# Patient Record
Sex: FEMALE | Race: WHITE | NOT HISPANIC OR LATINO | Employment: OTHER | URBAN - NONMETROPOLITAN AREA
[De-identification: names, ages, dates, MRNs, and addresses within clinical notes are randomized per-mention and may not be internally consistent; named-entity substitution may affect disease eponyms.]

---

## 2017-01-03 ENCOUNTER — TELEPHONE (OUTPATIENT)
Dept: SLEEP MEDICINE | Facility: HOSPITAL | Age: 67
End: 2017-01-03

## 2017-01-03 DIAGNOSIS — J45.20 MILD INTERMITTENT ASTHMA WITHOUT COMPLICATION: Primary | ICD-10-CM

## 2017-01-03 RX ORDER — PREDNISONE 20 MG/1
TABLET ORAL
Qty: 21 TABLET | Refills: 1 | Status: SHIPPED | OUTPATIENT
Start: 2017-01-03 | End: 2017-10-31

## 2017-01-12 ENCOUNTER — OFFICE VISIT (OUTPATIENT)
Dept: FAMILY MEDICINE | Facility: OTHER | Age: 67
End: 2017-01-12
Attending: PHYSICIAN ASSISTANT
Payer: COMMERCIAL

## 2017-01-12 VITALS
HEART RATE: 88 BPM | OXYGEN SATURATION: 94 % | HEIGHT: 63 IN | TEMPERATURE: 97.3 F | DIASTOLIC BLOOD PRESSURE: 78 MMHG | SYSTOLIC BLOOD PRESSURE: 123 MMHG

## 2017-01-12 DIAGNOSIS — J01.01 ACUTE RECURRENT MAXILLARY SINUSITIS: Primary | ICD-10-CM

## 2017-01-12 DIAGNOSIS — M79.7 FIBROMYALGIA: ICD-10-CM

## 2017-01-12 DIAGNOSIS — G25.81 RESTLESS LEGS SYNDROME: ICD-10-CM

## 2017-01-12 DIAGNOSIS — F33.1 MODERATE EPISODE OF RECURRENT MAJOR DEPRESSIVE DISORDER (H): ICD-10-CM

## 2017-01-12 PROCEDURE — 99212 OFFICE O/P EST SF 10 MIN: CPT

## 2017-01-12 PROCEDURE — 99214 OFFICE O/P EST MOD 30 MIN: CPT | Performed by: PHYSICIAN ASSISTANT

## 2017-01-12 RX ORDER — BUPROPION HYDROCHLORIDE 300 MG/1
300 TABLET ORAL EVERY MORNING
Qty: 30 TABLET | Refills: 3 | Status: SHIPPED | OUTPATIENT
Start: 2017-01-12 | End: 2017-04-26

## 2017-01-12 RX ORDER — PRAMIPEXOLE DIHYDROCHLORIDE 0.5 MG/1
TABLET ORAL
Qty: 30 TABLET | Refills: 12 | Status: SHIPPED | OUTPATIENT
Start: 2017-01-12 | End: 2018-01-21

## 2017-01-12 RX ORDER — CHLORAL HYDRATE 500 MG
1 CAPSULE ORAL 2 TIMES DAILY
COMMUNITY
End: 2017-12-07

## 2017-01-12 RX ORDER — TRAMADOL HYDROCHLORIDE 50 MG/1
TABLET ORAL
Qty: 60 TABLET | Refills: 0 | Status: SHIPPED | OUTPATIENT
Start: 2017-01-12 | End: 2018-05-23

## 2017-01-12 ASSESSMENT — ANXIETY QUESTIONNAIRES
5. BEING SO RESTLESS THAT IT IS HARD TO SIT STILL: SEVERAL DAYS
6. BECOMING EASILY ANNOYED OR IRRITABLE: SEVERAL DAYS
3. WORRYING TOO MUCH ABOUT DIFFERENT THINGS: MORE THAN HALF THE DAYS
2. NOT BEING ABLE TO STOP OR CONTROL WORRYING: MORE THAN HALF THE DAYS
7. FEELING AFRAID AS IF SOMETHING AWFUL MIGHT HAPPEN: SEVERAL DAYS
GAD7 TOTAL SCORE: 10
1. FEELING NERVOUS, ANXIOUS, OR ON EDGE: SEVERAL DAYS
IF YOU CHECKED OFF ANY PROBLEMS ON THIS QUESTIONNAIRE, HOW DIFFICULT HAVE THESE PROBLEMS MADE IT FOR YOU TO DO YOUR WORK, TAKE CARE OF THINGS AT HOME, OR GET ALONG WITH OTHER PEOPLE: SOMEWHAT DIFFICULT

## 2017-01-12 ASSESSMENT — PATIENT HEALTH QUESTIONNAIRE - PHQ9: 5. POOR APPETITE OR OVEREATING: MORE THAN HALF THE DAYS

## 2017-01-12 NOTE — NURSING NOTE
"Chief Complaint   Patient presents with     URI     *_* Health Care Directive *_*     pt will bring in copy        Initial /78 mmHg  Pulse 88  Temp(Src) 97.3  F (36.3  C) (Tympanic)  Ht 5' 3\" (1.6 m)  Wt   SpO2 94%  Breastfeeding? No Estimated body mass index is 28.35 kg/(m^2) as calculated from the following:    Height as of this encounter: 5' 3\" (1.6 m).    Weight as of 12/6/16: 160 lb (72.576 kg).  BP completed using cuff size: lakshmi [address] LPN      "

## 2017-01-12 NOTE — PROGRESS NOTES
SUBJECTIVE:                                                    Anya Vicente is a 66 year old female who presents to clinic today for the following health issues:      RESPIRATORY SYMPTOMS      Duration: had before [place]    Description  nasal congestion, rhinorrhea, facial pain/pressure, cough, wheezing, headache, fatigue/malaise and nausea    Severity: moderate    Accompanying signs and symptoms: coughing up green mucus     History (predisposing factors):  asthma    Precipitating or alleviating factors: None    Therapies tried and outcome:  nasal spray/wash - helped       Asthma Follow-Up    Was ACT completed today?    Yes    ACT Total Scores 8/27/2014   ACT TOTAL SCORE 13   ASTHMA ER VISITS 0 = None   ASTHMA HOSPITALIZATIONS 0 = None     Depression Followup    Status since last visit: Worsened in winter feels needs a higher dose.     See PHQ-9 for current symptoms.  Other associated symptoms: None    Complicating factors:   Significant life event:  No   Current substance abuse:  None  Anxiety or Panic symptoms:  No    PHQ-9  English PHQ-9   Any Language            Amount of exercise or physical activity: None    Problems taking medications regularly: No    Medication side effects: none  Diet: regular (no restrictions)      Medication Followup of tramadol for Fibromyalgia    Taking Medication as prescribed: yes    Side Effects:  None    Medication Helping Symptoms:  NO          Problem list and histories reviewed & adjusted, as indicated.  Additional history: as documented    Patient Active Problem List   Diagnosis     Narcolepsy without cataplexy     Advanced care planning/counseling discussion     Glucose intolerance (impaired glucose tolerance)     Insomnia     Major depressive disorder     Past Surgical History   Procedure Laterality Date     Hysterectomy  1995 01/01/2011     D&c  1991 01/01/2011     Hemorrhoidectomy  1979     01/01/2011     Echocardiogram  2006 01/01/2011      Bilateral cataract extraction  2006     01/01/2011     Leg repair after fx  >lt  2009     Bunion surgery > rt  2010     Trigger  thumb release  2011     Release carpal tunnel  2010     RT     Deviated septum repair  2012     Colonoscopy  12/23/2013     Procedure: COLONOSCOPY;  COLONOSCOPY;  Surgeon: Kasia Enrique DO;  Location: [facility]     Laparoscopic herniorrhaphy ventral  1/16/2014     Procedure: LAPAROSCOPIC HERNIORRHAPHY VENTRAL;  LAPAROSCOPIC VENTRAL HERNIA REPAIR W/ MESH;  Surgeon: Kasia Enrique DO;  Location: [facility] OR       Social History   Substance Use Topics     Smoking status: Never Smoker      Smokeless tobacco: Never Used     Alcohol Use: Yes      Comment: RARELY     Family History   Problem Relation Age of Onset     Alcohol/Drug Father 42     Motor Vehicle Accident due to Alcoholism - Cause of Death     DIABETES Maternal Uncle      Parkinsonism Maternal Uncle      Depression Mother      Hypertension Mother      CANCER Mother      Cervical     Parkinsonism Mother      Other - See Comments Sister      ADD/ADHD     Other - See Comments Sister      Fibromyalgia     DIABETES Sister      DIABETES Sister      Other - See Comments Sister      ADD/ADHD     Other - See Comments Son      ADD/ADHD     Other - See Comments Son      ADD/ADHD         Current Outpatient Prescriptions   Medication Sig Dispense Refill     fish oil-omega-3 fatty acids 1000 MG capsule Take 1 g by mouth 2 times daily       predniSONE (DELTASONE) 20 MG tablet 2 po qam x 7 d, 1 po q am x 7 d, then stop 21 tablet 1     amphetamine-dextroamphetamine (ADDERALL) 20 MG per tablet 2 tabs q AM, 1 tab q afternoon 90 tablet 0     buPROPion (WELLBUTRIN XL) 150 MG 24 hr tablet TAKE ONE TABLET BY MOUTH ONCE DAILY IN THE MORNING 90 tablet 1     albuterol (VENTOLIN HFA) 108 (90 BASE) MCG/ACT inhaler INHALE 2 PUFFS INTO THE LUNGS EVERY 4 HOURS AS NEEDED FOR SHORTNESS OFBREATH / DYSPNEA. 18 g 12     hydrochlorothiazide (HYDRODIURIL) 50 MG tablet TAKE  "ONE TABLET DAILY BY MOUTH 90 tablet 0     zolpidem (AMBIEN) 10 MG tablet TAKE 1/2 to 1 TABLET DAILY BY MOUT H AT BEDTIME - GENERIC FOR AMBIEN 30 tablet 1     pramipexole (MIRAPEX) 0.5 MG tablet TAKE 1 TABLET BY MOUTH AT B EDTIME - GENERIC FOR MIRAPE X 30 tablet 12     clobetasol (TEMOVATE) 0.05 % cream APPLY DAILY AS NEEDED -NO NOT USE LONGER THAN 2 WEEKS IN A ROW-GENERI C FOR TEMOVATE 15 g 0     traMADol (ULTRAM) 50 MG tablet TAKE 1 TO 2 TABLETS BY MOUTH EVERY 6 HOURS AS NEEDED FOR MODERATE P AIN 60 tablet 0     ORDER FOR DME Equipment being ordered: silver sneakers. 1 Act 0     ORDER FOR DME Equipment being ordered: thoracic/lumbrosacral support. 1 Device 1     diazepam (VALIUM) 5 MG tablet Take 1 tablet (5 mg) by mouth every 12 hours as needed for muscle spasms 30 tablet 0     ORDER FOR DME Equipment being ordered: knee sleeve 1 Device 6     Calcium Carbonate-Vit D-Min (CALCIUM 1200 PO) Take 1,200 mg by mouth daily        FREESTYLE LITE strip TEST TWICE A  each 0     fluticasone (FLONASE) 50 MCG/ACT nasal spray 2 SPRAYS IN EACH NOSTRIL DAILY - GENERIC FOR FLONASE 32 g 2     Allergies   Allergen Reactions     Acetaminophen      Darvocet-N 100       Codeine      Hydrocodone      \"Patient states can take liquid med but not pill\".     Propoxyphene Napsylate      Darvocet-N 100     BP Readings from Last 3 Encounters:   01/12/17 123/78   12/06/16 124/74   02/04/16 114/68    Wt Readings from Last 3 Encounters:   12/06/16 160 lb (72.576 kg)   02/04/16 156 lb (70.761 kg)   04/29/15 152 lb (68.947 kg)                  Problem list, Medication list, Allergies, and Medical/Social/Surgical histories reviewed in EPIC and updated as appropriate.    ROS:  Constitutional, neuro, ENT, endocrine, pulmonary, cardiac, gastrointestinal, genitourinary, musculoskeletal, integument and psychiatric systems are negative, except as otherwise noted.    OBJECTIVE:                                                    /78 mmHg  " "Pulse 88  Temp(Src) 97.3  F (36.3  C) (Tympanic)  Ht 5' 3\" (1.6 m)  Wt   SpO2 94%  Breastfeeding? No  There is no weight on file to calculate BMI.  GENERAL APPEARANCE: healthy, alert and no distress  EYES: Eyes grossly normal to inspection, PERRL and conjunctivae and sclerae normal  HENT: ear canals and TM's normal and nose and mouth without ulcers or lesions  NECK: no adenopathy, no asymmetry, masses, or scars and thyroid normal to palpation  RESP: lungs clear to auscultation - no rales, rhonchi or wheezes. choking like coughing.   CV: regular rates and rhythm, normal S1 S2, no S3 or S4 and no murmur, click or rub  LYMPHATICS: normal ant/post cervical and supraclavicular nodes  ABDOMEN: soft, nontender, without hepatosplenomegaly or masses and bowel sounds normal  MS: extremities normal- no gross deformities noted  SKIN: no suspicious lesions or rashes  NEURO: Normal strength and tone, mentation intact and speech normal  PSYCH: mentation appears normal and affect intense, .       Diagnostic test results:  Diagnostic Test Results:  No results found for this or any previous visit (from the past 24 hour(s)).     ASSESSMENT/PLAN:                                                    1. Acute recurrent maxillary sinusitis  Recurrent issues with this.  Post drip and on prednisone. Allergies have been bad and saw Dr. Gomez.   - amoxicillin-clavulanate (AUGMENTIN) 875-125 MG per tablet; Take 1 tablet by mouth 2 times daily  Dispense: 20 tablet; Refill: 0    2. Restless legs syndrome  She is given a refill.   - pramipexole (MIRAPEX) 0.5 MG tablet; TAKE 1 TABLET BY MOUTH AT B EDTIME - GENERIC FOR MIRAPE X  Dispense: 30 tablet; Refill: 12    3. Fibromyalgia  Long standing.  No energy and depression. Using tramadol sparingly.     - traMADol (ULTRAM) 50 MG tablet; TAKE 1 TO 2 TABLETS BY MOUTH EVERY 6 HOURS AS NEEDED FOR MODERATE P AIN  Dispense: 60 tablet; Refill: 0    4. Moderate episode of recurrent major depressive " disorder (H)  Increase her dose.   Let us know how things are when she sees us back for her physical.     - buPROPion (WELLBUTRIN XL) 300 MG 24 hr tablet; Take 1 tablet (300 mg) by mouth every morning  Dispense: 30 tablet; Refill: 3        See Patient Instructions    Cindy Danielle PA-C  [facility]

## 2017-01-12 NOTE — MR AVS SNAPSHOT
After Visit Summary   1/12/2017    Anya Vicente    MRN: [id number]           Patient Information     Date Of Birth          1950        Visit Information        Provider Department      1/12/2017 11:15 AM Cindy Danielle, PA [facility]        Today's Diagnoses     Acute recurrent maxillary sinusitis    -  1     Restless legs syndrome         Fibromyalgia         Moderate episode of recurrent major depressive disorder (H)           Care Instructions      My Asthma Action Plan  Name: Anya Vicente   YOB: 1950  Date: 1/12/2017   My doctor: Cindy Danielle   My clinic: [facility] [place]        My Rescue Medicine: Albuterol (Proair/Ventolin/Proventil) HFA          My Oral Steroid Medicine: prednisone My Asthma Severity: intermittent   Avoid your asthma triggers: Patient is unaware of triggers. Has many allergies and with her viral infections.           GREEN ZONE   Good Control    I feel good    No cough or wheeze    Can work, sleep and play without asthma symptoms       Take your asthma control medicine every day.     1. If exercise triggers your asthma, take your rescue medication    15 minutes before exercise or sports, and    During exercise if you have asthma symptoms  2. Spacer to use with inhaler: If you have a spacer, make sure to use it with your inhaler             YELLOW ZONE Getting Worse  I have ANY of these:    I do not feel good    Cough or wheeze    Chest feels tight    Wake up at night   1. Keep taking your Green Zone medications  2. Start taking your rescue medicine:    every 20 minutes for up to 1 hour. Then every 4 hours for 24-48 hours.  3. If you stay in the Yellow Zone for more than 12-24 hours, contact your doctor.  4. If you do not return to the Green Zone in 12-24 hours or you get worse, start taking your oral steroid medicine if prescribed by your provider.           RED ZONE Medical Alert - Get Help  I have ANY of  these:    I feel awful    Medicine is not helping    Breathing getting harder    Trouble walking or talking    Nose opens wide to breathe       1. Take your rescue medicine NOW  2. If your provider has prescribed an oral steroid medicine, start taking it NOW  3. Call your doctor NOW  4. If you are still in the Red Zone after 20 minutes and you have not reached your doctor:    Take your rescue medicine again and    Call 911 or go to the emergency room right away    See your regular doctor within 2 weeks of an Emergency Room or Urgent Care visit for follow-up treatment.        The above medication may be given at school or day care?: N/A (Adult Patient)  Child can carry and use inhaler(s) at school with approval of school nurse?: N/A (Adult Patient)    Electronically signed by: Shantelle Way, January 12, 2017    Annual Reminders:  Meet with Asthma Educator,  Flu Shot in the Fall, consider Pneumonia Vaccination for patients with asthma (aged 19 and older).    Pharmacy:    [id number] - [place], [place] - [address] [facility] - [place], [place] - [address] [facility] [address]                    Asthma Triggers  How To Control Things That Make Your Asthma Worse    Triggers are things that make your asthma worse.  Look at the list below to help you find your triggers and what you can do about them.  You can help prevent asthma flare-ups by staying away from your triggers.      Trigger                                                          What you can do   Cigarette Smoke  Tobacco smoke can make asthma worse. Do not allow smoking in your home, car or around you.  Be sure no one smokes at a child s day care or school.  If you smoke, ask your health care provider for ways to help you quit.  Ask family members to quit too.  Ask your health care provider for a referral to Quit Plan to help you quit smoking, or call 8-608-551-PLAN.     Colds, Flu, Bronchitis  These are common triggers of asthma. Wash your hands  often.  Don t touch your eyes, nose or mouth.  Get a flu shot every year.     Dust Mites  These are tiny bugs that live in cloth or carpet. They are too small to see. Wash sheets and blankets in hot water every week.   Encase pillows and mattress in dust mite proof covers.  Avoid having carpet if you can. If you have carpet, vacuum weekly.   Use a dust mask and HEPA vacuum.   Pollen and Outdoor Mold  Some people are allergic to trees, grass, or weed pollen, or molds. Try to keep your windows closed.  Limit time out doors when pollen count is high.   Ask you health care provider about taking medicine during allergy season.     Animal Dander  Some people are allergic to skin flakes, urine or saliva from pets with fur or feathers. Keep pets with fur or feathers out of your home.    If you can t keep the pet outdoors, then keep the pet out of your bedroom.  Keep the bedroom door closed.  Keep pets off cloth furniture and away from stuffed toys.     Mice, Rats, and Cockroaches  Some people are allergic to the waste from these pests.   Cover food and garbage.  Clean up spills and food crumbs.  Store grease in the refrigerator.   Keep food out of the bedroom.   Indoor Mold  This can be a trigger if your home has high moisture. Fix leaking faucets, pipes, or other sources of water.   Clean moldy surfaces.  Dehumidify basement if it is damp and smelly.   Smoke, Strong Odors, and Sprays  These can reduce air quality. Stay away from strong odors and sprays, such as perfume, powder, hair spray, paints, smoke incense, paint, cleaning products, candles and new carpet.   Exercise or Sports  Some people with asthma have this trigger. Be active!  Ask your doctor about taking medicine before sports or exercise to prevent symptoms.    Warm up for 5-10 minutes before and after sports or exercise.     Other Triggers of Asthma  Cold air:  Cover your nose and mouth with a scarf.  Sometimes laughing or crying can be a trigger.  Some  "medicines and food can trigger asthma.           Follow-ups after your visit        Who to contact     If you have questions or need follow up information about today's clinic visit or your schedule please contact [facility] [place] directly at [phone].  Normal or non-critical lab and imaging results will be communicated to you by MyChart, letter or phone within 4 business days after the clinic has received the results. If you do not hear from us within 7 days, please contact the clinic through [employer] or phone. If you have a critical or abnormal lab result, we will notify you by phone as soon as possible.  Submit refill requests through [employer] or call your pharmacy and they will forward the refill request to us. Please allow 3 business days for your refill to be completed.          Additional Information About Your Visit        [employer] Information     [employer] gives you secure access to your electronic health record. If you see a primary care provider, you can also send messages to your care team and make appointments. If you have questions, please call your primary care clinic.  If you do not have a primary care provider, please call [phone] and they will assist you.        Care EveryWhere ID     This is your Care EveryWhere ID. This could be used by other organizations to access your [place] medical records  [id number]        Your Vitals Were     Pulse Temperature Height Pulse Oximetry Breastfeeding?       88 97.3  F (36.3  C) (Tympanic) 5' 3\" (1.6 m) 94% No        Blood Pressure from Last 3 Encounters:   01/12/17 123/78   12/06/16 124/74   02/04/16 114/68    Weight from Last 3 Encounters:   12/06/16 160 lb (72.576 kg)   02/04/16 156 lb (70.761 kg)   04/29/15 152 lb (68.947 kg)              We Performed the Following     Asthma Action Plan (AAP)          Today's Medication Changes          These changes are accurate as of: 1/12/17 11:46 AM.  If you have any questions, ask your nurse or doctor. "               Start taking these medicines.        Dose/Directions    amoxicillin-clavulanate 875-125 MG per tablet   Commonly known as:  AUGMENTIN   Used for:  Acute recurrent maxillary sinusitis   Started by:  Cindy Danielle PA        Dose:  1 tablet   Take 1 tablet by mouth 2 times daily   Quantity:  20 tablet   Refills:  0         These medicines have changed or have updated prescriptions.        Dose/Directions    buPROPion 300 MG 24 hr tablet   Commonly known as:  WELLBUTRIN XL   This may have changed:  See the new instructions.   Used for:  Moderate episode of recurrent major depressive disorder (H)   Changed by:  Cindy Danielle PA        Dose:  300 mg   Take 1 tablet (300 mg) by mouth every morning   Quantity:  30 tablet   Refills:  3       traMADol 50 MG tablet   Commonly known as:  ULTRAM   This may have changed:  See the new instructions.   Used for:  Fibromyalgia   Changed by:  Cindy Danielle PA        TAKE 1 TO 2 TABLETS BY MOUTH EVERY 6 HOURS AS NEEDED FOR MODERATE P AIN   Quantity:  60 tablet   Refills:  0            Where to get your medicines      These medications were sent to [id number] - [place] - [address] [place] [address]     Phone:  [phone]    - amoxicillin-clavulanate 875-125 MG per tablet  - buPROPion 300 MG 24 hr tablet  - pramipexole 0.5 MG tablet      Some of these will need a paper prescription and others can be bought over the counter.  Ask your nurse if you have questions.     Bring a paper prescription for each of these medications    - traMADol 50 MG tablet             Primary Care Provider Office Phone # Fax #    RAHDAMES Avendaño [phone]       [address] [address]  [place] [address]        Thank you!     Thank you for choosing [facility]  for your care. Our goal is always to provide you with excellent care. Hearing back from our patients is one way we can  continue to improve our services. Please take a few minutes to complete the written survey that you may receive in the mail after your visit with us. Thank you!             Your Updated Medication List - Protect others around you: Learn how to safely use, store and throw away your medicines at www.disposemymeds.org.          This list is accurate as of: 1/12/17 11:46 AM.  Always use your most recent med list.                   Brand Name Dispense Instructions for use    albuterol 108 (90 BASE) MCG/ACT Inhaler    VENTOLIN HFA    18 g    INHALE 2 PUFFS INTO THE LUNGS EVERY 4 HOURS AS NEEDED FOR SHORTNESS OFBREATH / DYSPNEA.       amoxicillin-clavulanate 875-125 MG per tablet    AUGMENTIN    20 tablet    Take 1 tablet by mouth 2 times daily       amphetamine-dextroamphetamine 20 MG per tablet    ADDERALL    90 tablet    2 tabs q AM, 1 tab q afternoon       buPROPion 300 MG 24 hr tablet    WELLBUTRIN XL    30 tablet    Take 1 tablet (300 mg) by mouth every morning       CALCIUM 1200 PO      Take 1,200 mg by mouth daily       clobetasol 0.05 % cream    TEMOVATE    15 g    APPLY DAILY AS NEEDED -NO NOT USE LONGER THAN 2 WEEKS IN A ROW-GENERI C FOR TEMOVATE       diazepam 5 MG tablet    VALIUM    30 tablet    Take 1 tablet (5 mg) by mouth every 12 hours as needed for muscle spasms       fish oil-omega-3 fatty acids 1000 MG capsule      Take 1 g by mouth 2 times daily       fluticasone 50 MCG/ACT spray    FLONASE    32 g    2 SPRAYS IN EACH NOSTRIL DAILY - GENERIC FOR FLONASE       FREESTYLE LITE test strip   Generic drug:  blood glucose monitoring     100 each    TEST TWICE A DAY       hydrochlorothiazide 50 MG tablet    HYDRODIURIL    90 tablet    TAKE ONE TABLET DAILY BY MOUTH       order for DME     1 Device    Equipment being ordered: knee sleeve       * order for DME     1 Act    Equipment being ordered: silver sneakers.       * order for DME     1 Device    Equipment being ordered: thoracic/lumbrosacral support.        pramipexole 0.5 MG tablet    MIRAPEX    30 tablet    TAKE 1 TABLET BY MOUTH AT B EDTIME - GENERIC FOR MIRAPE X       predniSONE 20 MG tablet    DELTASONE    21 tablet    2 po qam x 7 d, 1 po q am x 7 d, then stop       traMADol 50 MG tablet    ULTRAM    60 tablet    TAKE 1 TO 2 TABLETS BY MOUTH EVERY 6 HOURS AS NEEDED FOR MODERATE P AIN       zolpidem 10 MG tablet    AMBIEN    30 tablet    TAKE 1/2 to 1 TABLET DAILY BY MOUT H AT BEDTIME - GENERIC FOR AMBIEN       * Notice:  This list has 2 medication(s) that are the same as other medications prescribed for you. Read the directions carefully, and ask your doctor or other care provider to review them with you.

## 2017-01-12 NOTE — PATIENT INSTRUCTIONS
My Asthma Action Plan  Name: Anya Vicente   YOB: 1950  Date: 1/12/2017   My doctor: Cindy Danielle   My clinic: [facility] [place]        My Rescue Medicine: Albuterol (Proair/Ventolin/Proventil) HFA          My Oral Steroid Medicine: prednisone My Asthma Severity: intermittent   Avoid your asthma triggers: Patient is unaware of triggers. Has many allergies and with her viral infections.           GREEN ZONE   Good Control    I feel good    No cough or wheeze    Can work, sleep and play without asthma symptoms       Take your asthma control medicine every day.     1. If exercise triggers your asthma, take your rescue medication    15 minutes before exercise or sports, and    During exercise if you have asthma symptoms  2. Spacer to use with inhaler: If you have a spacer, make sure to use it with your inhaler             YELLOW ZONE Getting Worse  I have ANY of these:    I do not feel good    Cough or wheeze    Chest feels tight    Wake up at night   1. Keep taking your Green Zone medications  2. Start taking your rescue medicine:    every 20 minutes for up to 1 hour. Then every 4 hours for 24-48 hours.  3. If you stay in the Yellow Zone for more than 12-24 hours, contact your doctor.  4. If you do not return to the Green Zone in 12-24 hours or you get worse, start taking your oral steroid medicine if prescribed by your provider.           RED ZONE Medical Alert - Get Help  I have ANY of these:    I feel awful    Medicine is not helping    Breathing getting harder    Trouble walking or talking    Nose opens wide to breathe       1. Take your rescue medicine NOW  2. If your provider has prescribed an oral steroid medicine, start taking it NOW  3. Call your doctor NOW  4. If you are still in the Red Zone after 20 minutes and you have not reached your doctor:    Take your rescue medicine again and    Call 911 or go to the emergency room right away    See your regular doctor within 2  weeks of an Emergency Room or Urgent Care visit for follow-up treatment.        The above medication may be given at school or day care?: N/A (Adult Patient)  Child can carry and use inhaler(s) at school with approval of school nurse?: N/A (Adult Patient)    Electronically signed by: Shantelle Way, January 12, 2017    Annual Reminders:  Meet with Asthma Educator,  Flu Shot in the Fall, consider Pneumonia Vaccination for patients with asthma (aged 19 and older).    Pharmacy:    [id number] - [place], [place] - [address] [facility] - [place], [place] - [address]                     Asthma Triggers  How To Control Things That Make Your Asthma Worse    Triggers are things that make your asthma worse.  Look at the list below to help you find your triggers and what you can do about them.  You can help prevent asthma flare-ups by staying away from your triggers.      Trigger                                                          What you can do   Cigarette Smoke  Tobacco smoke can make asthma worse. Do not allow smoking in your home, car or around you.  Be sure no one smokes at a child s day care or school.  If you smoke, ask your health care provider for ways to help you quit.  Ask family members to quit too.  Ask your health care provider for a referral to Quit Plan to help you quit smoking, or call 4-834-462-PLAN.     Colds, Flu, Bronchitis  These are common triggers of asthma. Wash your hands often.  Don t touch your eyes, nose or mouth.  Get a flu shot every year.     Dust Mites  These are tiny bugs that live in cloth or carpet. They are too small to see. Wash sheets and blankets in hot water every week.   Encase pillows and mattress in dust mite proof covers.  Avoid having carpet if you can. If you have carpet, vacuum weekly.   Use a dust mask and HEPA vacuum.   Pollen and Outdoor Mold  Some people are allergic to trees, grass, or weed pollen, or molds. Try to keep your windows  closed.  Limit time out doors when pollen count is high.   Ask you health care provider about taking medicine during allergy season.     Animal Dander  Some people are allergic to skin flakes, urine or saliva from pets with fur or feathers. Keep pets with fur or feathers out of your home.    If you can t keep the pet outdoors, then keep the pet out of your bedroom.  Keep the bedroom door closed.  Keep pets off cloth furniture and away from stuffed toys.     Mice, Rats, and Cockroaches  Some people are allergic to the waste from these pests.   Cover food and garbage.  Clean up spills and food crumbs.  Store grease in the refrigerator.   Keep food out of the bedroom.   Indoor Mold  This can be a trigger if your home has high moisture. Fix leaking faucets, pipes, or other sources of water.   Clean moldy surfaces.  Dehumidify basement if it is damp and smelly.   Smoke, Strong Odors, and Sprays  These can reduce air quality. Stay away from strong odors and sprays, such as perfume, powder, hair spray, paints, smoke incense, paint, cleaning products, candles and new carpet.   Exercise or Sports  Some people with asthma have this trigger. Be active!  Ask your doctor about taking medicine before sports or exercise to prevent symptoms.    Warm up for 5-10 minutes before and after sports or exercise.     Other Triggers of Asthma  Cold air:  Cover your nose and mouth with a scarf.  Sometimes laughing or crying can be a trigger.  Some medicines and food can trigger asthma.

## 2017-01-13 DIAGNOSIS — Z12.31 VISIT FOR SCREENING MAMMOGRAM: Primary | ICD-10-CM

## 2017-01-13 ASSESSMENT — PATIENT HEALTH QUESTIONNAIRE - PHQ9: SUM OF ALL RESPONSES TO PHQ QUESTIONS 1-9: 16

## 2017-01-13 ASSESSMENT — ANXIETY QUESTIONNAIRES: GAD7 TOTAL SCORE: 10

## 2017-01-13 ASSESSMENT — ASTHMA QUESTIONNAIRES: ACT_TOTALSCORE: 10

## 2017-01-16 PROCEDURE — G0202 SCR MAMMO BI INCL CAD: HCPCS | Mod: TC

## 2017-01-16 PROCEDURE — 77063 BREAST TOMOSYNTHESIS BI: CPT | Mod: TC

## 2017-02-17 DIAGNOSIS — F51.01 PRIMARY INSOMNIA: ICD-10-CM

## 2017-02-17 RX ORDER — ZOLPIDEM TARTRATE 10 MG/1
TABLET ORAL
Qty: 30 TABLET | Refills: 1 | Status: SHIPPED | OUTPATIENT
Start: 2017-02-17 | End: 2017-08-09

## 2017-02-17 NOTE — TELEPHONE ENCOUNTER
ambien      Last Written Prescription Date: 5/9/16  Last Fill Quantity: 30,  # refills: 1   Last Office Visit with G, P or [facility] prescribing provider: 1/12/17

## 2017-03-30 DIAGNOSIS — G47.419 NARCOLEPSY WITHOUT CATAPLEXY(347.00): ICD-10-CM

## 2017-03-30 RX ORDER — DEXTROAMPHETAMINE SACCHARATE, AMPHETAMINE ASPARTATE, DEXTROAMPHETAMINE SULFATE AND AMPHETAMINE SULFATE 5; 5; 5; 5 MG/1; MG/1; MG/1; MG/1
20 TABLET ORAL 3 TIMES DAILY
Qty: 90 TABLET | Refills: 0 | Status: SHIPPED | OUTPATIENT
Start: 2017-04-28 | End: 2017-06-06

## 2017-03-30 RX ORDER — DEXTROAMPHETAMINE SACCHARATE, AMPHETAMINE ASPARTATE, DEXTROAMPHETAMINE SULFATE AND AMPHETAMINE SULFATE 5; 5; 5; 5 MG/1; MG/1; MG/1; MG/1
TABLET ORAL
Qty: 90 TABLET | Refills: 0 | Status: SHIPPED | OUTPATIENT
Start: 2017-03-30 | End: 2017-06-06

## 2017-03-30 RX ORDER — DEXTROAMPHETAMINE SACCHARATE, AMPHETAMINE ASPARTATE, DEXTROAMPHETAMINE SULFATE AND AMPHETAMINE SULFATE 5; 5; 5; 5 MG/1; MG/1; MG/1; MG/1
20 TABLET ORAL 3 TIMES DAILY
Qty: 60 TABLET | Refills: 0 | Status: SHIPPED | OUTPATIENT
Start: 2017-05-30 | End: 2017-06-06

## 2017-04-19 ENCOUNTER — OFFICE VISIT (OUTPATIENT)
Dept: FAMILY MEDICINE | Facility: OTHER | Age: 67
End: 2017-04-19
Attending: NURSE PRACTITIONER
Payer: COMMERCIAL

## 2017-04-19 VITALS
OXYGEN SATURATION: 97 % | TEMPERATURE: 95.3 F | SYSTOLIC BLOOD PRESSURE: 122 MMHG | BODY MASS INDEX: 28.1 KG/M2 | RESPIRATION RATE: 22 BRPM | HEART RATE: 82 BPM | WEIGHT: 158.6 LBS | DIASTOLIC BLOOD PRESSURE: 64 MMHG | HEIGHT: 63 IN

## 2017-04-19 DIAGNOSIS — N89.8 VAGINAL ITCHING: ICD-10-CM

## 2017-04-19 DIAGNOSIS — K62.89 RECTAL IRRITATION: Primary | ICD-10-CM

## 2017-04-19 LAB
MICRO REPORT STATUS: NORMAL
SPECIMEN SOURCE: NORMAL
WET PREP SPEC: NORMAL

## 2017-04-19 PROCEDURE — 87210 SMEAR WET MOUNT SALINE/INK: CPT | Performed by: NURSE PRACTITIONER

## 2017-04-19 PROCEDURE — 99212 OFFICE O/P EST SF 10 MIN: CPT

## 2017-04-19 PROCEDURE — 99213 OFFICE O/P EST LOW 20 MIN: CPT | Performed by: NURSE PRACTITIONER

## 2017-04-19 ASSESSMENT — PAIN SCALES - GENERAL: PAINLEVEL: NO PAIN (0)

## 2017-04-19 NOTE — NURSING NOTE
"Chief Complaint   Patient presents with     Vaginal Problem       Initial /64 (BP Location: Right arm, Patient Position: Chair, Cuff Size: Adult Large)  Pulse 82  Temp 95.3  F (35.2  C) (Tympanic)  Resp 22  Ht 5' 3\" (1.6 m)  Wt 158 lb 9.6 oz (71.9 kg)  SpO2 97%  BMI 28.09 kg/m2 Estimated body mass index is 28.09 kg/(m^2) as calculated from the following:    Height as of this encounter: 5' 3\" (1.6 m).    Weight as of this encounter: 158 lb 9.6 oz (71.9 kg).  Medication Reconciliation: complete   Avelina Renteria      "

## 2017-04-19 NOTE — PATIENT INSTRUCTIONS
"  Ova and Parasites (Stool)  Does this test have other names?  Stool sample examination, stool O&P, fecal smear  What is this test?  This test looks for parasites and their larvae or eggs in a sample of your stool.  Parasites are organisms that can live within or on the human body and use it as a source of food. Many live in the digestive tract.  Many parasites also cause illnesses. These include one-celled organisms, such as Giardia, and larger organisms such as pinworms. In their adult form, pinworms can usually large enough to be seen.  Why do I need this test?  You may need this test if your healthcare provider suspects that you have parasites in your digestive tract. Giardia and cryptosporidium are common parasitic illnesses. Symptoms include:    Stomach upset or bloating    Diarrhea    Gas    Stomach cramping    Dehydration    Weight loss  Pinworms can live in the colon and rectum. Anal itching is a symptom of a pinworm infection. The itching is usually worse at night and may disturb your sleep.  What other tests might I have along with this test?  Your healthcare provider may also order blood tests and other tests for specific parasites. For example, if your doctor suspects a pinworm infection, you may have to do a \"tape test.\" In this test, the adhesive side of a piece of cellophane tape is gently pressed to the skin around the anus. Pinworm eggs will stick to the tape. Then the eggs can be moved to a slide and looked at under a microscope.  What do my test results mean?  Many things may affect your lab test results. These include the method each lab uses to do the test. Even if your test results are different from the normal value, you may not have a problem. To learn what the results mean for you, talk with your healthcare provider.  Normal results are negative, meaning that no parasites, larvae, or eggs were found in your sample.  Positive results mean that you have an infection with a parasite.  How is " this test done?  This test requires a stool sample. Your healthcare provider will tell you how to collect the sample. Don't collect fecal material from the toilet bowl or put toilet paper into the specimen container.  What might affect my test results?  Certain medicines can affect your results. This can be true for up to 1 week after you take the medicines. These medicines include:    Antacids    Bismuth    Some medicines used to treat diarrhea    Barium    Antibiotics    Oily laxatives  A sample contaminated by urine or toilet water may have an inaccurate result. Timing is also important. If the sample isn't brought to the lab promptly, the results may not be accurate.  How do I get ready for this test?  You may need to stop taking certain medicines before the test. Be sure your healthcare provider knows about all medicines, herbs, vitamins, and supplements you are taking. This includes medicines that don't need a prescription and any illicit drugs you may use.     9961-9025 The [employer]. [address] [place] [address], [place] [address]. All rights reserved. This information is not intended as a substitute for professional medical care. Always follow your healthcare professional's instructions.

## 2017-04-19 NOTE — MR AVS SNAPSHOT
"              After Visit Summary   4/19/2017    Anya Vicente    MRN: [id number]           Patient Information     Date Of Birth          1950        Visit Information        Provider Department      4/19/2017 12:40 PM Ying Tracy APRN [facility] [place]        Today's Diagnoses     Rectal irritation    -  1    Vaginal itching          Care Instructions      Ova and Parasites (Stool)  Does this test have other names?  Stool sample examination, stool O&P, fecal smear  What is this test?  This test looks for parasites and their larvae or eggs in a sample of your stool.  Parasites are organisms that can live within or on the human body and use it as a source of food. Many live in the digestive tract.  Many parasites also cause illnesses. These include one-celled organisms, such as Giardia, and larger organisms such as pinworms. In their adult form, pinworms can usually large enough to be seen.  Why do I need this test?  You may need this test if your healthcare provider suspects that you have parasites in your digestive tract. Giardia and cryptosporidium are common parasitic illnesses. Symptoms include:    Stomach upset or bloating    Diarrhea    Gas    Stomach cramping    Dehydration    Weight loss  Pinworms can live in the colon and rectum. Anal itching is a symptom of a pinworm infection. The itching is usually worse at night and may disturb your sleep.  What other tests might I have along with this test?  Your healthcare provider may also order blood tests and other tests for specific parasites. For example, if your doctor suspects a pinworm infection, you may have to do a \"tape test.\" In this test, the adhesive side of a piece of cellophane tape is gently pressed to the skin around the anus. Pinworm eggs will stick to the tape. Then the eggs can be moved to a slide and looked at under a microscope.  What do my test results mean?  Many things may affect your lab test results. " These include the method each lab uses to do the test. Even if your test results are different from the normal value, you may not have a problem. To learn what the results mean for you, talk with your healthcare provider.  Normal results are negative, meaning that no parasites, larvae, or eggs were found in your sample.  Positive results mean that you have an infection with a parasite.  How is this test done?  This test requires a stool sample. Your healthcare provider will tell you how to collect the sample. Don't collect fecal material from the toilet bowl or put toilet paper into the specimen container.  What might affect my test results?  Certain medicines can affect your results. This can be true for up to 1 week after you take the medicines. These medicines include:    Antacids    Bismuth    Some medicines used to treat diarrhea    Barium    Antibiotics    Oily laxatives  A sample contaminated by urine or toilet water may have an inaccurate result. Timing is also important. If the sample isn't brought to the lab promptly, the results may not be accurate.  How do I get ready for this test?  You may need to stop taking certain medicines before the test. Be sure your healthcare provider knows about all medicines, herbs, vitamins, and supplements you are taking. This includes medicines that don't need a prescription and any illicit drugs you may use.     3799-1124 The [employer]. [address] [place] [address]. All rights reserved. This information is not intended as a substitute for professional medical care. Always follow your healthcare professional's instructions.              Follow-ups after your visit        Follow-up notes from your care team     Return if symptoms worsen or fail to improve.      Future tests that were ordered for you today     Open Future Orders        Priority Expected Expires Ordered    Pinworm exam Routine  5/19/2017 4/19/2017    Ova and Parasite Exam Routine  "Routine  4/19/2018 4/19/2017            Who to contact     If you have questions or need follow up information about today's clinic visit or your schedule please contact [facility] [place] directly at [phone].  Normal or non-critical lab and imaging results will be communicated to you by MyChart, letter or phone within 4 business days after the clinic has received the results. If you do not hear from us within 7 days, please contact the clinic through MyChart or phone. If you have a critical or abnormal lab result, we will notify you by phone as soon as possible.  Submit refill requests through [employer] or call your pharmacy and they will forward the refill request to us. Please allow 3 business days for your refill to be completed.          Additional Information About Your Visit        [employer] Information     [employer] gives you secure access to your electronic health record. If you see a primary care provider, you can also send messages to your care team and make appointments. If you have questions, please call your primary care clinic.  If you do not have a primary care provider, please call [phone] and they will assist you.        Care EveryWhere ID     This is your Care EveryWhere ID. This could be used by other organizations to access your [facility] medical records  [id number]        Your Vitals Were     Pulse Temperature Respirations Height Pulse Oximetry BMI (Body Mass Index)    82 95.3  F (35.2  C) (Tympanic) 22 5' 3\" (1.6 m) 97% 28.09 kg/m2       Blood Pressure from Last 3 Encounters:   04/19/17 122/64   01/12/17 123/78   12/06/16 124/74    Weight from Last 3 Encounters:   04/19/17 158 lb 9.6 oz (71.9 kg)   12/06/16 160 lb (72.6 kg)   02/04/16 156 lb (70.8 kg)              We Performed the Following     Wet prep        Primary Care Provider Office Phone # Fax #    RADHAMES Avendaño [phone]       [facility] [address]  [place] [address]      "   Thank you!     [facility] you [facility]  [facility] your [facility]. Our goal is always to provide you with excellent care. Hearing back from our patients is one way we can continue to improve our services. Please take a few minutes to complete the written survey that you may receive in the mail after your visit with us. Thank you!             Your Updated Medication List - Protect others around you: Learn how to safely use, store and throw away your medicines at www.disposemymeds.org.          This list is accurate as of: 4/19/17  1:00 PM.  Always use your most recent med list.                   Brand Name Dispense Instructions for use    albuterol 108 (90 BASE) MCG/ACT Inhaler    VENTOLIN HFA    18 g    INHALE 2 PUFFS INTO THE LUNGS EVERY 4 HOURS AS NEEDED FOR SHORTNESS OFBREATH / DYSPNEA.       amoxicillin-clavulanate 875-125 MG per tablet    AUGMENTIN    20 tablet    Take 1 tablet by mouth 2 times daily       * amphetamine-dextroamphetamine 20 MG per tablet    ADDERALL    90 tablet    2 tabs q AM, 1 tab q afternoon       * amphetamine-dextroamphetamine 20 MG per tablet   Start taking on:  4/28/2017    ADDERALL    90 tablet    Take 1 tablet (20 mg) by mouth 3 times daily       * amphetamine-dextroamphetamine 20 MG per tablet   Start taking on:  5/30/2017    ADDERALL    60 tablet    Take 1 tablet (20 mg) by mouth 3 times daily       buPROPion 300 MG 24 hr tablet    WELLBUTRIN XL    30 tablet    Take 1 tablet (300 mg) by mouth every morning       CALCIUM 1200 PO      Take 1,200 mg by mouth daily       clobetasol 0.05 % cream    TEMOVATE    15 g    APPLY DAILY AS NEEDED -NO NOT USE LONGER THAN 2 WEEKS IN A ROW-GENERI C FOR TEMOVATE       diazepam 5 MG tablet    VALIUM    30 tablet    Take 1 tablet (5 mg) by mouth every 12 hours as needed for muscle spasms       fish oil-omega-3 fatty acids 1000 MG capsule      Take 1 g by mouth 2 times daily       fluticasone 50 MCG/ACT spray    FLONASE    32 g    2  SPRAYS IN EACH NOSTRIL DAILY - GENERIC FOR FLONASE       FREESTYLE LITE test strip   Generic drug:  blood glucose monitoring     100 each    TEST TWICE A DAY       hydrochlorothiazide 50 MG tablet    HYDRODIURIL    90 tablet    TAKE ONE TABLET DAILY BY MOUTH       order for DME     1 Device    Equipment being ordered: knee sleeve       * order for DME     1 Act    Equipment being ordered: silver sneakers.       * order for DME     1 Device    Equipment being ordered: thoracic/lumbrosacral support.       pramipexole 0.5 MG tablet    MIRAPEX    30 tablet    TAKE 1 TABLET BY MOUTH AT B EDTIME - GENERIC FOR MIRAPE X       predniSONE 20 MG tablet    DELTASONE    21 tablet    2 po qam x 7 d, 1 po q am x 7 d, then stop       traMADol 50 MG tablet    ULTRAM    60 tablet    TAKE 1 TO 2 TABLETS BY MOUTH EVERY 6 HOURS AS NEEDED FOR MODERATE P AIN       zolpidem 10 MG tablet    AMBIEN    30 tablet    TAKE 1/2 to 1 TABLET DAILY BY MOUT H AT BEDTIME - GENERIC FOR AMBIEN       * Notice:  This list has 5 medication(s) that are the same as other medications prescribed for you. Read the directions carefully, and ask your doctor or other care provider to review them with you.

## 2017-04-19 NOTE — PROGRESS NOTES
SUBJECTIVE:                                                    Anya Vicente is a 66 year old female who presents to clinic today for the following health issues:        Vaginal Symptoms      Duration: itching, thinks she has pin worms    Description  itching and burning    Intensity:  moderate    Accompanying signs and symptoms (fever/dysuria/abdominal or back pain): None    History  Sexually active: yes, single partner, contraception not required  Possibility of pregnancy: No  Recent antibiotic use: no     Precipitating or alleviating factors: None    Therapies tried and outcome: none and pinex   Outcome: didn't work     Anya states she has noted pin worms in her stool       Problem list and histories reviewed & adjusted, as indicated.  Additional history: as documented    Patient Active Problem List   Diagnosis     Narcolepsy without cataplexy     Advanced care planning/counseling discussion     Glucose intolerance (impaired glucose tolerance)     Insomnia     Major depressive disorder     Past Surgical History:   Procedure Laterality Date     BILATERAL CATARACT EXTRACTION  2006 01/01/2011     Bunion Surgery > RT  2010     COLONOSCOPY  12/23/2013    Procedure: COLONOSCOPY;  COLONOSCOPY;  Surgeon: Kasia Enrique DO;  Location: [facility]     D&C  1991 01/01/2011     deviated septum repair  2012     ECHOCARDIOGRAM  2006 01/01/2011     HEMORRHOIDECTOMY  1979    01/01/2011     HYSTERECTOMY  1995    01/01/2011     LAPAROSCOPIC HERNIORRHAPHY VENTRAL  1/16/2014    Procedure: LAPAROSCOPIC HERNIORRHAPHY VENTRAL;  LAPAROSCOPIC VENTRAL HERNIA REPAIR W/ MESH;  Surgeon: Kasia Enrique DO;  Location: [facility] OR     leg repair after fx  >LT  2009     RELEASE CARPAL TUNNEL  2010    RT     TRIGGER  THUMB RELEASE  2011       Social History   Substance Use Topics     Smoking status: Never Smoker     Smokeless tobacco: Never Used     Alcohol use Yes      Comment: RARELY     Family History   Problem Relation Age  of Onset     Alcohol/Drug Father 42     Motor Vehicle Accident due to Alcoholism - Cause of Death     DIABETES Maternal Uncle      Parkinsonism Maternal Uncle      Depression Mother      Hypertension Mother      CANCER Mother      Cervical     Parkinsonism Mother      Other - See Comments Sister      ADD/ADHD     Other - See Comments Sister      Fibromyalgia     DIABETES Sister      DIABETES Sister      Other - See Comments Sister      ADD/ADHD     Other - See Comments Son      ADD/ADHD     Other - See Comments Son      ADD/ADHD         Current Outpatient Prescriptions   Medication Sig Dispense Refill     amphetamine-dextroamphetamine (ADDERALL) 20 MG per tablet 2 tabs q AM, 1 tab q afternoon 90 tablet 0     [START ON 4/28/2017] amphetamine-dextroamphetamine (ADDERALL) 20 MG per tablet Take 1 tablet (20 mg) by mouth 3 times daily 90 tablet 0     [START ON 5/30/2017] amphetamine-dextroamphetamine (ADDERALL) 20 MG per tablet Take 1 tablet (20 mg) by mouth 3 times daily 60 tablet 0     zolpidem (AMBIEN) 10 MG tablet TAKE 1/2 to 1 TABLET DAILY BY MOUT H AT BEDTIME - GENERIC FOR AMBIEN 30 tablet 1     fish oil-omega-3 fatty acids 1000 MG capsule Take 1 g by mouth 2 times daily       pramipexole (MIRAPEX) 0.5 MG tablet TAKE 1 TABLET BY MOUTH AT B EDTIME - GENERIC FOR MIRAPE X 30 tablet 12     traMADol (ULTRAM) 50 MG tablet TAKE 1 TO 2 TABLETS BY MOUTH EVERY 6 HOURS AS NEEDED FOR MODERATE P AIN 60 tablet 0     buPROPion (WELLBUTRIN XL) 300 MG 24 hr tablet Take 1 tablet (300 mg) by mouth every morning 30 tablet 3     amoxicillin-clavulanate (AUGMENTIN) 875-125 MG per tablet Take 1 tablet by mouth 2 times daily 20 tablet 0     predniSONE (DELTASONE) 20 MG tablet 2 po qam x 7 d, 1 po q am x 7 d, then stop 21 tablet 1     albuterol (VENTOLIN HFA) 108 (90 BASE) MCG/ACT inhaler INHALE 2 PUFFS INTO THE LUNGS EVERY 4 HOURS AS NEEDED FOR SHORTNESS OFBREATH / DYSPNEA. 18 g 12     hydrochlorothiazide (HYDRODIURIL) 50 MG tablet TAKE  "ONE TABLET DAILY BY MOUTH 90 tablet 0     clobetasol (TEMOVATE) 0.05 % cream APPLY DAILY AS NEEDED -NO NOT USE LONGER THAN 2 WEEKS IN A ROW-GENERI C FOR TEMOVATE 15 g 0     ORDER FOR DME Equipment being ordered: silver sneakers. 1 Act 0     ORDER FOR DME Equipment being ordered: thoracic/lumbrosacral support. 1 Device 1     diazepam (VALIUM) 5 MG tablet Take 1 tablet (5 mg) by mouth every 12 hours as needed for muscle spasms 30 tablet 0     ORDER FOR DME Equipment being ordered: knee sleeve 1 Device 6     Calcium Carbonate-Vit D-Min (CALCIUM 1200 PO) Take 1,200 mg by mouth daily        FREESTYLE LITE strip TEST TWICE A  each 0     fluticasone (FLONASE) 50 MCG/ACT nasal spray 2 SPRAYS IN EACH NOSTRIL DAILY - GENERIC FOR FLONASE 32 g 2     Allergies   Allergen Reactions     Acetaminophen      Darvocet-N 100       Codeine      Hydrocodone      \"Patient states can take liquid med but not pill\".     Propoxyphene Napsylate      Darvocet-N 100       Reviewed and updated as needed this visit by clinical staff  Tobacco  Allergies  Meds  Med Hx  Surg Hx  Fam Hx  Soc Hx      Reviewed and updated as needed this visit by Provider         ROS:  C: NEGATIVE for fever, chills, change in weight  E/M: NEGATIVE for ear, mouth and throat problems  R: NEGATIVE for significant cough or SOB  CV: NEGATIVE for chest pain, palpitations or peripheral edema  GI: noted pin worms in stool  : vaginal irritation    OBJECTIVE:                                                    /64 (BP Location: Right arm, Patient Position: Chair, Cuff Size: Adult Large)  Pulse 82  Temp 95.3  F (35.2  C) (Tympanic)  Resp 22  Ht 5' 3\" (1.6 m)  Wt 158 lb 9.6 oz (71.9 kg)  SpO2 97%  BMI 28.09 kg/m2  Body mass index is 28.09 kg/(m^2).   GENERAL: healthy, alert and no distress  RESP: lungs clear to auscultation - no rales, rhonchi or wheezes  CV: regular rate and rhythm, normal S1 S2, no S3 or S4, no murmur, click or rub, no peripheral edema " and peripheral pulses strong  ABDOMEN: soft, nontender, no hepatosplenomegaly, no masses and bowel sounds normal   (female): normal female external genitalia, normal urethral meatus , vaginal discharge - scant and white and normal cervix, adnexae, and uterus without masses.  RECTAL (female): negative for parasites     Diagnostic Test Results:  Results for orders placed or performed in visit on 04/19/17 (from the past 24 hour(s))   Wet prep   Result Value Ref Range    Specimen Description Vagina     Wet Prep       Few WBC'S seen  No Trichomonas seen  No clue cells seen  No yeast seen      Micro Report Status FINAL 04/19/2017         Pinworm and Ova and parasite test pending    ASSESSMENT:                                                        PLAN:                                                    ASSESSMENT / PLAN:  (K62.89) Rectal irritation  (primary encounter diagnosis)  Comment:   Plan:  Pinworm exam,    Ova and Parasite Exam Routine            (L29.8) Vaginal itching  Comment:   Plan:  Wet prep            Follow up if no improvement or worsening symptoms        Ying Tracy, CONSUEL[facility] [place]

## 2017-04-24 DIAGNOSIS — K62.89 RECTAL IRRITATION: ICD-10-CM

## 2017-04-24 LAB
E VERMICULARIS SPEC QL PINWORM EXAM: NORMAL
G LAMBLIA+CRYPTOSP AG STL QL IA: NORMAL
MICRO REPORT STATUS: NORMAL
O+P STL MICRO: NORMAL
SPECIMEN SOURCE: NORMAL

## 2017-04-24 PROCEDURE — 87329 GIARDIA AG IA: CPT | Performed by: NURSE PRACTITIONER

## 2017-04-24 PROCEDURE — 87172 PINWORM EXAM: CPT | Performed by: NURSE PRACTITIONER

## 2017-04-24 PROCEDURE — 87328 CRYPTOSPORIDIUM AG IA: CPT | Performed by: NURSE PRACTITIONER

## 2017-04-25 DIAGNOSIS — B80 PINWORMS: Primary | ICD-10-CM

## 2017-04-25 RX ORDER — ALBENDAZOLE 200 MG/1
400 TABLET, FILM COATED ORAL ONCE
Qty: 4 TABLET | Refills: 0 | Status: SHIPPED | OUTPATIENT
Start: 2017-04-25 | End: 2017-04-25

## 2017-04-25 NOTE — PROGRESS NOTES
States she continues to see worms in her stool and is very irritating will send prescription for pin worms

## 2017-04-26 DIAGNOSIS — F33.1 MODERATE EPISODE OF RECURRENT MAJOR DEPRESSIVE DISORDER (H): ICD-10-CM

## 2017-04-27 RX ORDER — BUPROPION HYDROCHLORIDE 300 MG/1
TABLET ORAL
Qty: 30 TABLET | Refills: 3 | Status: SHIPPED | OUTPATIENT
Start: 2017-04-27 | End: 2017-08-24

## 2017-04-27 NOTE — TELEPHONE ENCOUNTER
wellbutrin       Last Written Prescription Date: 1/12/17  Last Fill Quantity: 30; # refills: 3  Last Office Visit with G, [facility] or [facility] prescribing provider:  4/19/17        Last PHQ-9 score on record=   PHQ-9 SCORE 1/12/2017   Total Score -   Total Score 16       Lab Results   Component Value Date    AST 15 02/04/2016     Lab Results   Component Value Date    ALT 23 02/04/2016

## 2017-06-06 DIAGNOSIS — G47.419 NARCOLEPSY WITHOUT CATAPLEXY(347.00): ICD-10-CM

## 2017-06-06 RX ORDER — DEXTROAMPHETAMINE SACCHARATE, AMPHETAMINE ASPARTATE, DEXTROAMPHETAMINE SULFATE AND AMPHETAMINE SULFATE 5; 5; 5; 5 MG/1; MG/1; MG/1; MG/1
TABLET ORAL
Qty: 90 TABLET | Refills: 0 | Status: SHIPPED | OUTPATIENT
Start: 2017-06-06 | End: 2017-08-09

## 2017-06-06 RX ORDER — DEXTROAMPHETAMINE SACCHARATE, AMPHETAMINE ASPARTATE, DEXTROAMPHETAMINE SULFATE AND AMPHETAMINE SULFATE 5; 5; 5; 5 MG/1; MG/1; MG/1; MG/1
20 TABLET ORAL 3 TIMES DAILY
Qty: 60 TABLET | Refills: 0 | Status: SHIPPED | OUTPATIENT
Start: 2017-08-04 | End: 2017-08-09

## 2017-06-06 RX ORDER — DEXTROAMPHETAMINE SACCHARATE, AMPHETAMINE ASPARTATE, DEXTROAMPHETAMINE SULFATE AND AMPHETAMINE SULFATE 5; 5; 5; 5 MG/1; MG/1; MG/1; MG/1
20 TABLET ORAL 3 TIMES DAILY
Qty: 90 TABLET | Refills: 0 | Status: SHIPPED | OUTPATIENT
Start: 2017-07-06 | End: 2017-08-09

## 2017-06-21 ENCOUNTER — MYC MEDICAL ADVICE (OUTPATIENT)
Dept: FAMILY MEDICINE | Facility: OTHER | Age: 67
End: 2017-06-21

## 2017-06-22 ASSESSMENT — PATIENT HEALTH QUESTIONNAIRE - PHQ9
SUM OF ALL RESPONSES TO PHQ QUESTIONS 1-9: 2
SUM OF ALL RESPONSES TO PHQ QUESTIONS 1-9: 2
10. IF YOU CHECKED OFF ANY PROBLEMS, HOW DIFFICULT HAVE THESE PROBLEMS MADE IT FOR YOU TO DO YOUR WORK, TAKE CARE OF THINGS AT HOME, OR GET ALONG WITH OTHER PEOPLE: NOT DIFFICULT AT ALL

## 2017-06-23 ASSESSMENT — PATIENT HEALTH QUESTIONNAIRE - PHQ9: SUM OF ALL RESPONSES TO PHQ QUESTIONS 1-9: 2

## 2017-07-26 DIAGNOSIS — R69 DIAGNOSIS UNKNOWN: ICD-10-CM

## 2017-07-28 RX ORDER — HYDROCHLOROTHIAZIDE 50 MG/1
TABLET ORAL
Qty: 90 TABLET | Refills: 1 | Status: SHIPPED | OUTPATIENT
Start: 2017-07-28 | End: 2018-01-21

## 2017-07-28 NOTE — TELEPHONE ENCOUNTER
Pt called and stated she is going out of town camping for a few days and hoping this can be done today. If you have any questions or concerns please call her back at [phone]. Pt was advised of 72 hour processing

## 2017-07-28 NOTE — TELEPHONE ENCOUNTER
HCTZ      Last Written Prescription Date: 6/3/16  Last Fill Quantity: 90, # refills: 0  Last Office Visit with [facility], [facility] or [facility] prescribing provider: 4/9/17       Potassium   Date Value Ref Range Status   02/04/2016 4.0 3.4 - 5.3 mmol/L Final     Creatinine   Date Value Ref Range Status   02/04/2016 0.78 0.52 - 1.04 mg/dL Final     BP Readings from Last 3 Encounters:   04/19/17 122/64   01/12/17 123/78   12/06/16 124/74

## 2017-08-09 DIAGNOSIS — G47.419 NARCOLEPSY WITHOUT CATAPLEXY(347.00): ICD-10-CM

## 2017-08-09 DIAGNOSIS — F51.01 PRIMARY INSOMNIA: ICD-10-CM

## 2017-08-09 RX ORDER — DEXTROAMPHETAMINE SACCHARATE, AMPHETAMINE ASPARTATE, DEXTROAMPHETAMINE SULFATE AND AMPHETAMINE SULFATE 5; 5; 5; 5 MG/1; MG/1; MG/1; MG/1
TABLET ORAL
Qty: 90 TABLET | Refills: 0 | Status: SHIPPED | OUTPATIENT
Start: 2017-08-09 | End: 2017-10-05

## 2017-08-09 RX ORDER — DEXTROAMPHETAMINE SACCHARATE, AMPHETAMINE ASPARTATE, DEXTROAMPHETAMINE SULFATE AND AMPHETAMINE SULFATE 5; 5; 5; 5 MG/1; MG/1; MG/1; MG/1
20 TABLET ORAL 3 TIMES DAILY
Qty: 90 TABLET | Refills: 0 | Status: SHIPPED | OUTPATIENT
Start: 2017-09-08 | End: 2017-10-05

## 2017-08-09 RX ORDER — ZOLPIDEM TARTRATE 10 MG/1
TABLET ORAL
Qty: 30 TABLET | Refills: 1 | Status: SHIPPED | OUTPATIENT
Start: 2017-08-09 | End: 2017-12-07

## 2017-08-09 RX ORDER — DEXTROAMPHETAMINE SACCHARATE, AMPHETAMINE ASPARTATE, DEXTROAMPHETAMINE SULFATE AND AMPHETAMINE SULFATE 5; 5; 5; 5 MG/1; MG/1; MG/1; MG/1
20 TABLET ORAL 3 TIMES DAILY
Qty: 60 TABLET | Refills: 0 | Status: SHIPPED | OUTPATIENT
Start: 2017-10-09 | End: 2017-10-05

## 2017-08-09 NOTE — TELEPHONE ENCOUNTER
ambien      Last Written Prescription Date: 2/17/17  Last Fill Quantity: 30,  # refills: 1   Last Office Visit with G, P or [facility] prescribing provider: 4/19/17

## 2017-08-24 DIAGNOSIS — F33.1 MODERATE EPISODE OF RECURRENT MAJOR DEPRESSIVE DISORDER (H): ICD-10-CM

## 2017-08-24 RX ORDER — BUPROPION HYDROCHLORIDE 300 MG/1
TABLET ORAL
Qty: 30 TABLET | Refills: 2 | Status: SHIPPED | OUTPATIENT
Start: 2017-08-24 | End: 2017-11-02

## 2017-10-05 DIAGNOSIS — G47.419 NARCOLEPSY WITHOUT CATAPLEXY(347.00): ICD-10-CM

## 2017-10-05 RX ORDER — DEXTROAMPHETAMINE SACCHARATE, AMPHETAMINE ASPARTATE, DEXTROAMPHETAMINE SULFATE AND AMPHETAMINE SULFATE 5; 5; 5; 5 MG/1; MG/1; MG/1; MG/1
20 TABLET ORAL 3 TIMES DAILY
Qty: 90 TABLET | Refills: 0 | Status: SHIPPED | OUTPATIENT
Start: 2017-11-06 | End: 2017-12-07

## 2017-10-05 RX ORDER — DEXTROAMPHETAMINE SACCHARATE, AMPHETAMINE ASPARTATE, DEXTROAMPHETAMINE SULFATE AND AMPHETAMINE SULFATE 5; 5; 5; 5 MG/1; MG/1; MG/1; MG/1
20 TABLET ORAL 3 TIMES DAILY
Qty: 60 TABLET | Refills: 0 | Status: SHIPPED | OUTPATIENT
Start: 2017-12-08 | End: 2017-12-07

## 2017-10-05 RX ORDER — DEXTROAMPHETAMINE SACCHARATE, AMPHETAMINE ASPARTATE, DEXTROAMPHETAMINE SULFATE AND AMPHETAMINE SULFATE 5; 5; 5; 5 MG/1; MG/1; MG/1; MG/1
TABLET ORAL
Qty: 90 TABLET | Refills: 0 | Status: SHIPPED | OUTPATIENT
Start: 2017-10-05 | End: 2018-01-16

## 2017-10-28 DIAGNOSIS — J30.9 ALLERGIC RHINITIS: ICD-10-CM

## 2017-10-31 DIAGNOSIS — J45.20 MILD INTERMITTENT ASTHMA WITHOUT COMPLICATION: ICD-10-CM

## 2017-10-31 NOTE — TELEPHONE ENCOUNTER
VENTOLIN  (90 BASE) MCG/ACT Inhaler     Last Written Prescription Date: 07/22/2016  Last Fill Quantity: 18 grams,  # refills: 12   Last Office Visit with [facility], [facility] or [facility] prescribing provider: 04/19/2017

## 2017-11-01 RX ORDER — ALBUTEROL SULFATE 90 UG/1
AEROSOL, METERED RESPIRATORY (INHALATION)
Qty: 18 G | Refills: 0 | Status: SHIPPED | OUTPATIENT
Start: 2017-11-01 | End: 2017-11-07

## 2017-11-01 RX ORDER — PREDNISONE 20 MG/1
TABLET ORAL
Qty: 21 TABLET | Refills: 1 | Status: SHIPPED | OUTPATIENT
Start: 2017-11-01 | End: 2020-01-29

## 2017-11-02 DIAGNOSIS — F33.1 MODERATE EPISODE OF RECURRENT MAJOR DEPRESSIVE DISORDER (H): ICD-10-CM

## 2017-11-02 NOTE — TELEPHONE ENCOUNTER
wellbutrin      Last Written Prescription Date: 8/24/17  Last Fill Quantity: 30,  # refills: 2   Last Office Visit with G, P or [facility] prescribing provider: 4/19/17

## 2017-11-06 RX ORDER — BUPROPION HYDROCHLORIDE 300 MG/1
TABLET ORAL
Qty: 30 TABLET | Refills: 0 | Status: SHIPPED | OUTPATIENT
Start: 2017-11-06 | End: 2017-11-29

## 2017-11-20 ENCOUNTER — TELEPHONE (OUTPATIENT)
Dept: FAMILY MEDICINE | Facility: OTHER | Age: 67
End: 2017-11-20

## 2017-11-20 NOTE — TELEPHONE ENCOUNTER
1:03 PM    Reason for Call: OVERBOOK    Patient is having the following symptoms: possible pulled groin muscle for 2 days.    The patient is requesting an appointment for some time this week with Cindy Danielle.    Was an appointment offered for this call? Yes, patient would like to be seen sooner than next available on 11-29-17  If yes : Appointment type              Date    Preferred method for responding to this message: Telephone Call  What is your phone number ? [phone]    If we cannot reach you directly, may we leave a detailed response at the number you provided? Yes    Can this message wait until your PCP/provider returns, if unavailable today? YES    Yaneth Anderson

## 2017-11-20 NOTE — TELEPHONE ENCOUNTER
Unable to see earlier due to holiday and provider vacation. Can see another provider or go to urgent care if unable to wait.  Yessenia Lord LPN

## 2017-11-29 DIAGNOSIS — F33.1 MODERATE EPISODE OF RECURRENT MAJOR DEPRESSIVE DISORDER (H): ICD-10-CM

## 2017-12-01 RX ORDER — BUPROPION HYDROCHLORIDE 300 MG/1
TABLET ORAL
Qty: 30 TABLET | Refills: 0 | Status: SHIPPED | OUTPATIENT
Start: 2017-12-01 | End: 2017-12-29

## 2017-12-01 NOTE — TELEPHONE ENCOUNTER
Wellbutrin      Last Written Prescription Date: 11/6/17  Last Fill Quantity: 30,  # refills: 0   Last Office Visit with [facility], [facility] or [facility] prescribing provider: 4/19/17                                         Next 5 appointments (look out 90 days)     Dec 07, 2017 10:45 AM CST   (Arrive by 10:30 AM)   Office Visit with RADHAMES Avendaño   [facility] ([facility] )    [address] [place] [address]   [phone]

## 2017-12-07 ENCOUNTER — TELEPHONE (OUTPATIENT)
Dept: FAMILY MEDICINE | Facility: OTHER | Age: 67
End: 2017-12-07

## 2017-12-07 ENCOUNTER — RADIANT APPOINTMENT (OUTPATIENT)
Dept: GENERAL RADIOLOGY | Facility: OTHER | Age: 67
End: 2017-12-07
Attending: PHYSICIAN ASSISTANT
Payer: MEDICARE

## 2017-12-07 ENCOUNTER — OFFICE VISIT (OUTPATIENT)
Dept: FAMILY MEDICINE | Facility: OTHER | Age: 67
End: 2017-12-07
Attending: PHYSICIAN ASSISTANT
Payer: MEDICARE

## 2017-12-07 VITALS
TEMPERATURE: 96.6 F | BODY MASS INDEX: 28.34 KG/M2 | HEART RATE: 82 BPM | SYSTOLIC BLOOD PRESSURE: 126 MMHG | WEIGHT: 160 LBS | OXYGEN SATURATION: 98 % | DIASTOLIC BLOOD PRESSURE: 70 MMHG

## 2017-12-07 DIAGNOSIS — J98.01 ACUTE BRONCHOSPASM: Primary | ICD-10-CM

## 2017-12-07 DIAGNOSIS — R10.31 RIGHT INGUINAL PAIN: ICD-10-CM

## 2017-12-07 DIAGNOSIS — M47.14 OSTEOARTHRITIS OF THORACIC SPINE WITH MYELOPATHY: ICD-10-CM

## 2017-12-07 DIAGNOSIS — R05.9 COUGH: ICD-10-CM

## 2017-12-07 DIAGNOSIS — R10.31 RIGHT INGUINAL PAIN: Primary | ICD-10-CM

## 2017-12-07 LAB
ALBUMIN SERPL-MCNC: 3.4 G/DL (ref 3.4–5)
ALP SERPL-CCNC: 83 U/L (ref 40–150)
ALT SERPL W P-5'-P-CCNC: 23 U/L (ref 0–50)
ANION GAP SERPL CALCULATED.3IONS-SCNC: 4 MMOL/L (ref 3–14)
AST SERPL W P-5'-P-CCNC: 17 U/L (ref 0–45)
BASOPHILS # BLD AUTO: 0.1 10E9/L (ref 0–0.2)
BASOPHILS NFR BLD AUTO: 0.9 %
BILIRUB SERPL-MCNC: 0.3 MG/DL (ref 0.2–1.3)
BUN SERPL-MCNC: 14 MG/DL (ref 7–30)
CALCIUM SERPL-MCNC: 8.6 MG/DL (ref 8.5–10.1)
CHLORIDE SERPL-SCNC: 107 MMOL/L (ref 94–109)
CO2 SERPL-SCNC: 29 MMOL/L (ref 20–32)
CREAT SERPL-MCNC: 0.81 MG/DL (ref 0.52–1.04)
DIFFERENTIAL METHOD BLD: NORMAL
EOSINOPHIL # BLD AUTO: 0.1 10E9/L (ref 0–0.7)
EOSINOPHIL NFR BLD AUTO: 2.3 %
ERYTHROCYTE [DISTWIDTH] IN BLOOD BY AUTOMATED COUNT: 12.4 % (ref 10–15)
GFR SERPL CREATININE-BSD FRML MDRD: 70 ML/MIN/1.7M2
GLUCOSE SERPL-MCNC: 101 MG/DL (ref 70–99)
HCT VFR BLD AUTO: 42.5 % (ref 35–47)
HGB BLD-MCNC: 14.4 G/DL (ref 11.7–15.7)
IMM GRANULOCYTES # BLD: 0 10E9/L (ref 0–0.4)
IMM GRANULOCYTES NFR BLD: 0.2 %
LYMPHOCYTES # BLD AUTO: 1.9 10E9/L (ref 0.8–5.3)
LYMPHOCYTES NFR BLD AUTO: 35 %
MCH RBC QN AUTO: 31.4 PG (ref 26.5–33)
MCHC RBC AUTO-ENTMCNC: 33.9 G/DL (ref 31.5–36.5)
MCV RBC AUTO: 93 FL (ref 78–100)
MONOCYTES # BLD AUTO: 0.4 10E9/L (ref 0–1.3)
MONOCYTES NFR BLD AUTO: 6.8 %
NEUTROPHILS # BLD AUTO: 3 10E9/L (ref 1.6–8.3)
NEUTROPHILS NFR BLD AUTO: 54.8 %
NRBC # BLD AUTO: 0 10*3/UL
NRBC BLD AUTO-RTO: 0 /100
PLATELET # BLD AUTO: 355 10E9/L (ref 150–450)
POTASSIUM SERPL-SCNC: 4.1 MMOL/L (ref 3.4–5.3)
PROT SERPL-MCNC: 7.1 G/DL (ref 6.8–8.8)
RBC # BLD AUTO: 4.59 10E12/L (ref 3.8–5.2)
SODIUM SERPL-SCNC: 140 MMOL/L (ref 133–144)
WBC # BLD AUTO: 5.6 10E9/L (ref 4–11)

## 2017-12-07 PROCEDURE — 85025 COMPLETE CBC W/AUTO DIFF WBC: CPT | Mod: ZL | Performed by: PHYSICIAN ASSISTANT

## 2017-12-07 PROCEDURE — 99212 OFFICE O/P EST SF 10 MIN: CPT | Mod: 25

## 2017-12-07 PROCEDURE — 80053 COMPREHEN METABOLIC PANEL: CPT | Mod: ZL | Performed by: PHYSICIAN ASSISTANT

## 2017-12-07 PROCEDURE — 93005 ELECTROCARDIOGRAM TRACING: CPT

## 2017-12-07 PROCEDURE — 36415 COLL VENOUS BLD VENIPUNCTURE: CPT | Mod: ZL | Performed by: PHYSICIAN ASSISTANT

## 2017-12-07 PROCEDURE — 99214 OFFICE O/P EST MOD 30 MIN: CPT | Mod: 25 | Performed by: PHYSICIAN ASSISTANT

## 2017-12-07 PROCEDURE — 71020 XR CHEST 2 VW: CPT | Mod: TC

## 2017-12-07 PROCEDURE — 93010 ELECTROCARDIOGRAM REPORT: CPT | Performed by: INTERNAL MEDICINE

## 2017-12-07 PROCEDURE — 73502 X-RAY EXAM HIP UNI 2-3 VIEWS: CPT | Mod: TC

## 2017-12-07 ASSESSMENT — ANXIETY QUESTIONNAIRES
7. FEELING AFRAID AS IF SOMETHING AWFUL MIGHT HAPPEN: NOT AT ALL
6. BECOMING EASILY ANNOYED OR IRRITABLE: SEVERAL DAYS
3. WORRYING TOO MUCH ABOUT DIFFERENT THINGS: SEVERAL DAYS
GAD7 TOTAL SCORE: 2
1. FEELING NERVOUS, ANXIOUS, OR ON EDGE: NOT AT ALL
4. TROUBLE RELAXING: NOT AT ALL
5. BEING SO RESTLESS THAT IT IS HARD TO SIT STILL: NOT AT ALL
2. NOT BEING ABLE TO STOP OR CONTROL WORRYING: NOT AT ALL
IF YOU CHECKED OFF ANY PROBLEMS ON THIS QUESTIONNAIRE, HOW DIFFICULT HAVE THESE PROBLEMS MADE IT FOR YOU TO DO YOUR WORK, TAKE CARE OF THINGS AT HOME, OR GET ALONG WITH OTHER PEOPLE: NOT DIFFICULT AT ALL

## 2017-12-07 ASSESSMENT — ASTHMA QUESTIONNAIRES
QUESTION_1 LAST FOUR WEEKS HOW MUCH OF THE TIME DID YOUR ASTHMA KEEP YOU FROM GETTING AS MUCH DONE AT WORK, SCHOOL OR AT HOME: A LITTLE OF THE TIME
QUESTION_4 LAST FOUR WEEKS HOW OFTEN HAVE YOU USED YOUR RESCUE INHALER OR NEBULIZER MEDICATION (SUCH AS ALBUTEROL): TWO OR THREE TIMES PER WEEK
QUESTION_5 LAST FOUR WEEKS HOW WOULD YOU RATE YOUR ASTHMA CONTROL: WELL CONTROLLED
ACT_TOTALSCORE: 17
QUESTION_3 LAST FOUR WEEKS HOW OFTEN DID YOUR ASTHMA SYMPTOMS (WHEEZING, COUGHING, SHORTNESS OF BREATH, CHEST TIGHTNESS OR PAIN) WAKE YOU UP AT NIGHT OR EARLIER THAN USUAL IN THE MORNING: TWO OR THREE NIGHTS A WEEK
QUESTION_2 LAST FOUR WEEKS HOW OFTEN HAVE YOU HAD SHORTNESS OF BREATH: ONCE OR TWICE A WEEK

## 2017-12-07 ASSESSMENT — PATIENT HEALTH QUESTIONNAIRE - PHQ9: SUM OF ALL RESPONSES TO PHQ QUESTIONS 1-9: 4

## 2017-12-07 ASSESSMENT — PAIN SCALES - GENERAL: PAINLEVEL: MILD PAIN (2)

## 2017-12-07 NOTE — LETTER
My Asthma Action Plan  Name: Anya Vicente   YOB: 1950  Date: 12/7/2017   My doctor: RADHAMES Bowman   My clinic: [facility] [place]        My Control Medicine: None  My Rescue Medicine: Albuterol (Proair/Ventolin/Proventil) inhaler 200470 base)mcg/act   My Asthma Severity: moderate persistent  Avoid your asthma triggers: see below   dust mites  strong odors and fumes            GREEN ZONE   Good Control    I feel good    No cough or wheeze    Can work, sleep and play without asthma symptoms       Take your asthma control medicine every day.     1. If exercise triggers your asthma, take your rescue medication    15 minutes before exercise or sports, and    During exercise if you have asthma symptoms  2. Spacer to use with inhaler: If you have a spacer, make sure to use it with your inhaler             YELLOW ZONE Getting Worse  I have ANY of these:    I do not feel good    Cough or wheeze    Chest feels tight    Wake up at night   1. Keep taking your Green Zone medications  2. Start taking your rescue medicine:    every 20 minutes for up to 1 hour. Then every 4 hours for 24-48 hours.  3. If you stay in the Yellow Zone for more than 12-24 hours, contact your doctor.  4. If you do not return to the Green Zone in 12-24 hours or you get worse, start taking your oral steroid medicine if prescribed by your provider.           RED ZONE Medical Alert - Get Help  I have ANY of these:    I feel awful    Medicine is not helping    Breathing getting harder    Trouble walking or talking    Nose opens wide to breathe       1. Take your rescue medicine NOW  2. If your provider has prescribed an oral steroid medicine, start taking it NOW  3. Call your doctor NOW  4. If you are still in the Red Zone after 20 minutes and you have not reached your doctor:    Take your rescue medicine again and    Call 911 or go to the emergency room right away    See your regular doctor within 2 weeks of an  Emergency Room or Urgent Care visit for follow-up treatment.        Electronically signed by: Linda Dove, December 7, 2017    Annual Reminders:  Meet with Asthma Educator,  Flu Shot in the Fall, consider Pneumonia Vaccination for patients with asthma (aged 19 and older).    Pharmacy:    [id number] - [place], [place] - [address] [facility] [facility] - [place], [place] [phone]                     Asthma Triggers  How To Control Things That Make Your Asthma Worse    Triggers are things that make your asthma worse.  Look at the list below to help you find your triggers and what you can do about them.  You can help prevent asthma flare-ups by staying away from your triggers.      Trigger                                                          What you can do   Cigarette Smoke  Tobacco smoke can make asthma worse. Do not allow smoking in your home, car or around you.  Be sure no one smokes at a child s day care or school.  If you smoke, ask your health care provider for ways to help you quit.  Ask family members to quit too.  Ask your health care provider for a referral to Quit Plan to help you quit smoking, or call 3-979-343-PLAN.     Colds, Flu, Bronchitis  These are common triggers of asthma. Wash your hands often.  Don t touch your eyes, nose or mouth.  Get a flu shot every year.     Dust Mites  These are tiny bugs that live in cloth or carpet. They are too small to see. Wash sheets and blankets in hot water every week.   Encase pillows and mattress in dust mite proof covers.  Avoid having carpet if you can. If you have carpet, vacuum weekly.   Use a dust mask and HEPA vacuum.   Pollen and Outdoor Mold  Some people are allergic to trees, grass, or weed pollen, or molds. Try to keep your windows closed.  Limit time out doors when pollen count is high.   Ask you health care provider about taking medicine during allergy season.     Animal Dander  Some people are allergic to skin flakes, urine  or saliva from pets with fur or feathers. Keep pets with fur or feathers out of your home.    If you can t keep the pet outdoors, then keep the pet out of your bedroom.  Keep the bedroom door closed.  Keep pets off cloth furniture and away from stuffed toys.     Mice, Rats, and Cockroaches  Some people are allergic to the waste from these pests.   Cover food and garbage.  Clean up spills and food crumbs.  Store grease in the refrigerator.   Keep food out of the bedroom.   Indoor Mold  This can be a trigger if your home has high moisture. Fix leaking faucets, pipes, or other sources of water.   Clean moldy surfaces.  Dehumidify basement if it is damp and smelly.   Smoke, Strong Odors, and Sprays  These can reduce air quality. Stay away from strong odors and sprays, such as perfume, powder, hair spray, paints, smoke incense, paint, cleaning products, candles and new carpet.   Exercise or Sports  Some people with asthma have this trigger. Be active!  Ask your doctor about taking medicine before sports or exercise to prevent symptoms.    Warm up for 5-10 minutes before and after sports or exercise.     Other Triggers of Asthma  Cold air:  Cover your nose and mouth with a scarf.  Sometimes laughing or crying can be a trigger.  Some medicines and food can trigger asthma.

## 2017-12-07 NOTE — PATIENT INSTRUCTIONS
Thank you for choosing [facility].   I have office hours 8:00 am to 4:30 pm on Monday's, Wednesday's, Thursday's and Friday's. My nurse and I are out of the office every Tuesday.    Following your visit, when your labs and diagnostic testing have returned, I will review then and you will be contacted by my nurse.  If you are on My Chart, you can also view results there.    For refills, notify your pharmacy regarding what you need and the pharmacy will generate a refill request. Do not call my nurse as she is unable to process refill request. Please plan ahead and allow 3-5 days for refill requests.    You will generally receive a reminder call the day prior to your appointment.  If you cannot attend your appointment, please cancel your appointment with as much notice as possible.  If there is a pattern of failure to present for your appointments, I cannot provide consistent, meaningful, ongoing care for you. It is very important to me that you come in for your care, so we can best assist you with your health care needs.    IMPORTANT:  Please note that it is my standard of practice to NOT participate in prescribing ongoing requested Narcotic Analgesic therapy, and/or participate in the prescribing of other controlled substances.  My nurse and I am happy to assist you with the process of referral for alternative pain management as needed, and other treatment modalities including but not limited to:  Physical Therapy, Physical Medicine and Rehab, Counseling, Chiropractic Care, Orthopedic Care, and non-narcotic medication management.     In the event that you need to be seen for emergent concerns and I am out of office,  please see one of my colleagues for acute concerns.  You may also present to  or ER.  I appreciate the opportunity to serve you and look forward to supporting your healthcare needs in the future. Please contact me with any questions or concerns that you may  have.    Sincerely,      Cindy Danielle RN, PA-C

## 2017-12-07 NOTE — NURSING NOTE
"Chief Complaint   Patient presents with     Musculoskeletal Problem     groin pain      *_* Health Care Directive *_*     packet given      Asthma       Initial /70 (BP Location: Left arm, Patient Position: Chair, Cuff Size: Adult Large)  Pulse 82  Temp 96.6  F (35.9  C) (Tympanic)  Wt 160 lb (72.6 kg)  SpO2 98%  Breastfeeding? No  BMI 28.34 kg/m2 Estimated body mass index is 28.34 kg/(m^2) as calculated from the following:    Height as of 4/19/17: 5' 3\" (1.6 m).    Weight as of this encounter: 160 lb (72.6 kg).  Medication Reconciliation: complete   Linda Dove CMA(AAMA)   "

## 2017-12-07 NOTE — MR AVS SNAPSHOT
After Visit Summary   12/7/2017    Anya Vicente    MRN: [id number]           Patient Information     Date Of Birth          1950        Visit Information        Provider Department      12/7/2017 10:45 AM Cindy Danielle PA [facility]        Today's Diagnoses     Right inguinal pain    -  1    Cough        Osteoarthritis of thoracic spine with myelopathy          Care Instructions      Thank you for choosing [facility].   I have office hours 8:00 am to 4:30 pm on Monday's, Wednesday's, Thursday's and Friday's. My nurse and I are out of the office every Tuesday.    Following your visit, when your labs and diagnostic testing have returned, I will review then and you will be contacted by my nurse.  If you are on My Chart, you can also view results there.    For refills, notify your pharmacy regarding what you need and the pharmacy will generate a refill request. Do not call my nurse as she is unable to process refill request. Please plan ahead and allow 3-5 days for refill requests.    You will generally receive a reminder call the day prior to your appointment.  If you cannot attend your appointment, please cancel your appointment with as much notice as possible.  If there is a pattern of failure to present for your appointments, I cannot provide consistent, meaningful, ongoing care for you. It is very important to me that you come in for your care, so we can best assist you with your health care needs.    IMPORTANT:  Please note that it is my standard of practice to NOT participate in prescribing ongoing requested Narcotic Analgesic therapy, and/or participate in the prescribing of other controlled substances.  My nurse and I am happy to assist you with the process of referral for alternative pain management as needed, and other treatment modalities including but not limited to:  Physical Therapy, Physical Medicine and Rehab, Counseling, Chiropractic Care,  Orthopedic Care, and non-narcotic medication management.     In the event that you need to be seen for emergent concerns and I am out of office,  please see one of my colleagues for acute concerns.  You may also present to  or ER.  I appreciate the opportunity to serve you and look forward to supporting your healthcare needs in the future. Please contact me with any questions or concerns that you may have.    Sincerely,      Cindy Danielle RN, PA-C               Follow-ups after your visit        Who to contact     If you have questions or need follow up information about today's clinic visit or your schedule please contact [facility] directly at [phone].  Normal or non-critical lab and imaging results will be communicated to you by MyChart, letter or phone within 4 business days after the clinic has received the results. If you do not hear from us within 7 days, please contact the clinic through [employer] or phone. If you have a critical or abnormal lab result, we will notify you by phone as soon as possible.  Submit refill requests through [employer] or call your pharmacy and they will forward the refill request to us. Please allow 3 business days for your refill to be completed.          Additional Information About Your Visit        [employer] Information     [employer] gives you secure access to your electronic health record. If you see a primary care provider, you can also send messages to your care team and make appointments. If you have questions, please call your primary care clinic.  If you do not have a primary care provider, please call [phone] and they will assist you.        Care EveryWhere ID     This is your Care EveryWhere ID. This could be used by other organizations to access your [facility] medical records  [id number]        Your Vitals Were     Pulse Temperature Pulse Oximetry Breastfeeding? BMI (Body Mass Index)       82 96.6  F (35.9  C) (Tympanic) 98% No 28.34 kg/m2         Blood Pressure from Last 3 Encounters:   12/07/17 126/70   04/19/17 122/64   01/12/17 123/78    Weight from Last 3 Encounters:   12/07/17 160 lb (72.6 kg)   04/19/17 158 lb 9.6 oz (71.9 kg)   12/06/16 160 lb (72.6 kg)              We Performed the Following     CBC with platelets differential     Comprehensive metabolic panel     EKG 12-lead complete w/read - (Clinic Performed)        Primary Care Provider Office Phone # Fax #    RADHAMES Avendaño [phone]       [facility] [address]  [place] [address]        Equal Access to Services     ONEL BARROS : Hadii tyree sy hadasho Soseanali, waaxda luqadaha, qaybta kaalmada adeegyada, jose carlos jones. So [facility] [phone].    ATENCIÓN: Si habla español, tiene a turner disposición servicios gratuitos de asistencia lingüística. Llame al [phone].    We comply with applicable federal civil rights laws and [place] laws. We do not discriminate on the basis of race, color, national origin, age, disability, sex, sexual orientation, or gender identity.            Thank you!     Thank you for choosing [facility] [place]  for your care. Our goal is always to provide you with excellent care. Hearing back from our patients is one way we can continue to improve our services. Please take a few minutes to complete the written survey that you may receive in the mail after your visit with us. Thank you!             Your Updated Medication List - Protect others around you: Learn how to safely use, store and throw away your medicines at www.disposemymeds.org.          This list is accurate as of: 12/7/17 12:47 PM.  Always use your most recent med list.                   Brand Name Dispense Instructions for use Diagnosis    * albuterol 108 (90 BASE) MCG/ACT Inhaler    VENTOLIN HFA    18 g    INHALE 2 PUFFS INTO THE LUNGS EVERY 4 HOURS AS NEEDED FOR SHORTNESS OFBREATH / DYSPNEA.    SOB (shortness of breath)       *  VENTOLIN  (90 BASE) MCG/ACT Inhaler   Generic drug:  albuterol     18 g    INHALE 2 PUFFS INTO THE LUNGS EVERY 4 HOURS AS NEEDED FOR SHORTNESSOF BREATH/DYSPNEA.    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)       buPROPion 300 MG 24 hr tablet    WELLBUTRIN XL    30 tablet    TAKE 1 TABLET BY MOUTH EVERY MORNING    Moderate episode of recurrent major depressive disorder (H)       CALCIUM 1200 PO      Take 1,200 mg by mouth daily        clobetasol 0.05 % cream    TEMOVATE    15 g    APPLY DAILY AS NEEDED -NO NOT USE LONGER THAN 2 WEEKS IN A ROW-GENERI C FOR TEMOVATE    Vaginitis       diazepam 5 MG tablet    VALIUM    30 tablet    Take 1 tablet (5 mg) by mouth every 12 hours as needed for muscle spasms    Lesion of sciatic nerve, left       fluticasone 50 MCG/ACT spray    FLONASE    32 g    2 SPRAYS IN EACH NOSTRIL DAILY - GENERIC FOR FLONASE    Seasonal allergic rhinitis       FREESTYLE LITE test strip   Generic drug:  blood glucose monitoring     100 each    TEST TWICE A DAY    Glucose intolerance (impaired glucose tolerance)       hydrochlorothiazide 50 MG tablet    HYDRODIURIL    90 tablet    TAKE 1 TABLET DAILY BY MOUTH    Diagnosis unknown       order for DME     1 Device    Equipment being ordered: knee sleeve    Knee pain, left       * order for DME     1 Act    Equipment being ordered: silver sneakers.    Generalized osteoarthrosis, unspecified site       * order for DME     1 Device    Equipment being ordered: thoracic/lumbrosacral support.    Scoliosis, Bulging discs       pramipexole 0.5 MG tablet    MIRAPEX    30 tablet    TAKE 1 TABLET BY MOUTH AT B EDTIME - GENERIC FOR MIRAPE X    Restless legs syndrome       predniSONE 20 MG tablet    DELTASONE    21 tablet    2 po qam x 7 d, 1 po q am x 7 d, then stop    Mild intermittent asthma without complication       traMADol 50 MG  tablet    ULTRAM    60 tablet    TAKE 1 TO 2 TABLETS BY MOUTH EVERY 6 HOURS AS NEEDED FOR MODERATE P AIN    Fibromyalgia       * Notice:  This list has 4 medication(s) that are the same as other medications prescribed for you. Read the directions carefully, and ask your doctor or other care provider to review them with you.

## 2017-12-07 NOTE — TELEPHONE ENCOUNTER
"Patient was in today and was advised to call back with the medication her insurance would cover and stated, \" Symbicort 160 mcg - 4.5 mcg is covered and my pharmacy is [facility].\"  "

## 2017-12-07 NOTE — PROGRESS NOTES
SUBJECTIVE:   Anya Vicente is a 67 year old female who presents to clinic today for the following health issues:        Asthma Follow-Up    Was ACT completed today?    Yes    ACT Total Scores 1/12/2017   ACT TOTAL SCORE -   ASTHMA ER VISITS -   ASTHMA HOSPITALIZATIONS -   ACT TOTAL SCORE (Goal Greater than or Equal to 20) 10   In the past 12 months, how many times did you visit the emergency room for your asthma without being admitted to the hospital? 0   In the past 12 months, how many times were you hospitalized overnight because of your asthma? 0       Recent asthma triggers that patient is dealing with: dust mites and strong odors and fumes        Amount of exercise or physical activity: None    Problems taking medications regularly: No    Medication side effects: none  Diet: regular (no restrictions)      Musculoskeletal problem/pain      Duration: 3-4 weeks     Description  Location: muscles in groin area , pulled on suitcase. In middle groin.     Intensity:  2/10    Accompanying signs and symptoms:     History  Previous similar problem: no   Previous evaluation:  none    Precipitating or alleviating factors:  Trauma or overuse: YES- patient unsure exactly   Aggravating factors include: moving right leg a certain way     Therapies tried and outcome: rest/inactivity              Problem list and histories reviewed & adjusted, as indicated.  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)     Advanced care planning/counseling discussion     Glucose intolerance (impaired glucose tolerance)     Insomnia     Major depressive disorder     Past Surgical History:   Procedure Laterality Date     BILATERAL CATARACT EXTRACTION  2006 01/01/2011     Bunion Surgery > RT  2010     COLONOSCOPY  12/23/2013    Procedure: COLONOSCOPY;  COLONOSCOPY;  Surgeon: Kasia Enrique DO;  Location: [facility] OR     D&C  1991 01/01/2011     deviated septum  repair  2012     ECHOCARDIOGRAM  2006    01/01/2011     HEMORRHOIDECTOMY  1979    01/01/2011     HYSTERECTOMY  1995    01/01/2011     LAPAROSCOPIC HERNIORRHAPHY VENTRAL  1/16/2014    Procedure: LAPAROSCOPIC HERNIORRHAPHY VENTRAL;  LAPAROSCOPIC VENTRAL HERNIA REPAIR W/ MESH;  Surgeon: Kasia Enrique DO;  Location: [facility] OR     leg repair after fx  >LT  2009     RELEASE CARPAL TUNNEL  2010    RT     TRIGGER  THUMB RELEASE  2011       Social History   Substance Use Topics     Smoking status: Never Smoker     Smokeless tobacco: Never Used     Alcohol use Yes      Comment: RARELY     Family History   Problem Relation Age of Onset     Alcohol/Drug Father 42     Motor Vehicle Accident due to Alcoholism - Cause of Death     DIABETES Maternal Uncle      Parkinsonism Maternal Uncle      Depression Mother      Hypertension Mother      CANCER Mother      Cervical     Parkinsonism Mother      Other - See Comments Sister      ADD/ADHD     Other - See Comments Sister      Fibromyalgia     DIABETES Sister      DIABETES Sister      Other - See Comments Sister      ADD/ADHD     Other - See Comments Son      ADD/ADHD     Other - See Comments Son      ADD/ADHD         Current Outpatient Prescriptions   Medication Sig Dispense Refill     buPROPion (WELLBUTRIN XL) 300 MG 24 hr tablet TAKE 1 TABLET BY MOUTH EVERY MORNING 30 tablet 0     VENTOLIN  (90 BASE) MCG/ACT Inhaler INHALE 2 PUFFS INTO THE LUNGS EVERY 4 HOURS AS NEEDED FOR SHORTNESSOF BREATH/DYSPNEA. 18 g 0     predniSONE (DELTASONE) 20 MG tablet 2 po qam x 7 d, 1 po q am x 7 d, then stop 21 tablet 1     amphetamine-dextroamphetamine (ADDERALL) 20 MG per tablet 2 tabs q AM, 1 tab q afternoon 90 tablet 0     hydrochlorothiazide (HYDRODIURIL) 50 MG tablet TAKE 1 TABLET DAILY BY MOUTH 90 tablet 1     pramipexole (MIRAPEX) 0.5 MG tablet TAKE 1 TABLET BY MOUTH AT B EDTIME - GENERIC FOR MIRAPE X 30 tablet 12     traMADol (ULTRAM) 50 MG tablet TAKE 1 TO 2 TABLETS BY MOUTH EVERY 6  "HOURS AS NEEDED FOR MODERATE P AIN 60 tablet 0     albuterol (VENTOLIN HFA) 108 (90 BASE) MCG/ACT inhaler INHALE 2 PUFFS INTO THE LUNGS EVERY 4 HOURS AS NEEDED FOR SHORTNESS OFBREATH / DYSPNEA. 18 g 12     clobetasol (TEMOVATE) 0.05 % cream APPLY DAILY AS NEEDED -NO NOT USE LONGER THAN 2 WEEKS IN A ROW-GENERI C FOR TEMOVATE 15 g 0     ORDER FOR DME Equipment being ordered: silver sneakers. 1 Act 0     ORDER FOR DME Equipment being ordered: thoracic/lumbrosacral support. 1 Device 1     diazepam (VALIUM) 5 MG tablet Take 1 tablet (5 mg) by mouth every 12 hours as needed for muscle spasms 30 tablet 0     ORDER FOR DME Equipment being ordered: knee sleeve 1 Device 6     Calcium Carbonate-Vit D-Min (CALCIUM 1200 PO) Take 1,200 mg by mouth daily        FREESTYLE LITE strip TEST TWICE A  each 0     fluticasone (FLONASE) 50 MCG/ACT nasal spray 2 SPRAYS IN EACH NOSTRIL DAILY - GENERIC FOR FLONASE 32 g 2     Allergies   Allergen Reactions     Acetaminophen      Darvocet-N 100       Codeine      Hydrocodone      \"Patient states can take liquid med but not pill\".     Propoxyphene Napsylate      Darvocet-N 100     BP Readings from Last 3 Encounters:   12/07/17 126/70   04/19/17 122/64   01/12/17 123/78    Wt Readings from Last 3 Encounters:   12/07/17 160 lb (72.6 kg)   04/19/17 158 lb 9.6 oz (71.9 kg)   12/06/16 160 lb (72.6 kg)                        Reviewed and updated as needed this visit by clinical staffAllergies       Reviewed and updated as needed this visit by Provider         ROS:  Constitutional, neuro, ENT, endocrine, pulmonary, cardiac, gastrointestinal, genitourinary, musculoskeletal, integument and psychiatric systems are negative, except as otherwise noted.      OBJECTIVE:                                                    /70 (BP Location: Left arm, Patient Position: Chair, Cuff Size: Adult Large)  Pulse 82  Temp 96.6  F (35.9  C) (Tympanic)  Wt 160 lb (72.6 kg)  SpO2 98%  Breastfeeding? No  " BMI 28.34 kg/m2  Body mass index is 28.34 kg/(m^2).  GENERAL APPEARANCE: healthy, alert and no distress  EYES: Eyes grossly normal to inspection, PERRL and conjunctivae and sclerae normal  HENT: ear canals and TM's normal and nose and mouth without ulcers or lesions  NECK: no adenopathy, no asymmetry, masses, or scars and thyroid normal to palpation  RESP: lungs clear to auscultation - no rales, rhonchi or wheezes  CV: regular rates and rhythm, normal S1 S2, no S3 or S4 and no murmur, click or rub  LYMPHATICS: normal ant/post cervical and supraclavicular nodes  ABDOMEN: soft, nontender, without hepatosplenomegaly or masses and bowel sounds normal  MS: has fair movement. Limited on internal rotation.  Can externally rotate fairly well.   No snap or click. No lower back pain that is exacerbated. Most pain in the leg.   SKIN: no suspicious lesions or rashes  NEURO: Normal strength and tone, mentation intact and speech normal  PSYCH: mentation appears normal and affect normal/bright    Diagnostic test results:  Diagnostic Test Results:  Results for orders placed or performed in visit on 12/07/17 (from the past 24 hour(s))   CBC with platelets differential   Result Value Ref Range    WBC 5.6 4.0 - 11.0 10e9/L    RBC Count 4.59 3.8 - 5.2 10e12/L    Hemoglobin 14.4 11.7 - 15.7 g/dL    Hematocrit 42.5 35.0 - 47.0 %    MCV 93 78 - 100 fl    MCH 31.4 26.5 - 33.0 pg    MCHC 33.9 31.5 - 36.5 g/dL    RDW 12.4 10.0 - 15.0 %    Platelet Count 355 150 - 450 10e9/L    Diff Method Automated Method     % Neutrophils 54.8 %    % Lymphocytes 35.0 %    % Monocytes 6.8 %    % Eosinophils 2.3 %    % Basophils 0.9 %    % Immature Granulocytes 0.2 %    Nucleated RBCs 0 0 /100    Absolute Neutrophil 3.0 1.6 - 8.3 10e9/L    Absolute Lymphocytes 1.9 0.8 - 5.3 10e9/L    Absolute Monocytes 0.4 0.0 - 1.3 10e9/L    Absolute Eosinophils 0.1 0.0 - 0.7 10e9/L    Absolute Basophils 0.1 0.0 - 0.2 10e9/L    Abs Immature Granulocytes 0.0 0 - 0.4 10e9/L     Absolute Nucleated RBC 0.0    Comprehensive metabolic panel   Result Value Ref Range    Sodium 140 133 - 144 mmol/L    Potassium 4.1 3.4 - 5.3 mmol/L    Chloride 107 94 - 109 mmol/L    Carbon Dioxide 29 20 - 32 mmol/L    Anion Gap 4 3 - 14 mmol/L    Glucose 101 (H) 70 - 99 mg/dL    Urea Nitrogen 14 7 - 30 mg/dL    Creatinine 0.81 0.52 - 1.04 mg/dL    GFR Estimate 70 >60 mL/min/1.7m2    GFR Estimate If Black 84 >60 mL/min/1.7m2    Calcium 8.6 8.5 - 10.1 mg/dL    Bilirubin Total 0.3 0.2 - 1.3 mg/dL    Albumin 3.4 3.4 - 5.0 g/dL    Protein Total 7.1 6.8 - 8.8 g/dL    Alkaline Phosphatase 83 40 - 150 U/L    ALT 23 0 - 50 U/L    AST 17 0 - 45 U/L          ASSESSMENT/PLAN:                                                    1. Right inguinal pain  Mild DJD and has pain in groin.  Ibuprofen and exercises with ROM encouraged.  If pain worsening MRI>   - XR HIP RT G/E 2 VW (Clinic Performed); Future    2. Cough  Has this in the winter last few years.  No sing of infection but coughing all the time, mostly dry and not productive.   Inhalers work very well for her.  Encouraged her to start them again.     - CBC with platelets differential  - Comprehensive metabolic panel  - EKG 12-lead complete w/read - (Clinic Performed)  - XR CHEST 2 VW (Clinic Performed); Future    3. Osteoarthritis of thoracic spine with myelopathy  encourage use of chiropractic. Good calcium and Vit D. Has scoliosis.  cxr is clear and no changes in her DJD.  Pain should be helped by good posture and good core support.           See Patient Instructions    RADHAMES Bowman  [facility] [place]

## 2017-12-08 RX ORDER — BUDESONIDE AND FORMOTEROL FUMARATE DIHYDRATE 160; 4.5 UG/1; UG/1
2 AEROSOL RESPIRATORY (INHALATION) 2 TIMES DAILY
Qty: 1 INHALER | Refills: 1 | Status: SHIPPED | OUTPATIENT
Start: 2017-12-08 | End: 2019-10-31

## 2017-12-08 ASSESSMENT — ANXIETY QUESTIONNAIRES: GAD7 TOTAL SCORE: 2

## 2017-12-08 ASSESSMENT — ASTHMA QUESTIONNAIRES: ACT_TOTALSCORE: 17

## 2017-12-24 DIAGNOSIS — R73.02 GLUCOSE INTOLERANCE (IMPAIRED GLUCOSE TOLERANCE): ICD-10-CM

## 2017-12-26 RX ORDER — LANCETS 28 GAUGE
EACH MISCELLANEOUS
Qty: 100 EACH | Refills: 3 | Status: SHIPPED | OUTPATIENT
Start: 2017-12-26 | End: 2018-01-03

## 2017-12-26 RX ORDER — BLOOD-GLUCOSE METER
KIT MISCELLANEOUS
Qty: 100 EACH | Refills: 3 | Status: SHIPPED | OUTPATIENT
Start: 2017-12-26 | End: 2018-01-03

## 2018-01-03 ENCOUNTER — TELEPHONE (OUTPATIENT)
Dept: FAMILY MEDICINE | Facility: OTHER | Age: 68
End: 2018-01-03

## 2018-01-03 DIAGNOSIS — R73.02 GLUCOSE INTOLERANCE (IMPAIRED GLUCOSE TOLERANCE): ICD-10-CM

## 2018-01-03 NOTE — TELEPHONE ENCOUNTER
Received fax from pharmacy that insurance will not pay for test strips for more then once a day if patient is not on insuline. Patient does not have DM diagnosis. Patient sent my chart message asking for a Rx for test strips so she could monitor her glucose levels as they have been elevated. 2/8/16 last A1c was 5.5. Glucose on 10/7/17 was 101. Can you change Rx to test once daily for insurance coverage reason.  Yessenia Lord LPN

## 2018-01-09 RX ORDER — LANCETS 28 GAUGE
EACH MISCELLANEOUS
Qty: 100 EACH | Refills: 3 | Status: SHIPPED | OUTPATIENT
Start: 2018-01-09 | End: 2018-12-26

## 2018-01-16 DIAGNOSIS — G47.419 NARCOLEPSY WITHOUT CATAPLEXY(347.00): ICD-10-CM

## 2018-01-16 RX ORDER — DEXTROAMPHETAMINE SACCHARATE, AMPHETAMINE ASPARTATE, DEXTROAMPHETAMINE SULFATE AND AMPHETAMINE SULFATE 5; 5; 5; 5 MG/1; MG/1; MG/1; MG/1
TABLET ORAL
Qty: 90 TABLET | Refills: 0 | Status: SHIPPED | OUTPATIENT
Start: 2018-01-16 | End: 2018-01-25

## 2018-01-25 DIAGNOSIS — G47.419 NARCOLEPSY WITHOUT CATAPLEXY(347.00): ICD-10-CM

## 2018-01-30 RX ORDER — DEXTROAMPHETAMINE SACCHARATE, AMPHETAMINE ASPARTATE, DEXTROAMPHETAMINE SULFATE AND AMPHETAMINE SULFATE 5; 5; 5; 5 MG/1; MG/1; MG/1; MG/1
TABLET ORAL
Qty: 90 TABLET | Refills: 0 | Status: SHIPPED | OUTPATIENT
Start: 2018-02-26 | End: 2018-05-08

## 2018-01-30 RX ORDER — DEXTROAMPHETAMINE SACCHARATE, AMPHETAMINE ASPARTATE, DEXTROAMPHETAMINE SULFATE AND AMPHETAMINE SULFATE 5; 5; 5; 5 MG/1; MG/1; MG/1; MG/1
TABLET ORAL
Qty: 90 TABLET | Refills: 0 | Status: SHIPPED | OUTPATIENT
Start: 2018-03-26 | End: 2018-05-08

## 2018-01-30 RX ORDER — DEXTROAMPHETAMINE SACCHARATE, AMPHETAMINE ASPARTATE, DEXTROAMPHETAMINE SULFATE AND AMPHETAMINE SULFATE 5; 5; 5; 5 MG/1; MG/1; MG/1; MG/1
TABLET ORAL
Qty: 90 TABLET | Refills: 0 | Status: SHIPPED | OUTPATIENT
Start: 2018-01-30 | End: 2018-05-08

## 2018-02-15 ENCOUNTER — TELEPHONE (OUTPATIENT)
Dept: SLEEP MEDICINE | Facility: HOSPITAL | Age: 68
End: 2018-02-15

## 2018-05-08 DIAGNOSIS — G47.419 NARCOLEPSY WITHOUT CATAPLEXY(347.00): ICD-10-CM

## 2018-05-15 RX ORDER — DEXTROAMPHETAMINE SACCHARATE, AMPHETAMINE ASPARTATE, DEXTROAMPHETAMINE SULFATE AND AMPHETAMINE SULFATE 5; 5; 5; 5 MG/1; MG/1; MG/1; MG/1
TABLET ORAL
Qty: 90 TABLET | Refills: 0 | Status: SHIPPED | OUTPATIENT
Start: 2018-07-09 | End: 2020-06-25

## 2018-05-15 RX ORDER — DEXTROAMPHETAMINE SACCHARATE, AMPHETAMINE ASPARTATE, DEXTROAMPHETAMINE SULFATE AND AMPHETAMINE SULFATE 5; 5; 5; 5 MG/1; MG/1; MG/1; MG/1
TABLET ORAL
Qty: 90 TABLET | Refills: 0 | Status: SHIPPED | OUTPATIENT
Start: 2018-06-08 | End: 2018-12-26

## 2018-05-15 RX ORDER — DEXTROAMPHETAMINE SACCHARATE, AMPHETAMINE ASPARTATE, DEXTROAMPHETAMINE SULFATE AND AMPHETAMINE SULFATE 5; 5; 5; 5 MG/1; MG/1; MG/1; MG/1
TABLET ORAL
Qty: 90 TABLET | Refills: 0 | Status: SHIPPED | OUTPATIENT
Start: 2018-05-15 | End: 2018-12-26

## 2018-05-17 ENCOUNTER — APPOINTMENT (OUTPATIENT)
Dept: GENERAL RADIOLOGY | Facility: HOSPITAL | Age: 68
End: 2018-05-17
Attending: NURSE PRACTITIONER
Payer: MEDICARE

## 2018-05-17 ENCOUNTER — HOSPITAL ENCOUNTER (EMERGENCY)
Facility: HOSPITAL | Age: 68
Discharge: HOME | End: 2018-05-17
Attending: NURSE PRACTITIONER | Admitting: NURSE PRACTITIONER
Payer: MEDICARE

## 2018-05-17 VITALS
TEMPERATURE: 96.6 F | HEART RATE: 84 BPM | RESPIRATION RATE: 16 BRPM | OXYGEN SATURATION: 98 % | DIASTOLIC BLOOD PRESSURE: 77 MMHG | SYSTOLIC BLOOD PRESSURE: 161 MMHG

## 2018-05-17 DIAGNOSIS — S90.121A CONTUSION OF TOE OF RIGHT FOOT, INITIAL ENCOUNTER: ICD-10-CM

## 2018-05-17 PROCEDURE — 73630 X-RAY EXAM OF FOOT: CPT | Mod: TC,RT

## 2018-05-17 PROCEDURE — G0463 HOSPITAL OUTPT CLINIC VISIT: HCPCS

## 2018-05-17 PROCEDURE — 99213 OFFICE O/P EST LOW 20 MIN: CPT | Performed by: NURSE PRACTITIONER

## 2018-05-17 ASSESSMENT — ENCOUNTER SYMPTOMS
ACTIVITY CHANGE: 1
DYSURIA: 0
TROUBLE SWALLOWING: 0
NUMBNESS: 0
FEVER: 0
COUGH: 0
WOUND: 0
PSYCHIATRIC NEGATIVE: 1
APPETITE CHANGE: 0
WEAKNESS: 0

## 2018-05-17 NOTE — ED NOTES
C/o right little toe pain for last week pt injured toe by hitting it on bathroom cabinet, pt has been icing and sveta taping

## 2018-05-17 NOTE — ED PROVIDER NOTES
History     Chief Complaint   Patient presents with     Toe Pain     right little toe. injured in 1 week ago. continuing to bother her.     The history is provided by the patient. No  was used.     Anya Vicente is a 68 year old female who presents with right pinky toe pain after she struck her foot on a cabinet 1 week ago. She's sveta taped her right pinky toe. Eating and drinking well. Bowel and bladder are working well. She's had bunion surgery with pin placement in right foot.       Problem List:    Patient Active Problem List    Diagnosis Date Noted     Major depressive disorder 01/16/2015     Priority: Medium     Insomnia 08/27/2014     Priority: Medium     Glucose intolerance (impaired glucose tolerance) 11/14/2013     Priority: Medium     Narcolepsy without 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) 07/31/2013     Priority: Medium     Advanced care planning/counseling discussion 10/10/2012     Priority: Medium        Past Medical History:    Past Medical History:   Diagnosis Date     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 02/22/2011     Depression 01/01/2011     Esophageal reflux 02/22/2011     Fibromyalgia      Insomnia 8/27/2014     Irritable bowel syndrome 02/22/2011     Menopausal or female climacteric states 02/21/2007     Mitral valve disorders(424.0) 02/22/2011     Myalgia and myositis, unspecified 02/25/2008     Narcolepsy      Other affections of shoulder region, not elsewhere 10/01/2008     Other follow-up examination(V67.59) 03/18/2005     Other screening mammogram 09/29/2006     Other specified pre-operative examination 05/04/2005     Posttraumatic stress disorder 02/22/2011     Restless legs syndrome (RLS) 02/22/2011     Sebaceous cyst 08/19/2005     Tourette's disorder 02/22/2011     Ventral hernia 09/15/2011       Past Surgical History:    Past Surgical History:   Procedure Laterality Date     BILATERAL CATARACT EXTRACTION  2006 01/01/2011     Bunion Surgery > RT  2010      COLONOSCOPY  12/23/2013    Procedure: COLONOSCOPY;  COLONOSCOPY;  Surgeon: Kasia Enrique DO;  Location: [facility]     D&C  1991    01/01/2011     deviated septum repair  2012     ECHOCARDIOGRAM  2006    01/01/2011     HEMORRHOIDECTOMY  1979    01/01/2011     HYSTERECTOMY  1995    01/01/2011     LAPAROSCOPIC HERNIORRHAPHY VENTRAL  1/16/2014    Procedure: LAPAROSCOPIC HERNIORRHAPHY VENTRAL;  LAPAROSCOPIC VENTRAL HERNIA REPAIR W/ MESH;  Surgeon: Kasia Enrique DO;  Location: [facility]     leg repair after fx  >LT  2009     RELEASE CARPAL TUNNEL  2010    RT     TRIGGER  THUMB RELEASE  2011       Family History:    Family History   Problem Relation Age of Onset     Alcohol/Drug Father 42     Motor Vehicle Accident due to Alcoholism - Cause of Death     DIABETES Maternal Uncle      Parkinsonism Maternal Uncle      Depression Mother      Hypertension Mother      CANCER Mother      Cervical     Parkinsonism Mother      Other - See Comments Sister      ADD/ADHD     Other - See Comments Sister      Fibromyalgia     DIABETES Sister      DIABETES Sister      Other - See Comments Sister      ADD/ADHD     Other - See Comments Son      ADD/ADHD     Other - See Comments Son      ADD/ADHD       Social History:  Marital Status:   [2]  Social History   Substance Use Topics     Smoking status: Never Smoker     Smokeless tobacco: Never Used     Alcohol use Yes      Comment: RARELY        Medications:      albuterol (VENTOLIN HFA) 108 (90 BASE) MCG/ACT inhaler   amphetamine-dextroamphetamine (ADDERALL) 20 MG per tablet   [START ON 6/8/2018] amphetamine-dextroamphetamine (ADDERALL) 20 MG per tablet   [START ON 7/9/2018] amphetamine-dextroamphetamine (ADDERALL) 20 MG per tablet   blood glucose monitoring (FREESTYLE LITE) test strip   blood glucose monitoring (FREESTYLE) lancets   budesonide-formoterol (SYMBICORT) 160-4.5 MCG/ACT Inhaler   buPROPion (WELLBUTRIN XL) 300 MG 24 hr tablet   Calcium Carbonate-Vit D-Min (CALCIUM 1200 PO)    clobetasol (TEMOVATE) 0.05 % cream   diazepam (VALIUM) 5 MG tablet   fluticasone (FLONASE) 50 MCG/ACT nasal spray   hydrochlorothiazide (HYDRODIURIL) 50 MG tablet   ORDER FOR DME   ORDER FOR DME   ORDER FOR DME   pramipexole (MIRAPEX) 0.5 MG tablet   predniSONE (DELTASONE) 20 MG tablet   traMADol (ULTRAM) 50 MG tablet   VENTOLIN  (90 BASE) MCG/ACT Inhaler         Review of Systems   Constitutional: Positive for activity change. Negative for appetite change and fever.   HENT: Negative for trouble swallowing.    Respiratory: Negative for cough.    Genitourinary: Negative for dysuria.   Musculoskeletal:        Right pinky toe pain.    Skin: Negative for rash and wound.   Neurological: Negative for weakness and numbness.        Denies numbness and tingling in right foot.    Psychiatric/Behavioral: Negative.        Physical Exam   BP: 161/77  Pulse: 84  Temp: 96.6  F (35.9  C)  Resp: 16  SpO2: 98 %      Physical Exam   Constitutional: She is oriented to person, place, and time. She appears well-developed and well-nourished. No distress.   HENT:   Head: Normocephalic.   Mouth/Throat: Oropharynx is clear and moist.   Neck: Normal range of motion. Neck supple.   Cardiovascular: Normal rate, regular rhythm, normal heart sounds and intact distal pulses.    No murmur heard.  Pulmonary/Chest: Effort normal. No respiratory distress.   Abdominal: Soft. She exhibits no distension.   Musculoskeletal: Normal range of motion. She exhibits tenderness. She exhibits no edema.   CMS and ROM intact to right lower extremity. Right dorsalis pedis +2. Extension and flexion intact to all digits on right foot.    Neurological: She is alert and oriented to person, place, and time. She exhibits normal muscle tone.   Skin: Skin is warm and dry. No rash noted. She is not diaphoretic.   Psychiatric: She has a normal mood and affect. Her behavior is normal.   Nursing note and vitals reviewed.      ED Course     ED Course     Procedures    I  personally reviewed the x-rays and there is NO fracture or dislocation. Radiology review pending and nurse will notify patient if there is any change in the treatment plan.    Results for orders placed or performed during the hospital encounter of 05/17/18   Foot  XR, G/E 3 views, right    Narrative    PROCEDURE: XR FOOT RT G/E 3 VW 5/17/2018 11:29 AM    HISTORY: injury;     COMPARISONS: None.    TECHNIQUE: 3 views.    FINDINGS: There is been previous bunion surgery. There is also been  previous surgery on the fifth metatarsal. 2 screws are seen in this  location.    No acute fracture or dislocation is seen. There is mild degenerative  change in several interphalangeal joints.         Impression    IMPRESSION: Postsurgical changes in first and fifth metatarsals with  mild degenerative change.    URIEL TRISTAN MD       Assessments & Plan (with Medical Decision Making)     Discussed plan of care. She verbalized understanding. All questions answered.     I have reviewed the nursing notes.    I have reviewed the findings, diagnosis, plan and need for follow up with the patient.  Discharged in stable condition.     New Prescriptions    No medications on file       Final diagnoses:   Contusion of toe of right foot, initial encounter     Take tylenol and/or ibuprofen as needed. Follow dosing on package.   Wear ortho shoe, ace wrap, and buddy tape toes until symptoms subside.   See RICE handout.   Follow up with PCP in 10 days if symptoms persist.   Return to urgent care or emergency department with any increase in symptoms or concerns.     LOW Panda  5/17/2018  11:16 AM  [facility]       Josefa Estrada NP  05/17/18 [id number]

## 2018-05-17 NOTE — ED AVS SNAPSHOT
[facility]    [address] [address]    Phone:  [phone]                                       Anya Vicente   MRN: [id number]    Department:  [facility]   Date of Visit:  5/17/2018           After Visit Summary Signature Page     I have received my discharge instructions, and my questions have been answered. I have discussed any challenges I see with this plan with the nurse or doctor.    ..........................................................................................................................................  Patient/Patient Representative Signature      ..........................................................................................................................................  Patient Representative Print Name and Relationship to Patient    ..................................................               ................................................  Date                                            Time    ..........................................................................................................................................  Reviewed by Signature/Title    ...................................................              ..............................................  Date                                                            Time

## 2018-05-17 NOTE — DISCHARGE INSTRUCTIONS
Take tylenol and/or ibuprofen as needed. Follow dosing on package.   Wear ortho shoe, ace wrap, and buddy tape toes until symptoms subside.   See RICE handout.   Follow up with PCP in 10 days if symptoms persist.   Return to urgent care or emergency department with any increase in symptoms or concerns.

## 2018-05-17 NOTE — ED AVS SNAPSHOT
[facility]    [address]    [place] [address]    Phone:  [phone]                                       Anya Vicente   MRN: [id number]    Department:  [facility]   Date of Visit:  5/17/2018           Patient Information     Date Of Birth          1950        Your diagnoses for this visit were:     Contusion of toe of right foot, initial encounter        You were seen by Josefa Estrada NP.      Follow-up Information     Follow up with Cindy Danielle PA In 10 days.    Specialty:  Family Practice    Why:  For re-evaluation if symptoms persist.     Contact information:    [address]  [place] [address] [phone]          Follow up with [facility].    Specialty:  EMERGENCY MEDICINE    Why:  As needed, If symptoms worsen    Contact information:    [address]  [place] [address] [phone]    Additional information:    From [facility]: Take [address]. Turn left at [address]. Turn left at the first stoplight on [address]. At the first stop sign, take a right onto [address]. Take a left into the parking lot and continue through until you reach the North enterance of the building.       From [place]: Take [address]. Take the [address] ramp towards [place]. Turn left onto [address]. Take a slight right onto [address]. Turn left at the first stoplight on [address]. At the first stop sign, take a right onto [address]. Take a left into the parking lot and continue through until you reach the North enterance of the building.       From [place]: Take [address]. Take a right at [address]. At the first stop sign, take a right onto [address]. Take a left into the parking lot and continue through until you reach the North enterance of the building.         Discharge Instructions       Take tylenol and/or ibuprofen as  needed. Follow dosing on package.   Wear ortho shoe, ace wrap, and buddy tape toes until symptoms subside.   See RICE handout.   Follow up with PCP in 10 days if symptoms persist.   Return to urgent care or emergency department with any increase in symptoms or concerns.         Discharge References/Attachments     BONE BRUISE (BONE CONTUSION), UNDERSTANDING (ENGLISH)    R.I.C.E. (ENGLISH)         Review of your medicines      Our records show that you are taking the medicines listed below. If these are incorrect, please call your family doctor or clinic.        Dose / Directions Last dose taken    * albuterol 108 (90 Base) MCG/ACT Inhaler   Commonly known as:  VENTOLIN HFA   Quantity:  18 g        INHALE 2 PUFFS INTO THE LUNGS EVERY 4 HOURS AS NEEDED FOR SHORTNESS OFBREATH / DYSPNEA.   Refills:  12        * VENTOLIN  (90 Base) MCG/ACT Inhaler   Quantity:  18 g   Generic drug:  albuterol        INHALE 2 PUFFS INTO THE LUNGS EVERY 4 HOURS AS NEEDED FOR SHORTNESSOF BREATH/DYSPNEA.   Refills:  0        * amphetamine-dextroamphetamine 20 MG per tablet   Commonly known as:  ADDERALL   Quantity:  90 tablet        2 tabs q AM, 1 tab q afternoon   Refills:  0        * amphetamine-dextroamphetamine 20 MG per tablet   Commonly known as:  ADDERALL   Quantity:  90 tablet   Start taking on:  6/8/2018        Take two tablets in the morning and one tablet at noon   Refills:  0        * amphetamine-dextroamphetamine 20 MG per tablet   Commonly known as:  ADDERALL   Quantity:  90 tablet   Start taking on:  7/9/2018        Take 2 tablets in the morning and 1 tablet at noon   Refills:  0        blood glucose monitoring lancets   Quantity:  100 each        Use to test blood sugar 1 times daily or as directed.   Refills:  3        blood glucose monitoring test strip   Commonly known as:  FREESTYLE LITE   Quantity:  100 each        Use to test blood sugar 1 times daily or as directed.   Refills:  3        budesonide-formoterol  160-4.5 MCG/ACT Inhaler   Commonly known as:  SYMBICORT   Dose:  2 puff   Quantity:  1 Inhaler        Inhale 2 puffs into the lungs 2 times daily   Refills:  1        buPROPion 300 MG 24 hr tablet   Commonly known as:  WELLBUTRIN XL   Quantity:  30 tablet        TAKE 1 TABLET BY MOUTH EVERY MORNING   Refills:  1        CALCIUM 1200 PO   Dose:  1200 mg        Take 1,200 mg by mouth daily   Refills:  0        clobetasol 0.05 % cream   Commonly known as:  TEMOVATE   Quantity:  15 g        APPLY DAILY AS NEEDED -NO NOT USE LONGER THAN 2 WEEKS IN A ROW-GENERI C FOR TEMOVATE   Refills:  0        diazepam 5 MG tablet   Commonly known as:  VALIUM   Dose:  5 mg   Quantity:  30 tablet        Take 1 tablet (5 mg) by mouth every 12 hours as needed for muscle spasms   Refills:  0        fluticasone 50 MCG/ACT spray   Commonly known as:  FLONASE   Quantity:  32 g        2 SPRAYS IN EACH NOSTRIL DAILY - GENERIC FOR FLONASE   Refills:  2        hydrochlorothiazide 50 MG tablet   Commonly known as:  HYDRODIURIL   Quantity:  90 tablet        TAKE 1 TABLET DAILY BY MOUTH   Refills:  2        order for DME   Quantity:  1 Device        Equipment being ordered: knee sleeve   Refills:  6        order for DME   Quantity:  1 Act        Equipment being ordered: silver sneakers.   Refills:  0        order for DME   Quantity:  1 Device        Equipment being ordered: thoracic/lumbrosacral support.   Refills:  1        pramipexole 0.5 MG tablet   Commonly known as:  MIRAPEX   Quantity:  30 tablet        TAKE 1 TABLET BY MOUTH AT BEDTIME - GENERIC FOR MIRAPEX   Refills:  10        predniSONE 20 MG tablet   Commonly known as:  DELTASONE   Quantity:  21 tablet        2 po qam x 7 d, 1 po q am x 7 d, then stop   Refills:  1        traMADol 50 MG tablet   Commonly known as:  ULTRAM   Quantity:  60 tablet        TAKE 1 TO 2 TABLETS BY MOUTH EVERY 6 HOURS AS NEEDED FOR MODERATE P AIN   Refills:  0        * Notice:  This list has 5 medication(s) that  are the same as other medications prescribed for you. Read the directions carefully, and ask your doctor or other care provider to review them with you.            Procedures and tests performed during your visit     Foot  XR, G/E 3 views, right      Orders Needing Specimen Collection     None      Pending Results     No orders found from 5/15/2018 to 5/18/2018.            Pending Culture Results     No orders found from 5/15/2018 to 5/18/2018.            Thank you for choosing [facility]       Thank you for choosing [facility] for your care. Our goal is always to provide you with excellent care. Hearing back from our patients is one way we can continue to improve our services. Please take a few minutes to complete the written survey that you may receive in the mail after you visit with us. Thank you!        Call [facility] Information     [facility] gives you secure access to your electronic health record. If you see a primary care provider, you can also send messages to your care team and make appointments. If you have questions, please call your primary care clinic.  If you do not have a primary care provider, please call [phone] and they will assist you.        Care EveryWhere ID     This is your Care EveryWhere ID. This could be used by other organizations to access your [facility] medical records  [id number]        Equal Access to Services     ONEL BARROS : Hadii tyree Ocampo, chay quintana, nelson nunez, jose carlos jones. So [facility] [phone].    ATENCIÓN: Si habla español, tiene a turner disposición servicios gratuitos de asistencia lingüística. Llame al [phone].    We comply with applicable federal civil rights laws and [place] laws. We do not discriminate on the basis of race, color, national origin, age, disability, sex, sexual orientation, or gender identity.            After Visit Summary       This is your record. Keep this with you and show to your  community pharmacist(s) and doctor(s) at your next visit.

## 2018-05-23 DIAGNOSIS — M79.7 FIBROMYALGIA: ICD-10-CM

## 2018-05-23 RX ORDER — TRAMADOL HYDROCHLORIDE 50 MG/1
TABLET ORAL
Qty: 60 TABLET | Refills: 0 | Status: SHIPPED | OUTPATIENT
Start: 2018-05-23 | End: 2019-03-26

## 2018-05-23 NOTE — TELEPHONE ENCOUNTER
tramadol      Last Written Prescription Date:  1/12/17  Last Fill Quantity: 60,   # refills: 0  Last Office Visit: 12/7/17  Future Office visit:       Routing refill request to provider for review/approval because:  Drug not on the [facility], P or [facility] refill protocol or controlled substance

## 2018-05-29 ENCOUNTER — TELEPHONE (OUTPATIENT)
Dept: SLEEP MEDICINE | Facility: HOSPITAL | Age: 68
End: 2018-05-29

## 2018-05-29 DIAGNOSIS — G47.419 NARCOLEPSY WITHOUT CATAPLEXY(347.00): ICD-10-CM

## 2018-06-08 ENCOUNTER — MYC MEDICAL ADVICE (OUTPATIENT)
Dept: FAMILY MEDICINE | Facility: OTHER | Age: 68
End: 2018-06-08

## 2018-06-20 DIAGNOSIS — F33.1 MODERATE EPISODE OF RECURRENT MAJOR DEPRESSIVE DISORDER (H): ICD-10-CM

## 2018-06-20 RX ORDER — BUPROPION HYDROCHLORIDE 300 MG/1
TABLET ORAL
Qty: 30 TABLET | Refills: 1 | Status: SHIPPED | OUTPATIENT
Start: 2018-06-20 | End: 2018-08-19

## 2018-07-31 ENCOUNTER — RADIANT APPOINTMENT (OUTPATIENT)
Dept: GENERAL RADIOLOGY | Facility: OTHER | Age: 68
End: 2018-07-31
Attending: FAMILY MEDICINE
Payer: MEDICARE

## 2018-07-31 ENCOUNTER — OFFICE VISIT (OUTPATIENT)
Dept: FAMILY MEDICINE | Facility: OTHER | Age: 68
End: 2018-07-31
Attending: FAMILY MEDICINE
Payer: MEDICARE

## 2018-07-31 VITALS
DIASTOLIC BLOOD PRESSURE: 72 MMHG | OXYGEN SATURATION: 98 % | TEMPERATURE: 97 F | SYSTOLIC BLOOD PRESSURE: 138 MMHG | HEART RATE: 88 BPM

## 2018-07-31 DIAGNOSIS — E78.2 MIXED HYPERLIPIDEMIA: ICD-10-CM

## 2018-07-31 DIAGNOSIS — M79.672 HEEL PAIN, BILATERAL: ICD-10-CM

## 2018-07-31 DIAGNOSIS — I10 BENIGN ESSENTIAL HYPERTENSION: Primary | ICD-10-CM

## 2018-07-31 DIAGNOSIS — E55.9 VITAMIN D DEFICIENCY: ICD-10-CM

## 2018-07-31 DIAGNOSIS — M79.671 HEEL PAIN, BILATERAL: ICD-10-CM

## 2018-07-31 DIAGNOSIS — Z23 NEED FOR VACCINATION: ICD-10-CM

## 2018-07-31 DIAGNOSIS — R73.02 GLUCOSE INTOLERANCE (IMPAIRED GLUCOSE TOLERANCE): ICD-10-CM

## 2018-07-31 LAB
ANION GAP SERPL CALCULATED.3IONS-SCNC: 4 MMOL/L (ref 3–14)
BUN SERPL-MCNC: 14 MG/DL (ref 7–30)
CALCIUM SERPL-MCNC: 8.6 MG/DL (ref 8.5–10.1)
CHLORIDE SERPL-SCNC: 108 MMOL/L (ref 94–109)
CHOLEST SERPL-MCNC: 186 MG/DL
CO2 SERPL-SCNC: 28 MMOL/L (ref 20–32)
CREAT SERPL-MCNC: 0.81 MG/DL (ref 0.52–1.04)
EST. AVERAGE GLUCOSE BLD GHB EST-MCNC: 120 MG/DL
GFR SERPL CREATININE-BSD FRML MDRD: 70 ML/MIN/1.7M2
GLUCOSE SERPL-MCNC: 108 MG/DL (ref 70–99)
HBA1C MFR BLD: 5.8 % (ref 0–5.6)
HDLC SERPL-MCNC: 85 MG/DL
LDLC SERPL CALC-MCNC: 91 MG/DL
NONHDLC SERPL-MCNC: 101 MG/DL
POTASSIUM SERPL-SCNC: 4 MMOL/L (ref 3.4–5.3)
SODIUM SERPL-SCNC: 140 MMOL/L (ref 133–144)
TRIGL SERPL-MCNC: 51 MG/DL
TSH SERPL DL<=0.005 MIU/L-ACNC: 1.67 MU/L (ref 0.4–4)
VIT B12 SERPL-MCNC: 480 PG/ML (ref 193–986)

## 2018-07-31 PROCEDURE — 84443 ASSAY THYROID STIM HORMONE: CPT | Mod: ZL | Performed by: FAMILY MEDICINE

## 2018-07-31 PROCEDURE — 80048 BASIC METABOLIC PNL TOTAL CA: CPT | Mod: ZL | Performed by: FAMILY MEDICINE

## 2018-07-31 PROCEDURE — 83036 HEMOGLOBIN GLYCOSYLATED A1C: CPT | Mod: ZL | Performed by: FAMILY MEDICINE

## 2018-07-31 PROCEDURE — 40000788 ZZHCL STATISTIC ESTIMATED AVERAGE GLUCOSE: Mod: ZL | Performed by: FAMILY MEDICINE

## 2018-07-31 PROCEDURE — 90471 IMMUNIZATION ADMIN: CPT | Performed by: FAMILY MEDICINE

## 2018-07-31 PROCEDURE — 99000 SPECIMEN HANDLING OFFICE-LAB: CPT | Performed by: FAMILY MEDICINE

## 2018-07-31 PROCEDURE — 73630 X-RAY EXAM OF FOOT: CPT | Mod: TC,RT

## 2018-07-31 PROCEDURE — G0463 HOSPITAL OUTPT CLINIC VISIT: HCPCS | Mod: 25 | Performed by: FAMILY MEDICINE

## 2018-07-31 PROCEDURE — 99213 OFFICE O/P EST LOW 20 MIN: CPT | Performed by: FAMILY MEDICINE

## 2018-07-31 PROCEDURE — 36415 COLL VENOUS BLD VENIPUNCTURE: CPT | Mod: ZL | Performed by: FAMILY MEDICINE

## 2018-07-31 PROCEDURE — 90670 PCV13 VACCINE IM: CPT | Performed by: FAMILY MEDICINE

## 2018-07-31 PROCEDURE — 82607 VITAMIN B-12: CPT | Mod: ZL | Performed by: FAMILY MEDICINE

## 2018-07-31 PROCEDURE — 82306 VITAMIN D 25 HYDROXY: CPT | Mod: ZL | Performed by: FAMILY MEDICINE

## 2018-07-31 PROCEDURE — 80061 LIPID PANEL: CPT | Mod: ZL | Performed by: FAMILY MEDICINE

## 2018-07-31 RX ORDER — ZOLPIDEM TARTRATE 5 MG/1
5 TABLET ORAL
COMMUNITY
End: 2018-08-03

## 2018-07-31 ASSESSMENT — ANXIETY QUESTIONNAIRES
5. BEING SO RESTLESS THAT IT IS HARD TO SIT STILL: NOT AT ALL
IF YOU CHECKED OFF ANY PROBLEMS ON THIS QUESTIONNAIRE, HOW DIFFICULT HAVE THESE PROBLEMS MADE IT FOR YOU TO DO YOUR WORK, TAKE CARE OF THINGS AT HOME, OR GET ALONG WITH OTHER PEOPLE: SOMEWHAT DIFFICULT
GAD7 TOTAL SCORE: 6
1. FEELING NERVOUS, ANXIOUS, OR ON EDGE: SEVERAL DAYS
3. WORRYING TOO MUCH ABOUT DIFFERENT THINGS: SEVERAL DAYS
7. FEELING AFRAID AS IF SOMETHING AWFUL MIGHT HAPPEN: SEVERAL DAYS
2. NOT BEING ABLE TO STOP OR CONTROL WORRYING: SEVERAL DAYS
4. TROUBLE RELAXING: SEVERAL DAYS
6. BECOMING EASILY ANNOYED OR IRRITABLE: SEVERAL DAYS

## 2018-07-31 ASSESSMENT — ASTHMA QUESTIONNAIRES
QUESTION_4 LAST FOUR WEEKS HOW OFTEN HAVE YOU USED YOUR RESCUE INHALER OR NEBULIZER MEDICATION (SUCH AS ALBUTEROL): TWO OR THREE TIMES PER WEEK
QUESTION_2 LAST FOUR WEEKS HOW OFTEN HAVE YOU HAD SHORTNESS OF BREATH: THREE TO SIX TIMES A WEEK
QUESTION_5 LAST FOUR WEEKS HOW WOULD YOU RATE YOUR ASTHMA CONTROL: SOMEWHAT CONTROLLED
QUESTION_3 LAST FOUR WEEKS HOW OFTEN DID YOUR ASTHMA SYMPTOMS (WHEEZING, COUGHING, SHORTNESS OF BREATH, CHEST TIGHTNESS OR PAIN) WAKE YOU UP AT NIGHT OR EARLIER THAN USUAL IN THE MORNING: TWO OR THREE NIGHTS A WEEK
ACT_TOTALSCORE: 15
QUESTION_1 LAST FOUR WEEKS HOW MUCH OF THE TIME DID YOUR ASTHMA KEEP YOU FROM GETTING AS MUCH DONE AT WORK, SCHOOL OR AT HOME: A LITTLE OF THE TIME

## 2018-07-31 ASSESSMENT — PAIN SCALES - GENERAL: PAINLEVEL: EXTREME PAIN (8)

## 2018-07-31 NOTE — PROGRESS NOTES
SUBJECTIVE:                                                    Anya Vicente is a 68 year old female who presents to clinic today for the following health issues:      Musculoskeletal problem/pain      Duration: about a year    Description  Location: both feet but left is worse    Intensity:  severe    Accompanying signs and symptoms: tingling, swelling and skin feels real tight from swelling    History  Previous similar problem: YES- fibromyalgia and has broken the left leg in the past. Wonders if that has any effect on it.  Previous evaluation:  none    Precipitating or alleviating factors:  Trauma or overuse: YES- broken left leg but not this current injury.  Aggravating factors include: standing, walking, climbing stairs and overuse    Therapies tried and outcome: rest/inactivity, heat, ice, massage with emu oil and orthotics    Patient is a 69 yo F with history of fibromyalgia, Prediabetes, narcolepsy who presents to clinic with bilateral heel pain. Pain feels like stabbing sensation. L>R. Feels her feet are swelling in the evenings some and can feel tight. Denies numbness/tingling. Pain is not worse in the AM, but after the course of the day. Heels will throb and ache at night, otherwise they are not painful unless she is walking on them. She does not drink etoh. No weight changes, fevers.     Problem list and histories reviewed & adjusted, as indicated.  Additional history: as documented    Labs reviewed in EPIC    ROS:  Constitutional, HEENT, cardiovascular, pulmonary, gi and gu systems are negative, except as otherwise noted.    OBJECTIVE:     /72  Pulse 88  Temp 97  F (36.1  C) (Tympanic)  SpO2 98%  There is no height or weight on file to calculate BMI.  GENERAL: healthy, alert and no distress  MS: Strength and ROM at ankle wnl. There is tenderness over heels bilaterally. No ttp over achilles or Metatarsals. There is no swelling. DP pulses are 2+, no skin changes.   NEURO: Sensation  intact in bilateral feet to temperature, vibration and monofilament testing was normal.     Diagnostic Test Results:  Exam: XR FOOT RT G/E 3 VW      History:Female, age 68 years, ; Heel pain, bilateral; Heel pain,  bilateral     Comparison:  5/17/2018     Technique: Three views are submitted.     Findings: Bones mildly osteopenic. No evidence of acute or subacute  fracture.  No evidence of dislocation.  Postoperative changes again  seen in the distal aspects of the first and fifth metatarsals. Mild  midfoot degenerative changes are similar in appearance. Healed  fracture suggested at the base of the fifth digit proximal phalanx.             Impression:  1.  No evidence of acute or subacute bony abnormality.      2.  Postoperative changes of the first and fifth metatarsals and  suggestion of a healed fracture in the proximal phalanx of the fifth  digit.     3.  Mild midfoot degenerative changes.     4.  Small plantar spur, similar in appearance compared to prior study.     RAFY SAWYER,     Exam: XR FOOT LT G/E 3 VW      History:Female, age 68 years, ; Heel pain, bilateral; Heel pain,  bilateral     Comparison:  None     Technique: Three views are submitted.     Findings: Bones mildly osteopenic. No evidence of acute or subacute  fracture.  No evidence of dislocation.  Hypertrophic spur along the  medial margin of the first metatarsal at the metatarsophalangeal joint  mild valgus angulation. Postoperative changes of the distal fibula.  Tiny calcific density suggesting a small os navicularis or perhaps an  old ununited avulsion fracture fragment.             Impression:  1.  No evidence of acute or subacute bony abnormality.      2.  Hypertrophic spurring of the first metatarsal at the  metatarsophalangeal joint with slight valgus angulation.     3.  Small plantar spur.     4.  Small osteophytic ureters or perhaps an old ununited avulsion  fracture fragment along the radial margin of the  navicular.    ASSESSMENT/PLAN:     Glucose intolerance (impaired glucose tolerance)  A1C updated today, no indication of neuropathy on exam.   - Hemoglobin A1c  - Vitamin B12    Benign essential hypertension / Mixed hyperlipidemia / Vitamin D deficiency  Labs updated today.   - TSH with free T4 reflex  - Basic metabolic panel  - Vitamin D Deficiency  - Lipid Profile (Chol, Trig, HDL, LDL calc)    Need for vaccination  - ADMIN 1st VACCINE  - Pneumococcal vaccine 13 valent PCV13 IM (Prevnar) [[id number]]    Heel pain, bilateral  Suspect plantar fasciitis. Pt has appt with podiatry coming up next month. Discussed conservative measures including stretching, nsaids, ice. Follow up with Podiatry.   - XR Foot Right G/E 3 Views; Future  - XR Foot Left G/E 3 Views; Future    Caitlyn Hurst MD  [facility]

## 2018-07-31 NOTE — MR AVS SNAPSHOT
After Visit Summary   7/31/2018    Anya Vicente    MRN: [id number]           Patient Information     Date Of Birth          1950        Visit Information        Provider Department      7/31/2018 9:30 AM Caitlyn Hurst MD [facility]        Today's Diagnoses     Benign essential hypertension    -  1    Glucose intolerance (impaired glucose tolerance)        Vitamin D deficiency        Mixed hyperlipidemia        Need for vaccination        Heel pain, bilateral           Follow-ups after your visit        Who to contact     If you have questions or need follow up information about today's clinic visit or your schedule please contact [facility] directly at [phone].  Normal or non-critical lab and imaging results will be communicated to you by MyChart, letter or phone within 4 business days after the clinic has received the results. If you do not hear from us within 7 days, please contact the clinic through [employer] or phone. If you have a critical or abnormal lab result, we will notify you by phone as soon as possible.  Submit refill requests through [employer] or call your pharmacy and they will forward the refill request to us. Please allow 3 business days for your refill to be completed.          Additional Information About Your Visit        MyChart Information     UserTesting gives you secure access to your electronic health record. If you see a primary care provider, you can also send messages to your care team and make appointments. If you have questions, please call your primary care clinic.  If you do not have a primary care provider, please call [phone] and they will assist you.        Care EveryWhere ID     This is your Care EveryWhere ID. This could be used by other organizations to access your [place] medical records  [id number]        Your Vitals Were     Pulse Temperature Pulse Oximetry             88 97  F (36.1  C) (Tympanic) 98%           Blood Pressure from Last 3 Encounters:   07/31/18 138/72   05/17/18 161/77   12/07/17 126/70    Weight from Last 3 Encounters:   12/07/17 160 lb (72.6 kg)   04/19/17 158 lb 9.6 oz (71.9 kg)   12/06/16 160 lb (72.6 kg)              We Performed the Following     ADMIN 1st VACCINE     Basic metabolic panel     Estimated Average Glucose     Hemoglobin A1c     Lipid Profile (Chol, Trig, HDL, LDL calc)     Pneumococcal vaccine 13 valent PCV13 IM (Prevnar) [[id number]]     TSH with free T4 reflex     Vitamin B12     Vitamin D Deficiency        Primary Care Provider Office Phone # Fax #    Cindy RADHAMES Lange [phone]       [address] [place] [id number]        Equal Access to Services     ONEL BARROS : Hadii tyree calio Sosyed, waaxda luqadaha, qaybta kaalmada adeegyada, jose carlos ward . So [facility] [phone].    ATENCIÓN: Si habla español, tiene a turner disposición servicios gratuitos de asistencia lingüística. Llame al [phone].    We comply with applicable federal civil rights laws and [place] laws. We do not discriminate on the basis of race, color, national origin, age, disability, sex, sexual orientation, or gender identity.            Thank you!     Thank you for choosing [facility]  for your care. Our goal is always to provide you with excellent care. Hearing back from our patients is one way we can continue to improve our services. Please take a few minutes to complete the written survey that you may receive in the mail after your visit with us. Thank you!             Your Updated Medication List - Protect others around you: Learn how to safely use, store and throw away your medicines at www.disposemymeds.org.          This list is accurate as of 7/31/18 12:58 PM.  Always use your most recent med list.                   Brand Name Dispense Instructions for use Diagnosis    * albuterol 108 (90 Base) MCG/ACT Inhaler    VENTOLIN HFA    18 g     INHALE 2 PUFFS INTO THE LUNGS EVERY 4 HOURS AS NEEDED FOR SHORTNESS OFBREATH / DYSPNEA.    SOB (shortness of breath)       * VENTOLIN  (90 Base) MCG/ACT Inhaler   Generic drug:  albuterol     18 g    INHALE 2 PUFFS INTO THE LUNGS EVERY 4 HOURS AS NEEDED FOR SHORTNESSOF BREATH/DYSPNEA.    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    Glucose intolerance (impaired glucose tolerance)       blood glucose monitoring test strip    FREESTYLE LITE    100 each    Use to test blood sugar 1 times daily or as directed.    Glucose intolerance (impaired glucose tolerance)       budesonide-formoterol 160-4.5 MCG/ACT Inhaler    SYMBICORT    1 Inhaler    Inhale 2 puffs into the lungs 2 times daily    Acute bronchospasm       buPROPion 300 MG 24 hr tablet    WELLBUTRIN XL    30 tablet    TAKE 1 TABLET BY MOUTH EVERY MORNING    Moderate episode of recurrent major depressive disorder (H)       CALCIUM 1200 PO      Take 1,200 mg by mouth daily        clobetasol 0.05 % cream    TEMOVATE    15 g    APPLY DAILY AS NEEDED -NO NOT USE LONGER THAN 2 WEEKS IN A ROW-GENERI C FOR TEMOVATE    Vaginitis       diazepam 5 MG tablet    VALIUM    30 tablet    Take 1 tablet (5 mg) by mouth every 12 hours as needed for muscle spasms    Lesion of sciatic nerve, left       fluticasone 50 MCG/ACT spray    FLONASE    32 g    2 SPRAYS IN EACH NOSTRIL DAILY - GENERIC FOR  FLONASE    Seasonal allergic rhinitis       hydrochlorothiazide 50 MG tablet    HYDRODIURIL    90 tablet    TAKE 1 TABLET DAILY BY MOUTH    Diagnosis unknown       order for DME     1 Device    Equipment being ordered: knee sleeve    Knee pain, left       order for DME     1 Act    Equipment being ordered: silver sneakers.    Generalized osteoarthrosis, unspecified site       order for DME     1 Device    Equipment being ordered: thoracic/lumbrosacral support.    Scoliosis, Bulging discs       pramipexole 0.5 MG tablet    MIRAPEX    30 tablet    TAKE 1 TABLET BY MOUTH AT BEDTIME - GENERIC FOR MIRAPEX    Restless legs syndrome       predniSONE 20 MG tablet    DELTASONE    21 tablet    2 po qam x 7 d, 1 po q am x 7 d, then stop    Mild intermittent asthma without complication       traMADol 50 MG tablet    ULTRAM    60 tablet    TAKE 1 TO 2 TABLETS BY MOUTH EVERY 6 HOURS AS NEEDED FOR MODERATE P AIN    Fibromyalgia       zolpidem 5 MG tablet    AMBIEN     Take 5 mg by mouth nightly as needed for sleep        * Notice:  This list has 5 medication(s) that are the same as other medications prescribed for you. Read the directions carefully, and ask your doctor or other care provider to review them with you.

## 2018-07-31 NOTE — NURSING NOTE
"Chief Complaint   Patient presents with     Musculoskeletal Problem       Initial /72  Pulse 88  Temp 97  F (36.1  C) (Tympanic)  SpO2 98% Estimated body mass index is 28.34 kg/(m^2) as calculated from the following:    Height as of 4/19/17: 5' 3\" (1.6 m).    Weight as of 12/7/17: 160 lb (72.6 kg).  Medication Reconciliation: complete    Janet Tan MA    "

## 2018-08-01 LAB — DEPRECATED CALCIDIOL+CALCIFEROL SERPL-MC: 31 UG/L (ref 20–75)

## 2018-08-01 ASSESSMENT — ASTHMA QUESTIONNAIRES: ACT_TOTALSCORE: 15

## 2018-08-01 ASSESSMENT — PATIENT HEALTH QUESTIONNAIRE - PHQ9: SUM OF ALL RESPONSES TO PHQ QUESTIONS 1-9: 10

## 2018-08-01 ASSESSMENT — ANXIETY QUESTIONNAIRES: GAD7 TOTAL SCORE: 6

## 2018-08-03 DIAGNOSIS — G47.00 INSOMNIA: Primary | ICD-10-CM

## 2018-08-03 DIAGNOSIS — F51.04 PSYCHOPHYSIOLOGICAL INSOMNIA: ICD-10-CM

## 2018-08-03 RX ORDER — ZOLPIDEM TARTRATE 5 MG/1
5 TABLET ORAL
Qty: 30 TABLET | Refills: 0 | Status: SHIPPED | OUTPATIENT
Start: 2018-08-03 | End: 2018-12-26

## 2018-08-03 NOTE — TELEPHONE ENCOUNTER
zolpidem (AMBIEN) 5 MG tablet     Last Written Prescription Date:  Historical only  Last Fill Quantity: ,   # refills:   Last Office Visit: 07/31/2018  Future Office visit:       Routing refill request to provider for review/approval because:  Medication is reported/historical

## 2018-08-10 DIAGNOSIS — G47.00 INSOMNIA, UNSPECIFIED TYPE: Primary | ICD-10-CM

## 2018-08-10 RX ORDER — ZOLPIDEM TARTRATE 10 MG/1
TABLET ORAL
Qty: 30 TABLET | Refills: 1 | Status: SHIPPED | OUTPATIENT
Start: 2018-08-10 | End: 2018-12-26

## 2018-08-10 NOTE — TELEPHONE ENCOUNTER
ambien- pharmacy requesting refilled 5 mg on 8/3 please advise    Last Written Prescription Date:  9/3/17  Last Fill Quantity: 30,   # refills: 0  Last Office Visit: 7/31/18  Future Office visit:       Routing refill request to provider for review/approval because:  Drug not on the [facility], [facility] or [facility] refill protocol or controlled substance  Drug not active on patient's medication list

## 2018-08-16 ENCOUNTER — TRANSFERRED RECORDS (OUTPATIENT)
Dept: HEALTH INFORMATION MANAGEMENT | Facility: CLINIC | Age: 68
End: 2018-08-16

## 2018-08-19 DIAGNOSIS — F33.1 MODERATE EPISODE OF RECURRENT MAJOR DEPRESSIVE DISORDER (H): ICD-10-CM

## 2018-08-20 RX ORDER — BUPROPION HYDROCHLORIDE 300 MG/1
TABLET ORAL
Qty: 30 TABLET | Refills: 0 | Status: SHIPPED | OUTPATIENT
Start: 2018-08-20 | End: 2018-10-25

## 2018-11-17 DIAGNOSIS — N76.0 VAGINITIS AND VULVOVAGINITIS: Primary | ICD-10-CM

## 2018-11-17 DIAGNOSIS — N76.0 VAGINITIS: ICD-10-CM

## 2018-11-19 RX ORDER — CLOBETASOL PROPIONATE 0.5 MG/G
CREAM TOPICAL
Qty: 15 G | Refills: 1 | Status: SHIPPED | OUTPATIENT
Start: 2018-11-19 | End: 2018-12-26

## 2018-11-20 ENCOUNTER — TELEPHONE (OUTPATIENT)
Dept: FAMILY MEDICINE | Facility: OTHER | Age: 68
End: 2018-11-20

## 2018-11-20 NOTE — TELEPHONE ENCOUNTER
11/20/18 Received PA request from Mulu Maguire for Clobetasol Propionate 0.05% cream. Submitted as urgent request through [facility]. Waiting for response.

## 2018-11-20 NOTE — TELEPHONE ENCOUNTER
"DENIAL 11/20/18 Received denial from [employer] for Clobetasol. Denial reason: \"The requested drug is not on patient's plan formulary list. Patient must have tried formulary drugs for the treatment of their condition. Alternatives are:   -Ala-vitor  -Alclometasone Dipropionate (generic of Aclovate) cream  -Alclometasone Dipropionate ointment  -Betamethasone Dipropionate (topical) cream; ointment  -Betamethasone Dipropionate (topical) lotion  -Betamethasone Dipropionate Augmented (generic of Diprolene AF) cream  -Betamethasone Dipropionate Augmented Gel  -Betamethasone Dipropionate Augmented (generic of Diprolene) lotion  -Betamethasone Dipropionate Augmented ointment  -Betamethasone Valerate cream; lotion; ointment  -Fluocinolone Acetonide (generic of Synalar) solution  -Fluocinonide cream 0.05%  -Fluocinonide gel  -Fluocinonide solution  -Fluocinonide Emulsified base 0.05%  -Fluticasone Propionate (generic of Cutivate) cream  -Fluticasone Propionate ointment  -Halobetasol Propionate (generic of Ultravate) cream; ointment  -Hydrocortisone Butyrate (generic of Locoid) cream; ointment; topical solution  -Mometasone Furoate (generic Elocon) cream; ointment; solution  -Triamcinolone Acetonide (topical) cream; ointment  -Triamcinolone Acetonide (topical) lotion\"  Please write new prescription for one of the following alternatives. Pharmacy advised. Forms scanned to [facility].  "

## 2018-12-17 DIAGNOSIS — G25.81 RESTLESS LEGS SYNDROME: ICD-10-CM

## 2018-12-18 RX ORDER — PRAMIPEXOLE DIHYDROCHLORIDE 0.5 MG/1
TABLET ORAL
Qty: 30 TABLET | Refills: 10 | Status: SHIPPED | OUTPATIENT
Start: 2018-12-18 | End: 2019-10-07

## 2018-12-18 NOTE — TELEPHONE ENCOUNTER
Protocol failed due to    CBC on record in past 12 months    ALT on record in past 12 months      Labs ordered. Pt due for f/u with PCP.     Please advise. Thank you!

## 2018-12-18 NOTE — TELEPHONE ENCOUNTER
Mirapex       Last Written Prescription Date:  1/22/18  Last Fill Quantity: 30,   # refills: 10  Last Office Visit: 7/31/18

## 2018-12-26 ENCOUNTER — OFFICE VISIT (OUTPATIENT)
Dept: FAMILY MEDICINE | Facility: OTHER | Age: 68
End: 2018-12-26
Attending: PHYSICIAN ASSISTANT
Payer: MEDICARE

## 2018-12-26 VITALS
WEIGHT: 157 LBS | SYSTOLIC BLOOD PRESSURE: 132 MMHG | OXYGEN SATURATION: 99 % | BODY MASS INDEX: 27.81 KG/M2 | DIASTOLIC BLOOD PRESSURE: 72 MMHG | HEART RATE: 94 BPM | TEMPERATURE: 96.9 F

## 2018-12-26 DIAGNOSIS — R25.2 LEG CRAMPS: Primary | ICD-10-CM

## 2018-12-26 DIAGNOSIS — N39.46 MIXED STRESS AND URGE URINARY INCONTINENCE: ICD-10-CM

## 2018-12-26 DIAGNOSIS — R10.11 ABDOMINAL PAIN, RIGHT UPPER QUADRANT: ICD-10-CM

## 2018-12-26 DIAGNOSIS — R73.02 GLUCOSE INTOLERANCE (IMPAIRED GLUCOSE TOLERANCE): ICD-10-CM

## 2018-12-26 DIAGNOSIS — L98.9 SKIN LESION OF CHEST WALL: ICD-10-CM

## 2018-12-26 DIAGNOSIS — R11.0 CHRONIC NAUSEA: ICD-10-CM

## 2018-12-26 DIAGNOSIS — F33.41 RECURRENT MAJOR DEPRESSIVE DISORDER, IN PARTIAL REMISSION (H): ICD-10-CM

## 2018-12-26 DIAGNOSIS — E55.9 VITAMIN D DEFICIENCY DISEASE: ICD-10-CM

## 2018-12-26 LAB
ALBUMIN SERPL-MCNC: 4 G/DL (ref 3.4–5)
ALBUMIN UR-MCNC: NEGATIVE MG/DL
ALP SERPL-CCNC: 80 U/L (ref 40–150)
ALT SERPL W P-5'-P-CCNC: 22 U/L (ref 0–50)
ANION GAP SERPL CALCULATED.3IONS-SCNC: 6 MMOL/L (ref 3–14)
APPEARANCE UR: CLEAR
AST SERPL W P-5'-P-CCNC: 12 U/L (ref 0–45)
BASOPHILS # BLD AUTO: 0.1 10E9/L (ref 0–0.2)
BASOPHILS NFR BLD AUTO: 1 %
BILIRUB SERPL-MCNC: 0.4 MG/DL (ref 0.2–1.3)
BILIRUB UR QL STRIP: NEGATIVE
BUN SERPL-MCNC: 23 MG/DL (ref 7–30)
CALCIUM SERPL-MCNC: 8.9 MG/DL (ref 8.5–10.1)
CHLORIDE SERPL-SCNC: 107 MMOL/L (ref 94–109)
CO2 SERPL-SCNC: 27 MMOL/L (ref 20–32)
COLOR UR AUTO: YELLOW
CREAT SERPL-MCNC: 0.9 MG/DL (ref 0.52–1.04)
DIFFERENTIAL METHOD BLD: NORMAL
EOSINOPHIL # BLD AUTO: 0.1 10E9/L (ref 0–0.7)
EOSINOPHIL NFR BLD AUTO: 1.4 %
ERYTHROCYTE [DISTWIDTH] IN BLOOD BY AUTOMATED COUNT: 12.3 % (ref 10–15)
EST. AVERAGE GLUCOSE BLD GHB EST-MCNC: 123 MG/DL
GFR SERPL CREATININE-BSD FRML MDRD: 64 ML/MIN/{1.73_M2}
GLUCOSE SERPL-MCNC: 104 MG/DL (ref 70–99)
GLUCOSE UR STRIP-MCNC: NEGATIVE MG/DL
HBA1C MFR BLD: 5.9 % (ref 0–5.6)
HCT VFR BLD AUTO: 40.6 % (ref 35–47)
HGB BLD-MCNC: 13.5 G/DL (ref 11.7–15.7)
HGB UR QL STRIP: NEGATIVE
KETONES UR STRIP-MCNC: NEGATIVE MG/DL
LEUKOCYTE ESTERASE UR QL STRIP: NEGATIVE
LYMPHOCYTES # BLD AUTO: 1.7 10E9/L (ref 0.8–5.3)
LYMPHOCYTES NFR BLD AUTO: 29.1 %
MAGNESIUM SERPL-MCNC: 2 MG/DL (ref 1.6–2.3)
MCH RBC QN AUTO: 31 PG (ref 26.5–33)
MCHC RBC AUTO-ENTMCNC: 33.3 G/DL (ref 31.5–36.5)
MCV RBC AUTO: 93 FL (ref 78–100)
MONOCYTES # BLD AUTO: 0.4 10E9/L (ref 0–1.3)
MONOCYTES NFR BLD AUTO: 6.9 %
NEUTROPHILS # BLD AUTO: 3.6 10E9/L (ref 1.6–8.3)
NEUTROPHILS NFR BLD AUTO: 61.6 %
NITRATE UR QL: NEGATIVE
PH UR STRIP: 5 PH (ref 5–7)
PLATELET # BLD AUTO: 350 10E9/L (ref 150–450)
POTASSIUM SERPL-SCNC: 3.9 MMOL/L (ref 3.4–5.3)
PROT SERPL-MCNC: 7.4 G/DL (ref 6.8–8.8)
RBC # BLD AUTO: 4.36 10E12/L (ref 3.8–5.2)
SODIUM SERPL-SCNC: 140 MMOL/L (ref 133–144)
SOURCE: NORMAL
SP GR UR STRIP: >1.03 (ref 1–1.03)
TSH SERPL DL<=0.005 MIU/L-ACNC: 1.25 MU/L (ref 0.4–4)
UROBILINOGEN UR STRIP-ACNC: 0.2 EU/DL (ref 0.2–1)
WBC # BLD AUTO: 5.9 10E9/L (ref 4–11)

## 2018-12-26 PROCEDURE — 82607 VITAMIN B-12: CPT | Mod: ZL | Performed by: PHYSICIAN ASSISTANT

## 2018-12-26 PROCEDURE — 99214 OFFICE O/P EST MOD 30 MIN: CPT | Mod: 25 | Performed by: PHYSICIAN ASSISTANT

## 2018-12-26 PROCEDURE — 84443 ASSAY THYROID STIM HORMONE: CPT | Mod: ZL | Performed by: PHYSICIAN ASSISTANT

## 2018-12-26 PROCEDURE — 80053 COMPREHEN METABOLIC PANEL: CPT | Mod: ZL | Performed by: PHYSICIAN ASSISTANT

## 2018-12-26 PROCEDURE — G0463 HOSPITAL OUTPT CLINIC VISIT: HCPCS

## 2018-12-26 PROCEDURE — 99000 SPECIMEN HANDLING OFFICE-LAB: CPT | Performed by: PHYSICIAN ASSISTANT

## 2018-12-26 PROCEDURE — 17110 DESTRUCTION B9 LES UP TO 14: CPT | Performed by: PHYSICIAN ASSISTANT

## 2018-12-26 PROCEDURE — 81003 URINALYSIS AUTO W/O SCOPE: CPT | Mod: ZL | Performed by: PHYSICIAN ASSISTANT

## 2018-12-26 PROCEDURE — 83036 HEMOGLOBIN GLYCOSYLATED A1C: CPT | Mod: ZL | Performed by: PHYSICIAN ASSISTANT

## 2018-12-26 PROCEDURE — 82306 VITAMIN D 25 HYDROXY: CPT | Mod: ZL | Performed by: PHYSICIAN ASSISTANT

## 2018-12-26 PROCEDURE — 40000788 ZZHCL STATISTIC ESTIMATED AVERAGE GLUCOSE: Mod: ZL | Performed by: PHYSICIAN ASSISTANT

## 2018-12-26 PROCEDURE — G0463 HOSPITAL OUTPT CLINIC VISIT: HCPCS | Mod: 25

## 2018-12-26 PROCEDURE — 85025 COMPLETE CBC W/AUTO DIFF WBC: CPT | Mod: ZL | Performed by: PHYSICIAN ASSISTANT

## 2018-12-26 PROCEDURE — 83735 ASSAY OF MAGNESIUM: CPT | Mod: ZL | Performed by: PHYSICIAN ASSISTANT

## 2018-12-26 PROCEDURE — 36415 COLL VENOUS BLD VENIPUNCTURE: CPT | Mod: ZL | Performed by: PHYSICIAN ASSISTANT

## 2018-12-26 RX ORDER — CYCLOBENZAPRINE HYDROCHLORIDE 15 MG/1
15 CAPSULE, EXTENDED RELEASE ORAL DAILY
Qty: 10 CAPSULE | Refills: 0 | Status: SHIPPED | OUTPATIENT
Start: 2018-12-26 | End: 2019-02-12

## 2018-12-26 RX ORDER — DICLOFENAC POTASSIUM 50 MG/1
TABLET, FILM COATED ORAL
Refills: 1 | COMMUNITY
Start: 2018-11-15 | End: 2019-02-12

## 2018-12-26 RX ORDER — IBUPROFEN 800 MG/1
TABLET, FILM COATED ORAL
COMMUNITY
Start: 2009-02-02 | End: 2019-10-31

## 2018-12-26 RX ORDER — DEXTROAMPHETAMINE SACCHARATE, AMPHETAMINE ASPARTATE, DEXTROAMPHETAMINE SULFATE AND AMPHETAMINE SULFATE 5; 5; 5; 5 MG/1; MG/1; MG/1; MG/1
TABLET ORAL
COMMUNITY
Start: 2018-12-13 | End: 2018-12-26 | Stop reason: DRUGHIGH

## 2018-12-26 ASSESSMENT — ANXIETY QUESTIONNAIRES
5. BEING SO RESTLESS THAT IT IS HARD TO SIT STILL: NOT AT ALL
2. NOT BEING ABLE TO STOP OR CONTROL WORRYING: NOT AT ALL
6. BECOMING EASILY ANNOYED OR IRRITABLE: SEVERAL DAYS
7. FEELING AFRAID AS IF SOMETHING AWFUL MIGHT HAPPEN: SEVERAL DAYS
IF YOU CHECKED OFF ANY PROBLEMS ON THIS QUESTIONNAIRE, HOW DIFFICULT HAVE THESE PROBLEMS MADE IT FOR YOU TO DO YOUR WORK, TAKE CARE OF THINGS AT HOME, OR GET ALONG WITH OTHER PEOPLE: NOT DIFFICULT AT ALL
1. FEELING NERVOUS, ANXIOUS, OR ON EDGE: NOT AT ALL
4. TROUBLE RELAXING: NOT AT ALL
3. WORRYING TOO MUCH ABOUT DIFFERENT THINGS: SEVERAL DAYS
GAD7 TOTAL SCORE: 3

## 2018-12-26 ASSESSMENT — PATIENT HEALTH QUESTIONNAIRE - PHQ9: SUM OF ALL RESPONSES TO PHQ QUESTIONS 1-9: 6

## 2018-12-26 ASSESSMENT — PAIN SCALES - GENERAL: PAINLEVEL: NO PAIN (0)

## 2018-12-26 NOTE — PROGRESS NOTES
"  SUBJECTIVE:   Anya Vicente is a 68 year old female who presents to clinic today for the following health issues:      {ACUTE Problem  - brief histories:721204}    {additional problems for provider to add:794592}    Problem list and histories reviewed & adjusted, as indicated.  Additional history: {NONE - AS DOCUMENTED:920162::\"as documented\"}    {HIST REVIEW/ LINKS 2:127434}    Reviewed and updated as needed this visit by clinical staff       Reviewed and updated as needed this visit by Provider         {PROVIDER CHARTING PREFERENCE:263771}  "

## 2018-12-26 NOTE — PROGRESS NOTES
SUBJECTIVE:   Anya Vicente is a 68 year old female who presents to clinic today for the following health issues:      Abdominal Pain      Duration: 4-5 months    Description (location/character/radiation): middle stomach above belly button       Associated flank pain: None    Intensity:  moderate    Accompanying signs and symptoms:        Fever/Chills: no        Gas/Bloating: YES- bloating       Nausea/vomitting: YES- nausea       Diarrhea: no        Dysuria or Hematuria: no     History (previous similar pain/trauma/previous testing): none    Precipitating or alleviating factors:       Pain worse with eating/BM/urination: none       Pain relieved by BM: no     Therapies tried and outcome: extra fluids    LMP:  not applicable    URINARY SYMPTOMS      Duration: couple years    Description  frequency, urgency and incontinence    Intensity:  severe    Accompanying signs and symptoms:  Fever/chills: no   Flank pain no   Nausea and vomiting: YES- nausea  Vaginal symptoms: none  Abdominal/Pelvic Pain: YES    History  History of frequent UTI's: no   History of kidney stones: no   Sexually Active: no   Possibility of pregnancy: No    Precipitating or alleviating factors: None    Therapies tried and outcome: kegal exercises and empty bladder as best as possible.   Outcome: none    lesion      Duration: over a year    Description (location/character/radiation): right side    Intensity:  moderate    Accompanying signs and symptoms: itchy, and has gotten bigger in size    History (similar episodes/previous evaluation): None    Precipitating or alleviating factors: None    Therapies tried and outcome: None       Depression Followup    Status since last visit: Improved a lot is on medical marijuana.   Has helped sleep and mood but not pain.     See PHQ-9 for current symptoms.  Other associated symptoms: None    Complicating factors:   Significant life event:  Kids got    Current substance abuse:  None  Anxiety  or Panic symptoms:  No    PHQ 6/22/2017 12/7/2017 7/31/2018   PHQ-9 Total Score 2 4 10   Q9: Suicide Ideation Not at all Not at all Not at all     Asthma Follow-Up    Was ACT completed today?    Yes    ACT Total Scores 7/31/2018   ACT TOTAL SCORE -   ASTHMA ER VISITS -   ASTHMA HOSPITALIZATIONS -   ACT TOTAL SCORE (Goal Greater than or Equal to 20) 15   In the past 12 months, how many times did you visit the emergency room for your asthma without being admitted to the hospital? 0   In the past 12 months, how many times were you hospitalized overnight because of your asthma? 0       Recent asthma triggers that patient is dealing with: Patient is unaware of triggers        Amount of exercise or physical activity: None    Problems taking medications regularly: No    Medication side effects: none    Diet: regular (no restrictions)         PHQ-9  English  PHQ-9   Any Language  Suicide Assessment Five-step Evaluation and Treatment (SAFE-T)    Problem list and histories reviewed & adjusted, as indicated.  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)     Advanced care planning/counseling discussion     Glucose intolerance (impaired glucose tolerance)     Insomnia     Major depressive disorder     Mixed stress and urge urinary incontinence     Past Surgical History:   Procedure Laterality Date     BILATERAL CATARACT EXTRACTION  2006 01/01/2011     Bunion Surgery > RT  2010     COLONOSCOPY  12/23/2013    Procedure: COLONOSCOPY;  COLONOSCOPY;  Surgeon: Kasia Enrique DO;  Location: [facility] OR     D&C  1991 01/01/2011     deviated septum repair  2012     ECHOCARDIOGRAM  2006 01/01/2011     HEMORRHOIDECTOMY  1979    01/01/2011     HYSTERECTOMY  1995    01/01/2011     LAPAROSCOPIC HERNIORRHAPHY VENTRAL  1/16/2014    Procedure: LAPAROSCOPIC HERNIORRHAPHY VENTRAL;  LAPAROSCOPIC VENTRAL HERNIA REPAIR W/ MESH;  Surgeon: Kasia Enrique DO;  Location: [facility]  OR     leg repair after fx  >LT  2009     RELEASE CARPAL TUNNEL  2010    RT     TRIGGER  THUMB RELEASE  2011       Social History     Tobacco Use     Smoking status: Never Smoker     Smokeless tobacco: Never Used   Substance Use Topics     Alcohol use: Yes     Comment: RARELY     Family History   Problem Relation Age of Onset     Alcohol/Drug Father 42        Motor Vehicle Accident due to Alcoholism - Cause of Death     Diabetes Maternal Uncle      Parkinsonism Maternal Uncle      Depression Mother      Hypertension Mother      Cancer Mother         Cervical     Parkinsonism Mother      Other - See Comments Sister         ADD/ADHD     Other - See Comments Sister         Fibromyalgia     Diabetes Sister      Diabetes Sister      Other - See Comments Sister         ADD/ADHD     Other - See Comments Son         ADD/ADHD     Other - See Comments Son         ADD/ADHD         Current Outpatient Medications   Medication Sig Dispense Refill     amphetamine-dextroamphetamine (ADDERALL) 20 MG per tablet Take 2 tablets in the morning and 1 tablet at noon 90 tablet 0     budesonide-formoterol (SYMBICORT) 160-4.5 MCG/ACT Inhaler Inhale 2 puffs into the lungs 2 times daily 1 Inhaler 1     Calcium Carbonate-Vit D-Min (CALCIUM 1200 PO) Take 1,200 mg by mouth daily        diazepam (VALIUM) 5 MG tablet Take 1 tablet (5 mg) by mouth every 12 hours as needed for muscle spasms 30 tablet 0     diclofenac (CATAFLAM) 50 MG tablet TAKE ONE TABLET TWO TIMES A DAY BY MOUTH WITH MEALS  1     hydrochlorothiazide (HYDRODIURIL) 50 MG tablet TAKE 1 TABLET DAILY BY MOUTH 90 tablet 2     medical cannabis (Patient's own supply.  Not a prescription) See Admin Instructions (This is NOT a prescription, and does not certify that the patient has a qualifying medical condition for medical cannabis.  The purpose of this order is  to document that the patient reports taking medical cannabis.)       ORDER FOR DME Equipment being ordered: silver sneakers. 1  "Act 0     ORDER FOR DME Equipment being ordered: thoracic/lumbrosacral support. 1 Device 1     ORDER FOR DME Equipment being ordered: knee sleeve 1 Device 6     pramipexole (MIRAPEX) 0.5 MG tablet TAKE 1 TABLET BY MOUTH AT BEDTIME - GENERIC FOR MIRAPEX 30 tablet 10     traMADol (ULTRAM) 50 MG tablet TAKE 1 TO 2 TABLETS BY MOUTH EVERY 6 HOURS AS NEEDED FOR MODERATE P AIN 60 tablet 0     ibuprofen (ADVIL/MOTRIN) 800 MG tablet        predniSONE (DELTASONE) 20 MG tablet 2 po qam x 7 d, 1 po q am x 7 d, then stop (Patient not taking: Reported on 12/26/2018.) 21 tablet 1     Allergies   Allergen Reactions     Acetaminophen      Darvocet-N 100       Bupropion Other (See Comments)     Increased anxiety     Clonidine      Dropped BP Drastically     Codamine Itching and Other (See Comments)     \"Buzzy\"     Codeine      Hydrocodone      \"Patient states can take liquid med but not pill\".     Meperidine      Oxycodone-Aspirin Itching, Nausea and Vomiting and Other (See Comments)     \"Buzzy\"     Pimozide Other (See Comments)     Drowsiness     Propoxyphene Napsylate      Darvocet-N 100     Seasonal Allergies Other (See Comments)     Seasonal allergies.  Per 7/3/07, runny nose, stuffy, plugged ears.     Recent Labs   Lab Test 07/31/18  0914 12/07/17  1145 02/08/16  1213 02/04/16  1126 04/29/15  1130 01/14/15  1306 11/14/13  0907   A1C 5.8*  --  5.5  --   --   --  5.8   LDL 91  --   --   --   --   --   --    HDL 85  --   --   --   --   --   --    TRIG 51  --   --   --   --   --   --    ALT  --  23  --  23  --  18 22   CR 0.81 0.81  --  0.78  --  0.76 0.84   GFRESTIMATED 70 70  --  74  --  76 68   GFRESTBLACK 85 84  --  89  --  >90   GFR Calc   82   POTASSIUM 4.0 4.1  --  4.0  --  4.5 4.1   TSH 1.67  --   --   --  1.51  --   --       BP Readings from Last 3 Encounters:   12/26/18 132/72   07/31/18 138/72   05/17/18 161/77    Wt Readings from Last 3 Encounters:   12/26/18 71.2 kg (157 lb)   12/07/17 72.6 kg (160 lb) "   04/19/17 71.9 kg (158 lb 9.6 oz)                    Reviewed and updated as needed this visit by clinical staff       Reviewed and updated as needed this visit by Provider         ROS:  Constitutional, neuro, ENT, endocrine, pulmonary, cardiac, gastrointestinal, genitourinary, musculoskeletal, integument and psychiatric systems are negative, except as otherwise noted.    OBJECTIVE:                                                    /72   Pulse 94   Temp 96.9  F (36.1  C) (Tympanic)   Wt 71.2 kg (157 lb)   SpO2 99%   BMI 27.81 kg/m    Body mass index is 27.81 kg/m .  GENERAL APPEARANCE: healthy, alert and no distress  EYES: Eyes grossly normal to inspection, PERRL and conjunctivae and sclerae normal  HENT: ear canals and TM's normal and nose and mouth without ulcers or lesions  NECK: no adenopathy, no asymmetry, masses, or scars and thyroid normal to palpation  RESP: lungs clear to auscultation - no rales, rhonchi or wheezes  CV: regular rates and rhythm, normal S1 S2, no S3 or S4 and no murmur, click or rub  LYMPHATICS: no cervical adenopathy  ABDOMEN: soft, nontender, without hepatosplenomegaly or masses and bowel sounds normal  MS: extremities normal- no gross deformities noted  SKIN: one lesion on right chest wall frozen x3 per protocol.   NEURO: Normal strength and tone, mentation intact and speech normal  PSYCH: mentation appears normal and affect normal/bright    Diagnostic test results:  Diagnostic Test Results:  Results for orders placed or performed in visit on 12/26/18   Magnesium   Result Value Ref Range    Magnesium 2.0 1.6 - 2.3 mg/dL   Vitamin B12   Result Value Ref Range    Vitamin B12 519 193 - 986 pg/mL   Vitamin D Deficiency   Result Value Ref Range    Vitamin D Deficiency screening 44 20 - 75 ug/L   *UA reflex to Microscopic and Culture - [facility]   Result Value Ref Range    Color Urine Yellow     Appearance Urine Clear     Glucose Urine Negative NEG^Negative mg/dL     Bilirubin Urine Negative NEG^Negative    Ketones Urine Negative NEG^Negative mg/dL    Specific Gravity Urine >1.030 1.003 - 1.035    Blood Urine Negative NEG^Negative    pH Urine 5.0 5.0 - 7.0 pH    Protein Albumin Urine Negative NEG^Negative mg/dL    Urobilinogen Urine 0.2 0.2 - 1.0 EU/dL    Nitrite Urine Negative NEG^Negative    Leukocyte Esterase Urine Negative NEG^Negative    Source Midstream Urine    Hemoglobin A1c   Result Value Ref Range    Hemoglobin A1C 5.9 (H) 0 - 5.6 %   Comprehensive metabolic panel   Result Value Ref Range    Sodium 140 133 - 144 mmol/L    Potassium 3.9 3.4 - 5.3 mmol/L    Chloride 107 94 - 109 mmol/L    Carbon Dioxide 27 20 - 32 mmol/L    Anion Gap 6 3 - 14 mmol/L    Glucose 104 (H) 70 - 99 mg/dL    Urea Nitrogen 23 7 - 30 mg/dL    Creatinine 0.90 0.52 - 1.04 mg/dL    GFR Estimate 64 >60 mL/min/[1.73_m2]    GFR Estimate If Black 74 >60 mL/min/[1.73_m2]    Calcium 8.9 8.5 - 10.1 mg/dL    Bilirubin Total 0.4 0.2 - 1.3 mg/dL    Albumin 4.0 3.4 - 5.0 g/dL    Protein Total 7.4 6.8 - 8.8 g/dL    Alkaline Phosphatase 80 40 - 150 U/L    ALT 22 0 - 50 U/L    AST 12 0 - 45 U/L   TSH with free T4 reflex   Result Value Ref Range    TSH 1.25 0.40 - 4.00 mU/L   CBC with platelets and differential   Result Value Ref Range    WBC 5.9 4.0 - 11.0 10e9/L    RBC Count 4.36 3.8 - 5.2 10e12/L    Hemoglobin 13.5 11.7 - 15.7 g/dL    Hematocrit 40.6 35.0 - 47.0 %    MCV 93 78 - 100 fl    MCH 31.0 26.5 - 33.0 pg    MCHC 33.3 31.5 - 36.5 g/dL    RDW 12.3 10.0 - 15.0 %    Platelet Count 350 150 - 450 10e9/L    % Neutrophils 61.6 %    % Lymphocytes 29.1 %    % Monocytes 6.9 %    % Eosinophils 1.4 %    % Basophils 1.0 %    Absolute Neutrophil 3.6 1.6 - 8.3 10e9/L    Absolute Lymphocytes 1.7 0.8 - 5.3 10e9/L    Absolute Monocytes 0.4 0.0 - 1.3 10e9/L    Absolute Eosinophils 0.1 0.0 - 0.7 10e9/L    Absolute Basophils 0.1 0.0 - 0.2 10e9/L    Diff Method Automated Method    Estimated Average Glucose   Result Value Ref  Range    Estimated Average Glucose 123 mg/dL        ASSESSMENT/PLAN:                                                    1. Leg cramps  Her legs are cramping all the time.  Has been on Cyclobenzaprine in the past. Wearing off at n ight. We will check levels and try long acting medications.   - Magnesium  - Vitamin B12  - cyclobenzaprine ER (AMRIX) 15 MG 24 hr capsule; Take 1 capsule (15 mg) by mouth daily for 10 days  Dispense: 10 capsule; Refill: 0  - TSH with free T4 reflex  - CBC with platelets and differential    2. Vitamin D deficiency disease  Checking level. recommending dose and taking with Vit C.   - Vitamin D Deficiency    3. Mixed stress and urge urinary incontinence  Having this issue for quite some time. Make sure there is no infection.   - *UA reflex to Microscopic and Culture - [facility] [place]  - UROLOGY ADULT REFERRAL    4. Recurrent major depressive disorder, in partial remission (H)  Feeling pretty good is retired on disability. Feeling better as not as stressed. Is traveling now and doing other things in her life that help her stay active and feels more engaged. Continue medications.     5. Glucose intolerance (impaired glucose tolerance)  Showing elevation in her sugars. Weight is up a little bit discussion on carb control and weight management.   - Hemoglobin A1c  - Comprehensive metabolic panel        Follow up with Provider - 6 months.      RADHAMES Bowman  [facility]

## 2018-12-26 NOTE — PROGRESS NOTES
"  SUBJECTIVE:   Anya Vicente is a 68 year old female who presents to clinic today for the following health issues:      URINARY TRACT SYMPTOMS      Duration: ***    Description  { SYMPTOMS FEMALE:751684}    Intensity:  {mild,moderate,severe:776278}    Accompanying signs and symptoms:  Fever/chills: { :983304}  Flank pain { :437086}  Nausea and vomiting: { :896258}  Vaginal symptoms: {VAGINAL SYMPTOMS:898894::\"none\"}  Abdominal/Pelvic Pain: { :203950}    History  History of frequent UTI's: { :113115}  History of kidney stones: { :586591}  Sexually Active: { :532343}  Possibility of pregnancy: { :531863::\"No\"}    Precipitating or alleviating factors: {NONE DEFAULTED:688051::\"None\"}    Therapies tried and outcome: {URINARY TREATMENTS FEMALE:455067::\"none\"}   Outcome: ***      Depression Followup    Status since last visit: {STABLE/WORSENED/IMPROVED:798183::\"Stable ***\"}    See PHQ-9 for current symptoms.  Other associated symptoms: {NONE DEFAULTED:701461::\"None\"}    Complicating factors:   Significant life event:  {NO/YES :707315::\"No\"}   Current substance abuse:  {Substance Use:644909::\"None\"}  Anxiety or Panic symptoms:  {NO/YES :357834::\"No\"}    PHQ 6/22/2017 12/7/2017 7/31/2018   PHQ-9 Total Score 2 4 10   Q9: Suicide Ideation Not at all Not at all Not at all     {PROVIDER ONLY Complete follow-up questions for patients who report suicide ideation  (Optional):689648}  PHQ-9  English  PHQ-9   Any Language  Suicide Assessment Five-step Evaluation and Treatment (SAFE-T)    Problem list and histories reviewed & adjusted, as indicated.  Additional history: {NONE - AS DOCUMENTED:173568::\"as documented\"}    {HIST REVIEW/ LINKS 2:666608}    Reviewed and updated as needed this visit by clinical staff       Reviewed and updated as needed this visit by Provider         {PROVIDER CHARTING PREFERENCE:484512}  "

## 2018-12-26 NOTE — PATIENT INSTRUCTIONS
Thank you for choosing [facility].   I have office hours 8:00 am to 4:30 pm on Monday's, Wednesday's, Thursday's and Friday's. My nurse and I are out of the office every Tuesday.    Following your visit, when your labs and diagnostic testing have returned, I will review then and you will be contacted by my nurse.  If you are on My Chart, you can also view results there.    For refills, notify your pharmacy regarding what you need and the pharmacy will generate a refill request. Do not call my nurse as she is unable to process refill request. Please plan ahead and allow 3-5 days for refill requests.    You will generally receive a reminder call the day prior to your appointment.  If you cannot attend your appointment, please cancel your appointment with as much notice as possible.  If there is a pattern of failure to present for your appointments, I cannot provide consistent, meaningful, ongoing care for you. It is very important to me that you come in for your care, so we can best assist you with your health care needs.    IMPORTANT:  Please note that it is my standard of practice to NOT participate in prescribing ongoing requested Narcotic Analgesic therapy, and/or participate in the prescribing of other controlled substances.  My nurse and I am happy to assist you with the process of referral for alternative pain management as needed, and other treatment modalities including but not limited to:  Physical Therapy, Physical Medicine and Rehab, Counseling, Chiropractic Care, Orthopedic Care, and non-narcotic medication management.     In the event that you need to be seen for emergent concerns and I am out of office,  please see one of my colleagues for acute concerns.  You may also present to  or ER.  I appreciate the opportunity to serve you and look forward to supporting your healthcare needs in the future. Please contact me with any questions or concerns that you may  have.    Sincerely,      Cindy Danielle RN, PA-C

## 2018-12-26 NOTE — NURSING NOTE
"Chief Complaint   Patient presents with     Incontinence     Abdominal Pain     Lesion     Musculoskeletal Problem     nasreen horses       Initial /72   Pulse 94   Temp 96.9  F (36.1  C) (Tympanic)   Wt 71.2 kg (157 lb)   SpO2 99%   BMI 27.81 kg/m   Estimated body mass index is 27.81 kg/m  as calculated from the following:    Height as of 4/19/17: 1.6 m (5' 3\").    Weight as of this encounter: 71.2 kg (157 lb).  Medication Reconciliation: complete    Janet Tan MA    "

## 2018-12-26 NOTE — LETTER
My Asthma Action Plan  Name: Anya Vicente   YOB: 1950  Date: 12/26/2018   My doctor: RADHAMES Bowman   My clinic: [facility]        My Control Medicine: Budesonide + formoterol (Symbicort) -  160/4.5 mcg PRN  My Rescue Medicine: none  My Oral Steroid Medicine: prednisone My Asthma Severity: intermittent  Avoid your asthma triggers: humidity and exercise or sports  dust mites  strong odors and fumes            GREEN ZONE   Good Control    I feel good    No cough or wheeze    Can work, sleep and play without asthma symptoms       Take your asthma control medicine every day.     1. If exercise triggers your asthma, take your rescue medication    15 minutes before exercise or sports, and    During exercise if you have asthma symptoms  2. Spacer to use with inhaler: If you have a spacer, make sure to use it with your inhaler             YELLOW ZONE Getting Worse  I have ANY of these:    I do not feel good    Cough or wheeze    Chest feels tight    Wake up at night   1. Keep taking your Green Zone medications  2. Start taking your rescue medicine:    every 20 minutes for up to 1 hour. Then every 4 hours for 24-48 hours.  3. If you stay in the Yellow Zone for more than 12-24 hours, contact your doctor.  4. If you do not return to the Green Zone in 12-24 hours or you get worse, start taking your oral steroid medicine if prescribed by your provider.           RED ZONE Medical Alert - Get Help  I have ANY of these:    I feel awful    Medicine is not helping    Breathing getting harder    Trouble walking or talking    Nose opens wide to breathe       1. Take your rescue medicine NOW  2. If your provider has prescribed an oral steroid medicine, start taking it NOW  3. Call your doctor NOW  4. If you are still in the Red Zone after 20 minutes and you have not reached your doctor:    Take your rescue medicine again and    Call 911 or go to the emergency room right away    See  your regular doctor within 2 weeks of an Emergency Room or Urgent Care visit for follow-up treatment.          Annual Reminders:  Meet with Asthma Educator,  Flu Shot in the Fall, consider Pneumonia Vaccination for patients with asthma (aged 19 and older).    Pharmacy:    [id number] - [place], [place] - [address]  [id number] - [place], [place] - [address]                       Asthma Triggers  How To Control Things That Make Your Asthma Worse    Triggers are things that make your asthma worse.  Look at the list below to help you find your triggers and what you can do about them.  You can help prevent asthma flare-ups by staying away from your triggers.      Trigger                                                          What you can do   Cigarette Smoke  Tobacco smoke can make asthma worse. Do not allow smoking in your home, car or around you.  Be sure no one smokes at a child s day care or school.  If you smoke, ask your health care provider for ways to help you quit.  Ask family members to quit too.  Ask your health care provider for a referral to Quit Plan to help you quit smoking, or call 7-333-816-PLAN.     Colds, Flu, Bronchitis  These are common triggers of asthma. Wash your hands often.  Don t touch your eyes, nose or mouth.  Get a flu shot every year.     Dust Mites  These are tiny bugs that live in cloth or carpet. They are too small to see. Wash sheets and blankets in hot water every week.   Encase pillows and mattress in dust mite proof covers.  Avoid having carpet if you can. If you have carpet, vacuum weekly.   Use a dust mask and HEPA vacuum.   Pollen and Outdoor Mold  Some people are allergic to trees, grass, or weed pollen, or molds. Try to keep your windows closed.  Limit time out doors when pollen count is high.   Ask you health care provider about taking medicine during allergy season.     Animal Dander  Some people are allergic to skin flakes, urine or saliva from  pets with fur or feathers. Keep pets with fur or feathers out of your home.    If you can t keep the pet outdoors, then keep the pet out of your bedroom.  Keep the bedroom door closed.  Keep pets off cloth furniture and away from stuffed toys.     Mice, Rats, and Cockroaches  Some people are allergic to the waste from these pests.   Cover food and garbage.  Clean up spills and food crumbs.  Store grease in the refrigerator.   Keep food out of the bedroom.   Indoor Mold  This can be a trigger if your home has high moisture. Fix leaking faucets, pipes, or other sources of water.   Clean moldy surfaces.  Dehumidify basement if it is damp and smelly.   Smoke, Strong Odors, and Sprays  These can reduce air quality. Stay away from strong odors and sprays, such as perfume, powder, hair spray, paints, smoke incense, paint, cleaning products, candles and new carpet.   Exercise or Sports  Some people with asthma have this trigger. Be active!  Ask your doctor about taking medicine before sports or exercise to prevent symptoms.    Warm up for 5-10 minutes before and after sports or exercise.     Other Triggers of Asthma  Cold air:  Cover your nose and mouth with a scarf.  Sometimes laughing or crying can be a trigger.  Some medicines and food can trigger asthma.

## 2018-12-27 LAB — VIT B12 SERPL-MCNC: 519 PG/ML (ref 193–986)

## 2018-12-27 ASSESSMENT — ANXIETY QUESTIONNAIRES: GAD7 TOTAL SCORE: 3

## 2018-12-28 ENCOUNTER — TELEPHONE (OUTPATIENT)
Dept: FAMILY MEDICINE | Facility: OTHER | Age: 68
End: 2018-12-28

## 2018-12-28 DIAGNOSIS — G25.81 RESTLESS LEGS SYNDROME (RLS): Primary | ICD-10-CM

## 2018-12-28 LAB — DEPRECATED CALCIDIOL+CALCIFEROL SERPL-MC: 44 UG/L (ref 20–75)

## 2018-12-28 RX ORDER — CYCLOBENZAPRINE HCL 5 MG
5 TABLET ORAL 3 TIMES DAILY PRN
Qty: 30 TABLET | Refills: 3 | Status: SHIPPED | OUTPATIENT
Start: 2018-12-28 | End: 2019-05-31

## 2018-12-28 NOTE — TELEPHONE ENCOUNTER
I just called the pharmacy about the Amrix PA and they are telling me that this patient's copay for 10 of the extended release pills will cost her $600. They tell me that even a PA would still cost the patient a high copay. They are suggesting a regular Cyclobenzaprine 5mg tablet. They are stating that 30 of them would cost the patient $11. Would you be willing to make this change with her medication? Please advise if you'd like me to still attempt the PA. Thank you!

## 2019-01-04 ENCOUNTER — HOSPITAL ENCOUNTER (OUTPATIENT)
Dept: ULTRASOUND IMAGING | Facility: HOSPITAL | Age: 69
Discharge: HOME | End: 2019-01-04
Attending: PHYSICIAN ASSISTANT | Admitting: PHYSICIAN ASSISTANT
Payer: MEDICARE

## 2019-01-04 DIAGNOSIS — R11.0 CHRONIC NAUSEA: ICD-10-CM

## 2019-01-04 DIAGNOSIS — R10.11 ABDOMINAL PAIN, RIGHT UPPER QUADRANT: ICD-10-CM

## 2019-01-04 PROCEDURE — 76705 ECHO EXAM OF ABDOMEN: CPT | Mod: TC

## 2019-02-12 ENCOUNTER — OFFICE VISIT (OUTPATIENT)
Dept: UROLOGY | Facility: OTHER | Age: 69
End: 2019-02-12
Attending: PHYSICIAN ASSISTANT
Payer: MEDICARE

## 2019-02-12 VITALS
RESPIRATION RATE: 16 BRPM | SYSTOLIC BLOOD PRESSURE: 130 MMHG | HEIGHT: 64 IN | DIASTOLIC BLOOD PRESSURE: 70 MMHG | TEMPERATURE: 97.1 F | OXYGEN SATURATION: 94 % | HEART RATE: 91 BPM | WEIGHT: 157 LBS | BODY MASS INDEX: 26.8 KG/M2

## 2019-02-12 DIAGNOSIS — H91.93 DECREASED HEARING OF BOTH EARS: Primary | ICD-10-CM

## 2019-02-12 DIAGNOSIS — N39.46 MIXED STRESS AND URGE URINARY INCONTINENCE: Primary | ICD-10-CM

## 2019-02-12 PROCEDURE — G0463 HOSPITAL OUTPT CLINIC VISIT: HCPCS

## 2019-02-12 PROCEDURE — 51798 US URINE CAPACITY MEASURE: CPT

## 2019-02-12 RX ORDER — OXYBUTYNIN CHLORIDE 5 MG/1
5 TABLET, EXTENDED RELEASE ORAL DAILY
Qty: 90 TABLET | Refills: 3 | Status: SHIPPED | OUTPATIENT
Start: 2019-02-12 | End: 2019-10-31

## 2019-02-12 ASSESSMENT — MIFFLIN-ST. JEOR: SCORE: 1209.21

## 2019-02-12 ASSESSMENT — PAIN SCALES - GENERAL: PAINLEVEL: MODERATE PAIN (4)

## 2019-02-12 NOTE — PROGRESS NOTES
Urology Consult History and Physical  [place] TELEMEDICINE VISIT  Name: Anya Vicente    MRN: [id number]   YOB: 1950       We were asked to see Anya Vicente at the request of Dr. Danielle for evaluation and treatment of mixed urinary incontinence.        Chief Complaint:   Mixed urinary incontinence    History is obtained from the patient            History of Present Illness:   Anya Vicente is a 68 year old female who is being seen for evaluation of Mixed urinary incontinence    She has had worsening incontinence over the past 2 years.  Long history of stress incontinence and now having worsening urgency and urge incontinence.     Stress incontinence: worsening over the past 1 year. Notes a small amount of urine leakage with coughing, sneezing. Uses 4-5 thin pads daily.   Urgency: has had a few episodes of urge incontinence. Maybe 1-2 times per week. She is very bothered by this.    Main complaint is urgency and urge incontinence.    No UTIs, dysuria, or hematuria.   Had transvaginal hysterectomy in  due to cervical changes.   No noticeable vaginal bulge.     Reports some constipation, but notes BM 1-2 times daily.     - all vaginal deliveries.     H/O ventral hernia repair 1-2 years ago    (4 or >)  Location: Bladder  Quality: Mixed stress and urge incontinence  Severity: 1-2 urge incontinence episodes per week  Duration: 1-2 years           Past Medical History:     Past Medical History:   Diagnosis Date     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 2011     Depression 2011     Esophageal reflux 2011     Fibromyalgia 2005     Insomnia 2014     Irritable bowel syndrome 2011    has mild prolapse last echo      Menopausal or female climacteric states 2007     Mitral valve disorders(424.0) 2011    has mild prolapse last echo      Myalgia and myositis, unspecified 2008     Narcolepsy      Other  affections of shoulder region, not elsewhere 10/01/2008     Other follow-up examination(V67.59) 03/18/2005     Other screening mammogram 09/29/2006     Other specified pre-operative examination 05/04/2005     Posttraumatic stress disorder 02/22/2011     Restless legs syndrome (RLS) 02/22/2011     Sebaceous cyst 08/19/2005     Tourette's disorder 02/22/2011     Ventral hernia 09/15/2011            Past Surgical History:     Past Surgical History:   Procedure Laterality Date     BILATERAL CATARACT EXTRACTION  2006 01/01/2011     Bunion Surgery > RT  2010     COLONOSCOPY  12/23/2013    Procedure: COLONOSCOPY;  COLONOSCOPY;  Surgeon: Kasia Enrique DO;  Location: [facility]     D&C  1991    01/01/2011     deviated septum repair  2012     ECHOCARDIOGRAM  2006 01/01/2011     HEMORRHOIDECTOMY  1979    01/01/2011     HYSTERECTOMY  1995    01/01/2011     LAPAROSCOPIC HERNIORRHAPHY VENTRAL  1/16/2014    Procedure: LAPAROSCOPIC HERNIORRHAPHY VENTRAL;  LAPAROSCOPIC VENTRAL HERNIA REPAIR W/ MESH;  Surgeon: Kasia Enrique DO;  Location: [facility]     leg repair after fx  >LT  2009     RELEASE CARPAL TUNNEL  2010    RT     TRIGGER  THUMB RELEASE  2011            Social History:     Social History     Tobacco Use     Smoking status: Never Smoker     Smokeless tobacco: Never Used   Substance Use Topics     Alcohol use: Yes     Comment: RARELY       History   Smoking Status     Never Smoker   Smokeless Tobacco     Never Used            Family History:     Family History   Problem Relation Age of Onset     Alcohol/Drug Father 42        Motor Vehicle Accident due to Alcoholism - Cause of Death     Diabetes Maternal Uncle      Parkinsonism Maternal Uncle      Depression Mother      Hypertension Mother      Cancer Mother         Cervical     Parkinsonism Mother      Other - See Comments Sister         ADD/ADHD     Other - See Comments Sister         Fibromyalgia     Diabetes Sister      Diabetes Sister      Other - See Comments Sister  "        ADD/ADHD     Other - See Comments Son         ADD/ADHD     Other - See Comments Son         ADD/ADHD              Allergies:     Allergies   Allergen Reactions     Acetaminophen      Darvocet-N 100       Bupropion Other (See Comments)     Increased anxiety     Clonidine      Dropped BP Drastically     Codamine Itching and Other (See Comments)     \"Buzzy\"     Codeine      Hydrocodone      \"Patient states can take liquid med but not pill\".     Meperidine      Oxycodone-Aspirin Itching, Nausea and Vomiting and Other (See Comments)     \"Buzzy\"     Pimozide Other (See Comments)     Drowsiness     Propoxyphene Napsylate      Darvocet-N 100     Seasonal Allergies Other (See Comments)     Seasonal allergies.  Per 7/3/07, runny nose, stuffy, plugged ears.            Medications:     Current Outpatient Medications   Medication Sig     amphetamine-dextroamphetamine (ADDERALL) 20 MG per tablet Take 2 tablets in the morning and 1 tablet at noon     budesonide-formoterol (SYMBICORT) 160-4.5 MCG/ACT Inhaler Inhale 2 puffs into the lungs 2 times daily     Calcium Carbonate-Vit D-Min (CALCIUM 1200 PO) Take 1,200 mg by mouth daily      cyclobenzaprine (FLEXERIL) 5 MG tablet Take 1 tablet (5 mg) by mouth 3 times daily as needed for muscle spasms     hydrochlorothiazide (HYDRODIURIL) 50 MG tablet TAKE 1 TABLET DAILY BY MOUTH     ibuprofen (ADVIL/MOTRIN) 800 MG tablet      medical cannabis (Patient's own supply.  Not a prescription) See Admin Instructions (This is NOT a prescription, and does not certify that the patient has a qualifying medical condition for medical cannabis.  The purpose of this order is  to document that the patient reports taking medical cannabis.)     ORDER FOR DME Equipment being ordered: silver sneakers.     ORDER FOR DME Equipment being ordered: thoracic/lumbrosacral support.     ORDER FOR DME Equipment being ordered: knee sleeve     pramipexole (MIRAPEX) 0.5 MG tablet TAKE 1 TABLET BY MOUTH AT BEDTIME - " "GENERIC FOR MIRAPEX     traMADol (ULTRAM) 50 MG tablet TAKE 1 TO 2 TABLETS BY MOUTH EVERY 6 HOURS AS NEEDED FOR MODERATE P AIN     predniSONE (DELTASONE) 20 MG tablet 2 po qam x 7 d, 1 po q am x 7 d, then stop (Patient not taking: Reported on 2/12/2019.)     No current facility-administered medications for this visit.              Review of Systems:     Skin: negative  Eyes: negative  Ears/Nose/Throat: negative  Respiratory: No shortness of breath, dyspnea on exertion, cough, or hemoptysis  Cardiovascular: negative  Gastrointestinal: negative  Genitourinary: as above  Musculoskeletal: negative  Neurologic: negative  Psychiatric: negative  Hematologic/Lymphatic/Immunologic: negative  Endocrine: negative          Physical Exam:     Patient Vitals for the past 24 hrs:   BP Temp Temp src Pulse Resp SpO2 Height Weight   02/12/19 1028 130/70 97.1  F (36.2  C) Tympanic 91 16 94 % 1.613 m (5' 3.5\") 71.2 kg (157 lb)     Body mass index is 27.38 kg/m .     General: age-appropriate appearing female in NAD  HEENT: Head AT/NC, EOMI, CN Grossly intact  Lungs: no respiratory distress, or pursed lip breathing  Heart: No obvious jugular venous distension present  Skin: no suspicious lesions or rashes  Neuro: Alert, oriented, speech and mentation normal;  moving all 4 extremities equally.  Psych: affect and mood normal          Data:   All laboratory data reviewed:    UA RESULTS:  Recent Labs   Lab Test 12/26/18  1520   COLOR Yellow   APPEARANCE Clear   URINEGLC Negative   URINEBILI Negative   URINEKETONE Negative   SG >1.030   UBLD Negative   URINEPH 5.0   PROTEIN Negative   UROBILINOGEN 0.2   NITRITE Negative   LEUKEST Negative      Lab Results   Component Value Date    CR 0.90 12/26/2018      PVR = 0 ml          Impression and Plan:   Impression:   68 year old female with mixed urinary incontinence.      Plan:   Mixed urinary incontinence - urge predominant  - We discussed that treatment for incontinence is largely driven by " symptoms and the degree of patient bother  - We discussed that given the urge component is her most bothersome, we will start with treatment for this. PVR is 0 so will start with anticholingeric. We discussed the mechanism of action and the common side effects.  - Script for oxybutynin 5mg XL (Ditropan XL) sent to pharmacy  - plan for 1 year follow up  - she will call if not doing well to discuss other options after 2-3 months  - If she is interested in stress incontinence surgery she is willing to come down to the [facility]     Thank you for the kind consultation.    Time spent: 30 minutes of which >50% was spent counseling.    Geovani Roberts MD   Urology  [facility]  [facility] Phone: [phone]  [facility] Phone: [phone]

## 2019-02-12 NOTE — NURSING NOTE
"Chief Complaint   Patient presents with     Consult     Regarding mixed stress incontinence. Per [facility].     PVR-0 MLS  Initial /70   Pulse 91   Temp 97.1  F (36.2  C) (Tympanic)   Resp 16   Ht 1.613 m (5' 3.5\")   Wt 71.2 kg (157 lb)   SpO2 94%   BMI 27.38 kg/m   Estimated body mass index is 27.38 kg/m  as calculated from the following:    Height as of this encounter: 1.613 m (5' 3.5\").    Weight as of this encounter: 71.2 kg (157 lb).  Medication Reconciliation: complete    Katherine Osman LPN    "

## 2019-03-25 DIAGNOSIS — M79.7 FIBROMYALGIA: ICD-10-CM

## 2019-03-25 NOTE — TELEPHONE ENCOUNTER
traMADol (ULTRAM) 50 MG tablet     Last Written Prescription Date:  5/23/18  Last Fill Quantity: 60,   # refills: 0  Last Office Visit: 12/26/18  Future Office visit:       Routing refill request to provider for review/approval because:  Drug not on the [facility], [facility] or [facility] refill protocol or controlled substance

## 2019-03-26 RX ORDER — TRAMADOL HYDROCHLORIDE 50 MG/1
TABLET ORAL
Qty: 60 TABLET | Refills: 0 | Status: SHIPPED | OUTPATIENT
Start: 2019-03-26 | End: 2019-09-20

## 2019-04-16 RX ORDER — DEXTROAMPHETAMINE SACCHARATE, AMPHETAMINE ASPARTATE, DEXTROAMPHETAMINE SULFATE AND AMPHETAMINE SULFATE 5; 5; 5; 5 MG/1; MG/1; MG/1; MG/1
TABLET ORAL
Qty: 90 TABLET | Refills: 0 | Status: CANCELLED | OUTPATIENT
Start: 2019-04-16

## 2019-04-18 ENCOUNTER — OFFICE VISIT (OUTPATIENT)
Dept: AUDIOLOGY | Facility: OTHER | Age: 69
End: 2019-04-18
Attending: AUDIOLOGIST
Payer: MEDICARE

## 2019-04-18 DIAGNOSIS — H90.3 SENSORINEURAL HEARING LOSS, ASYMMETRICAL: Primary | ICD-10-CM

## 2019-04-18 PROCEDURE — 92550 TYMPANOMETRY & REFLEX THRESH: CPT | Performed by: AUDIOLOGIST

## 2019-04-18 PROCEDURE — 92557 COMPREHENSIVE HEARING TEST: CPT | Performed by: AUDIOLOGIST

## 2019-04-18 NOTE — PROGRESS NOTES
Audiology Evaluation Completed. Please refer SCANNED AUDIOGRAM and/or TYMPANOGRAM for BACKGROUND, RESULTS, RECOMMENDATIONS.      Joan IBRAHIM, [facility]  Audiologist #[id number]

## 2019-05-20 NOTE — PROGRESS NOTES
Subjective     Anya Vicente is a 69 year old female who presents to the clinic today for the following health issues:    HPI     Right thumb pain       Duration: x 2 months     Description (location/character/radiation): lateral right thumb described as a stabbing pain     Intensity:  moderate    Accompanying signs and symptoms: none     History (similar episodes/previous evaluation): None, no known injury    Precipitating or alleviating factors: pain worsens when croqueting or doing other crafts    Therapies tried and outcome: ice a couple times a day , wrapping her thumb with minimal relief     She denies edema, erythema, or ecchymosis. No fevers. No numbness or tingling.      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)     Advanced care planning/counseling discussion     Glucose intolerance (impaired glucose tolerance)     Insomnia     Major depressive disorder     Mixed stress and urge urinary incontinence     Leg cramps     Vitamin D deficiency disease     Complete rupture of rotator cuff     Esophageal reflux     Glucose intolerance     Myalgia and myositis     Open bimalleolar fracture     Other malaise and fatigue     Pain in joint, ankle and foot     Past Surgical History:   Procedure Laterality Date     BILATERAL CATARACT EXTRACTION  2006 01/01/2011     Bunion Surgery > RT  2010     COLONOSCOPY  12/23/2013    Procedure: COLONOSCOPY;  COLONOSCOPY;  Surgeon: Kasia Enrique DO;  Location: [facility]     D&C  1991 01/01/2011     deviated septum repair  2012     ECHOCARDIOGRAM  2006 01/01/2011     HEMORRHOIDECTOMY  1979    01/01/2011     HYSTERECTOMY  1995 01/01/2011     LAPAROSCOPIC HERNIORRHAPHY VENTRAL  1/16/2014    Procedure: LAPAROSCOPIC HERNIORRHAPHY VENTRAL;  LAPAROSCOPIC VENTRAL HERNIA REPAIR W/ MESH;  Surgeon: Kasia Enrique DO;  Location: [facility]     leg repair after fx  >LT  2009     RELEASE CARPAL TUNNEL  2010    RT     TRIGGER  THUMB  RELEASE  2011       Social History     Tobacco Use     Smoking status: Never Smoker     Smokeless tobacco: Never Used   Substance Use Topics     Alcohol use: Yes     Comment: RARELY     Family History   Problem Relation Age of Onset     Alcohol/Drug Father 42        Motor Vehicle Accident due to Alcoholism - Cause of Death     Diabetes Maternal Uncle      Parkinsonism Maternal Uncle      Depression Mother      Hypertension Mother      Cancer Mother         Cervical     Parkinsonism Mother      Other - See Comments Sister         ADD/ADHD     Other - See Comments Sister         Fibromyalgia     Diabetes Sister      Diabetes Sister      Other - See Comments Sister         ADD/ADHD     Other - See Comments Son         ADD/ADHD     Other - See Comments Son         ADD/ADHD         Current Outpatient Medications   Medication Sig Dispense Refill     amphetamine-dextroamphetamine (ADDERALL) 20 MG per tablet Take 2 tablets in the morning and 1 tablet at noon (Patient taking differently: 20 mg Take 2 tablets in the morning and 1 tablet at noon) 90 tablet 0     budesonide-formoterol (SYMBICORT) 160-4.5 MCG/ACT Inhaler Inhale 2 puffs into the lungs 2 times daily 1 Inhaler 1     buPROPion (WELLBUTRIN XL) 300 MG 24 hr tablet Take 300 mg by mouth every morning       Calcium Carbonate-Vit D-Min (CALCIUM 1200 PO) Take 1,200 mg by mouth daily        hydrochlorothiazide (HYDRODIURIL) 50 MG tablet TAKE 1 TABLET DAILY BY MOUTH (Patient taking differently: TAKE 1 TABLET DAILY BY MOUTH as needed) 90 tablet 2     ibuprofen (ADVIL/MOTRIN) 800 MG tablet        medical cannabis (Patient's own supply.  Not a prescription) See Admin Instructions (This is NOT a prescription, and does not certify that the patient has a qualifying medical condition for medical cannabis.  The purpose of this order is  to document that the patient reports taking medical cannabis.)       oxybutynin ER (DITROPAN-XL) 5 MG 24 hr tablet Take 1 tablet (5 mg) by mouth  "daily 90 tablet 3     pramipexole (MIRAPEX) 0.5 MG tablet TAKE 1 TABLET BY MOUTH AT BEDTIME - GENERIC FOR MIRAPEX 30 tablet 10     predniSONE (DELTASONE) 20 MG tablet 2 po qam x 7 d, 1 po q am x 7 d, then stop (Patient taking differently: 2 po qam x 7 d, 1 po q am x 7 d, then stop.) 21 tablet 1     traMADol (ULTRAM) 50 MG tablet TAKE 1 TO 2 TABLETS BY MOUTH EVERY 6 HOURS AS NEEDED FOR MODERATE P AIN 60 tablet 0     ORDER FOR DME Equipment being ordered: silver sneakers. 1 Act 0     ORDER FOR DME Equipment being ordered: thoracic/lumbrosacral support. 1 Device 1     ORDER FOR DME Equipment being ordered: knee sleeve 1 Device 6     Allergies   Allergen Reactions     Acetaminophen      Darvocet-N 100       Bupropion Other (See Comments)     Increased anxiety     Clonidine      Dropped BP Drastically     Codamine Itching and Other (See Comments)     \"Buzzy\"     Codeine      Hydrocodone      \"Patient states can take liquid med but not pill\".     Meperidine      Oxycodone-Aspirin Itching, Nausea and Vomiting and Other (See Comments)     \"Buzzy\"     Pimozide Other (See Comments)     Drowsiness     Propoxyphene Napsylate      Darvocet-N 100     Seasonal Allergies Other (See Comments)     Seasonal allergies.  Per 7/3/07, runny nose, stuffy, plugged ears.       Reviewed and updated as needed this visit by Provider         Review of Systems   As noted in the HPI      Objective    /72 (BP Location: Left arm, Patient Position: Chair, Cuff Size: Adult Regular)   Pulse 110   SpO2 98%   There is no height or weight on file to calculate BMI.  Physical Exam   GENERAL: healthy, alert and no distress  Right Thumb: Skin intact. No edema, erythema, or ecchymosis. Pain with palpation over MCP joint and IP joint. Patient with flexion and extension of these joints.     Diagnostic Test Results:  Xray -   Impression     IMPRESSION: Degenerative changes at the interphalangeal joint of the  thumb            Assessment & Plan   (M79.644) " Thumb pain, right  (primary encounter diagnosis)  (M18.11) Osteoarthritis of right thumb  Plan: ICE and NSAIDs were encouraged. Will also refer to ortho for possible injection. Patient would like to be seen at [facility].         Sindi Hicks NP  [facility]

## 2019-05-21 ENCOUNTER — ANCILLARY PROCEDURE (OUTPATIENT)
Dept: GENERAL RADIOLOGY | Facility: OTHER | Age: 69
End: 2019-05-21
Attending: NURSE PRACTITIONER
Payer: MEDICARE

## 2019-05-21 ENCOUNTER — OFFICE VISIT (OUTPATIENT)
Dept: FAMILY MEDICINE | Facility: OTHER | Age: 69
End: 2019-05-21
Attending: NURSE PRACTITIONER
Payer: MEDICARE

## 2019-05-21 VITALS — OXYGEN SATURATION: 98 % | SYSTOLIC BLOOD PRESSURE: 120 MMHG | DIASTOLIC BLOOD PRESSURE: 72 MMHG | HEART RATE: 110 BPM

## 2019-05-21 DIAGNOSIS — M18.11 OSTEOARTHRITIS OF RIGHT THUMB: ICD-10-CM

## 2019-05-21 DIAGNOSIS — M79.644 THUMB PAIN, RIGHT: Primary | ICD-10-CM

## 2019-05-21 PROCEDURE — 73140 X-RAY EXAM OF FINGER(S): CPT | Mod: TC,RT

## 2019-05-21 PROCEDURE — G0463 HOSPITAL OUTPT CLINIC VISIT: HCPCS

## 2019-05-21 PROCEDURE — 99213 OFFICE O/P EST LOW 20 MIN: CPT | Performed by: NURSE PRACTITIONER

## 2019-05-21 RX ORDER — BUPROPION HYDROCHLORIDE 300 MG/1
300 TABLET ORAL EVERY MORNING
COMMUNITY
End: 2019-10-31

## 2019-05-21 ASSESSMENT — ANXIETY QUESTIONNAIRES
IF YOU CHECKED OFF ANY PROBLEMS ON THIS QUESTIONNAIRE, HOW DIFFICULT HAVE THESE PROBLEMS MADE IT FOR YOU TO DO YOUR WORK, TAKE CARE OF THINGS AT HOME, OR GET ALONG WITH OTHER PEOPLE: SOMEWHAT DIFFICULT
3. WORRYING TOO MUCH ABOUT DIFFERENT THINGS: MORE THAN HALF THE DAYS
6. BECOMING EASILY ANNOYED OR IRRITABLE: MORE THAN HALF THE DAYS
7. FEELING AFRAID AS IF SOMETHING AWFUL MIGHT HAPPEN: MORE THAN HALF THE DAYS
1. FEELING NERVOUS, ANXIOUS, OR ON EDGE: NOT AT ALL
GAD7 TOTAL SCORE: 9
2. NOT BEING ABLE TO STOP OR CONTROL WORRYING: MORE THAN HALF THE DAYS
5. BEING SO RESTLESS THAT IT IS HARD TO SIT STILL: NOT AT ALL

## 2019-05-21 ASSESSMENT — PAIN SCALES - GENERAL: PAINLEVEL: MODERATE PAIN (4)

## 2019-05-21 ASSESSMENT — PATIENT HEALTH QUESTIONNAIRE - PHQ9
5. POOR APPETITE OR OVEREATING: SEVERAL DAYS
SUM OF ALL RESPONSES TO PHQ QUESTIONS 1-9: 11

## 2019-05-21 NOTE — NURSING NOTE
"Chief Complaint   Patient presents with     Thumb Discomfort     Right thumb        Initial /72 (BP Location: Left arm, Patient Position: Chair, Cuff Size: Adult Regular)   Pulse 110   SpO2 98%  Estimated body mass index is 27.38 kg/m  as calculated from the following:    Height as of 2/12/19: 1.613 m (5' 3.5\").    Weight as of 2/12/19: 71.2 kg (157 lb).  Medication Reconciliation: complete    Amanda Gonzalez LPN  "

## 2019-05-22 ASSESSMENT — ASTHMA QUESTIONNAIRES: ACT_TOTALSCORE: 17

## 2019-05-22 ASSESSMENT — ANXIETY QUESTIONNAIRES: GAD7 TOTAL SCORE: 9

## 2019-05-31 DIAGNOSIS — G25.81 RESTLESS LEGS SYNDROME (RLS): ICD-10-CM

## 2019-05-31 NOTE — TELEPHONE ENCOUNTER
Flexeril      Last Written Prescription Date:  12/28/18  Last Fill Quantity: 30,   # refills: 3  Last Office Visit: 5/21/19  Future Office visit:       Routing refill request to provider for review/approval because:  Drug not on the [facility], [facility] or [facility] refill protocol or controlled substance

## 2019-06-03 RX ORDER — CYCLOBENZAPRINE HCL 5 MG
5 TABLET ORAL 3 TIMES DAILY PRN
Qty: 30 TABLET | Refills: 3 | Status: SHIPPED | OUTPATIENT
Start: 2019-06-03 | End: 2019-10-31

## 2019-06-06 ENCOUNTER — TRANSFERRED RECORDS (OUTPATIENT)
Dept: HEALTH INFORMATION MANAGEMENT | Facility: CLINIC | Age: 69
End: 2019-06-06

## 2019-06-17 ENCOUNTER — TRANSFERRED RECORDS (OUTPATIENT)
Dept: HEALTH INFORMATION MANAGEMENT | Facility: CLINIC | Age: 69
End: 2019-06-17

## 2019-08-12 DIAGNOSIS — F33.1 MODERATE EPISODE OF RECURRENT MAJOR DEPRESSIVE DISORDER (H): ICD-10-CM

## 2019-08-13 NOTE — TELEPHONE ENCOUNTER
buPROPion (WELLBUTRIN XL) 300 MG 24 hr tablet      Last Written Prescription Date:  historical  Last Fill Quantity: -,   # refills: -  Last Office Visit: 12/26/18  Future Office visit:       Routing refill request to provider for review/approval because:  Medication is reported/historical

## 2019-08-14 RX ORDER — BUPROPION HYDROCHLORIDE 300 MG/1
TABLET ORAL
Qty: 30 TABLET | Refills: 0 | Status: SHIPPED | OUTPATIENT
Start: 2019-08-14 | End: 2019-09-14

## 2019-09-14 DIAGNOSIS — F33.1 MODERATE EPISODE OF RECURRENT MAJOR DEPRESSIVE DISORDER (H): ICD-10-CM

## 2019-09-16 RX ORDER — BUPROPION HYDROCHLORIDE 300 MG/1
TABLET ORAL
Qty: 30 TABLET | Refills: 1 | Status: SHIPPED | OUTPATIENT
Start: 2019-09-16 | End: 2019-10-31

## 2019-09-20 DIAGNOSIS — M79.7 FIBROMYALGIA: ICD-10-CM

## 2019-09-20 DIAGNOSIS — G25.81 RESTLESS LEGS SYNDROME (RLS): Primary | ICD-10-CM

## 2019-09-20 RX ORDER — TRAMADOL HYDROCHLORIDE 50 MG/1
TABLET ORAL
Qty: 60 TABLET | Refills: 0 | Status: SHIPPED | OUTPATIENT
Start: 2019-09-20 | End: 2019-10-31

## 2019-09-20 RX ORDER — ZOLPIDEM TARTRATE 5 MG/1
5 TABLET ORAL
Qty: 30 TABLET | Refills: 0 | Status: SHIPPED | OUTPATIENT
Start: 2019-09-20 | End: 2019-10-31

## 2019-09-20 NOTE — TELEPHONE ENCOUNTER
Ambien      Last Written Prescription Date:  Patient request  Last Fill Quantity: 30,   # refills: 0  Last Office Visit: 5/21/2019  Future Office visit:       Routing refill request to provider for review/approval because:  Drug not active on patient's medication list

## 2019-09-20 NOTE — TELEPHONE ENCOUNTER
Ultram       Last Written Prescription Date:  3/26/2019  Last Fill Quantity: 60,   # refills: 0  Last Office Visit: 5/21/2019  Future Office visit:       Routing refill request to provider for review/approval because:  Drug not on the [facility], [facility] or [facility] refill protocol or controlled substance

## 2019-10-07 DIAGNOSIS — G25.81 RESTLESS LEGS SYNDROME: ICD-10-CM

## 2019-10-08 RX ORDER — PRAMIPEXOLE DIHYDROCHLORIDE 0.5 MG/1
TABLET ORAL
Qty: 30 TABLET | Refills: 0 | Status: SHIPPED | OUTPATIENT
Start: 2019-10-08 | End: 2019-10-31

## 2019-10-30 NOTE — PATIENT INSTRUCTIONS
Patient Education   Personalized Prevention Plan  You are due for the preventive services outlined below.  Your care team is available to assist you in scheduling these services.  If you have already completed any of these items, please share that information with your care team to update in your medical record.  Health Maintenance Due   Topic Date Due     Hepatitis C Screening  1950     Depression Action Plan  1950     Annual Wellness Visit  05/09/2013     Zoster (Shingles) Vaccine (2 of 3) 07/17/2014     Discuss Advance Care Planning  10/10/2017     Mammogram  01/16/2019     Flu Vaccine (1) 09/01/2019     Asthma Control Test  11/21/2019     Depression Assessment  11/21/2019

## 2019-10-30 NOTE — PROGRESS NOTES
"  SUBJECTIVE:   Anya Vicente is a 69 year old female who presents for Preventive Visit.      Are you in the first 12 months of your Medicare Part B coverage?  No    Physical Health:    In general, how would you rate your overall physical health? good    Outside of work, how many days during the week do you exercise? 2-3 days/week    Outside of work, approximately how many minutes a day do you exercise?30-45 minutes    If you drink alcohol do you typically have >3 drinks per day or >7 drinks per week? No    Do you usually eat at least 4 servings of fruit and vegetables a day, include whole grains & fiber and avoid regularly eating high fat or \"junk\" foods? Yes    Do you have any problems taking medications regularly?  No    Do you have any side effects from medications? none    Needs assistance for the following daily activities: no assistance needed    Which of the following safety concerns are present in your home?  none identified     Hearing impairment: Yes, Need to ask people to speak up or repeat themselves.    Difficulty understanding soft or whispered speech.    Difficulty understanding speech on the telephone.    In the past 6 months, have you been bothered by leaking of urine? yes    Mental Health:    In general, how would you rate your overall mental or emotional health? good  PHQ-2 Score:      Do you feel safe in your environment? Yes    Have you ever done Advance Care Planning? (For example, a Health Directive, POLST, or a discussion with a medical provider about your wishes): Yes, patient states has an Advance Care Planning document and will bring a copy to the clinic.    Additional concerns to address?  No    Fall risk:  Fallen 2 or more times in the past year?: No  Any fall with injury in the past year?: No    Cognitive Screenin) Repeat 3 items (Leader, Season, Table)    2) Clock draw: NORMAL  3) 3 item recall: Recalls 2 objects   Results: NORMAL clock, 1-2 items recalled: COGNITIVE " IMPAIRMENT LESS LIKELY    Mini-CogTM Copyright [employer]. Licensed by the author for use in [facility]; reprinted with permission (beny@.[facility]). All rights reserved.      Do you have sleep apnea, excessive snoring or daytime drowsiness?: excessive snoring             Reviewed and updated as needed this visit by clinical staff  Tobacco  Allergies  Meds  Med Hx  Surg Hx  Fam Hx  Soc Hx        Reviewed and updated as needed this visit by Provider        Social History     Tobacco Use     Smoking status: Never Smoker     Smokeless tobacco: Never Used   Substance Use Topics     Alcohol use: Yes     Comment: RARELY                           Current providers sharing in care for this patient include:   Patient Care Team:  Cindy Danielle PA as PCP - General  Cindy Danielle PA as Assigned PCP    The following health maintenance items are reviewed in Epic and correct as of today:  Health Maintenance   Topic Date Due     HEPATITIS C SCREENING  1950     ZOSTER IMMUNIZATION (2 of 3) 07/17/2014     MAMMO SCREENING  01/16/2019     PHQ-9  11/21/2019     DTAP/TDAP/TD IMMUNIZATION (2 - Td) 04/08/2020     ASTHMA CONTROL TEST  04/30/2020     VALERIA ASSESSMENT  05/21/2020     FALL RISK ASSESSMENT  05/21/2020     MEDICARE ANNUAL WELLNESS VISIT  10/31/2020     ASTHMA ACTION PLAN  10/31/2020     LIPID  07/31/2023     COLONOSCOPY  12/23/2023     ADVANCE CARE PLANNING  10/31/2024     DEXA  Completed     DEPRESSION ACTION PLAN  Completed     PNEUMOCOCCAL IMMUNIZATION 65+ LOW/MEDIUM RISK  Completed     INFLUENZA VACCINE  Addressed     IPV IMMUNIZATION  Aged Out     MENINGITIS IMMUNIZATION  Aged Out     Lab work is in process  Labs reviewed in EPIC  BP Readings from Last 3 Encounters:   10/31/19 (!) 142/70   05/21/19 120/72   02/12/19 130/70    Wt Readings from Last 3 Encounters:   10/31/19 71.2 kg (157 lb)   02/12/19 71.2 kg (157 lb)   12/26/18 71.2 kg (157 lb)                  Patient Active Problem List    Diagnosis     Narcolepsy without 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)     Advanced care planning/counseling discussion     Glucose intolerance (impaired glucose tolerance)     Insomnia     Major depressive disorder     Mixed stress and urge urinary incontinence     Leg cramps     Vitamin D deficiency disease     Complete rupture of rotator cuff     Esophageal reflux     Glucose intolerance     Myalgia and myositis     Open bimalleolar fracture     Other malaise and fatigue     Pain in joint, ankle and foot     Past Surgical History:   Procedure Laterality Date     BILATERAL CATARACT EXTRACTION  2006 01/01/2011     Bunion Surgery > RT  2010     COLONOSCOPY  12/23/2013    Procedure: COLONOSCOPY;  COLONOSCOPY;  Surgeon: Kasia Enrique DO;  Location: [facility] OR     D&C  1991 01/01/2011     deviated septum repair  2012     ECHOCARDIOGRAM  2006 01/01/2011     HEMORRHOIDECTOMY  1979    01/01/2011     HYSTERECTOMY  1995 01/01/2011     LAPAROSCOPIC HERNIORRHAPHY VENTRAL  1/16/2014    Procedure: LAPAROSCOPIC HERNIORRHAPHY VENTRAL;  LAPAROSCOPIC VENTRAL HERNIA REPAIR W/ MESH;  Surgeon: Kasia Enrique DO;  Location: [facility] OR     leg repair after fx  >LT  2009     RELEASE CARPAL TUNNEL  2010    RT     TRIGGER  THUMB RELEASE  2011       Social History     Tobacco Use     Smoking status: Never Smoker     Smokeless tobacco: Never Used   Substance Use Topics     Alcohol use: Yes     Comment: RARELY     Family History   Problem Relation Age of Onset     Alcohol/Drug Father 42        Motor Vehicle Accident due to Alcoholism - Cause of Death     Diabetes Maternal Uncle      Parkinsonism Maternal Uncle      Depression Mother      Hypertension Mother      Cancer Mother         Cervical     Parkinsonism Mother      Other - See Comments Sister         ADD/ADHD     Other - See Comments Sister         Fibromyalgia     Diabetes Sister      Diabetes Sister      Other - See Comments Sister         ADD/ADHD     Other - See Comments Son         ADD/ADHD      Other - See Comments Son         ADD/ADHD         Current Outpatient Medications   Medication Sig Dispense Refill     amphetamine-dextroamphetamine (ADDERALL) 20 MG per tablet Take 2 tablets in the morning and 1 tablet at noon (Patient taking differently: 20 mg Take 2 tablets in the morning and 1 tablet at noon) 90 tablet 0     buPROPion (WELLBUTRIN XL) 300 MG 24 hr tablet Take 1 tablet (300 mg) by mouth every morning 90 tablet 3     Calcium Carbonate-Vit D-Min (CALCIUM 1200 PO) Take 1,200 mg by mouth daily        cyclobenzaprine (FLEXERIL) 5 MG tablet Take 1 tablet (5 mg) by mouth 3 times daily as needed for muscle spasms 90 tablet 3     docusate sodium (COLACE) 100 MG capsule Take 100 mg by mouth daily Take 4 capsules daily       hydrochlorothiazide (HYDRODIURIL) 50 MG tablet TAKE 1 TABLET DAILY BY MOUTH as needed 90 tablet 2     ibuprofen (ADVIL/MOTRIN) 800 MG tablet Take 1 tablet (800 mg) by mouth every 6 hours as needed for moderate pain 270 tablet 3     ORDER FOR DME Equipment being ordered: silver sneakers. 1 Act 0     ORDER FOR DME Equipment being ordered: thoracic/lumbrosacral support. 1 Device 1     ORDER FOR DME Equipment being ordered: knee sleeve 1 Device 6     oxybutynin ER (DITROPAN-XL) 5 MG 24 hr tablet Take 1 tablet (5 mg) by mouth daily 90 tablet 3     pramipexole (MIRAPEX) 0.5 MG tablet TAKE 1 TABLET BY MOUTH AT BEDTIME - GENERIC FOR MIRAPEX 90 tablet 3     predniSONE (DELTASONE) 20 MG tablet 2 po qam x 7 d, 1 po q am x 7 d, then stop (Patient taking differently: 2 po qam x 7 d, 1 po q am x 7 d, then stop.) 21 tablet 1     traMADol (ULTRAM) 50 MG tablet TAKE 1 TO 2 TABLETS BY MOUTH EVERY 6 HOURS AS NEEDED FOR MODERATE P AIN 60 tablet 0     TURMERIC CURCUMIN PO Take 500 mg by mouth daily       zolpidem (AMBIEN) 5 MG tablet Take 1 tablet (5 mg) by mouth nightly as needed for sleep 30 tablet 3     Allergies   Allergen Reactions     Acetaminophen      Darvocet-N 100       Bupropion Other (See  "Comments)     Increased anxiety     Clonidine      Dropped BP Drastically     Codamine Itching and Other (See Comments)     \"Buzzy\"     Codeine      Hydrocodone      \"Patient states can take liquid med but not pill\".     Meperidine      Oxycodone-Aspirin Itching, Nausea and Vomiting and Other (See Comments)     \"Buzzy\"     Pimozide Other (See Comments)     Drowsiness     Propoxyphene Napsylate      Darvocet-N 100     Seasonal Allergies Other (See Comments)     Seasonal allergies.  Per 7/3/07, runny nose, stuffy, plugged ears.     Recent Labs   Lab Test 12/26/18  1507 07/31/18  0914 12/07/17  1145 02/08/16  1213 02/04/16  1126   A1C 5.9* 5.8*  --  5.5  --    LDL  --  91  --   --   --    HDL  --  85  --   --   --    TRIG  --  51  --   --   --    ALT 22  --  23  --  23   CR 0.90 0.81 0.81  --  0.78   GFRESTIMATED 64 70 70  --  74   GFRESTBLACK 74 85 84  --  89   POTASSIUM 3.9 4.0 4.1  --  4.0   TSH 1.25 1.67  --   --   --       Mammogram Screening: Mammogram Screening: Patient over age 50, mutual decision to screen reflected in health maintenance.    ROS:  CONSTITUTIONAL: NEGATIVE for fever, chills, change in weight  INTEGUMENTARY/SKIN: NEGATIVE for worrisome rashes, moles or lesions  EYES: NEGATIVE for vision changes or irritation  ENT/MOUTH: NEGATIVE for ear, mouth and throat problems  RESP: NEGATIVE for significant cough or SOB  BREAST: NEGATIVE for masses, tenderness or discharge  CV: NEGATIVE for chest pain, palpitations or peripheral edema  GI: NEGATIVE for nausea, abdominal pain, heartburn, or change in bowel habits  : NEGATIVE for frequency, dysuria, or hematuria  MUSCULOSKELETAL: NEGATIVE for significant arthralgias or myalgia  MUSCULOSKELETAL:has plantar fascitis helped by muscle relaxer.   NEURO: NEGATIVE for weakness, dizziness or paresthesias  ENDOCRINE: NEGATIVE for temperature intolerance, skin/hair changes  HEME: NEGATIVE for bleeding problems  PSYCHIATRIC: NEGATIVE for changes in mood or " "affect    OBJECTIVE:   BP (!) 142/70 (Patient Position: Sitting)   Pulse 86   Ht 1.6 m (5' 3\")   Wt 71.2 kg (157 lb)   SpO2 98%   BMI 27.81 kg/m   Estimated body mass index is 27.81 kg/m  as calculated from the following:    Height as of this encounter: 1.6 m (5' 3\").    Weight as of this encounter: 71.2 kg (157 lb).  EXAM:   GENERAL APPEARANCE: healthy, alert and no distress  EYES: Eyes grossly normal to inspection, PERRL and conjunctivae and sclerae normal  HENT: ear canals and TM's normal, nose and mouth without ulcers or lesions, oropharynx clear and oral mucous membranes moist  NECK: no adenopathy, no asymmetry, masses, or scars and thyroid normal to palpation  RESP: lungs clear to auscultation - no rales, rhonchi or wheezes  BREAST: normal without masses, tenderness or nipple discharge and no palpable axillary masses or adenopathy  CV: regular rate and rhythm, normal S1 S2, no S3 or S4, no murmur, click or rub, no peripheral edema and peripheral pulses strong  ABDOMEN: soft, nontender, no hepatosplenomegaly, no masses and bowel sounds normal  MS: no musculoskeletal defects are noted and gait is age appropriate without ataxia  SKIN: no suspicious lesions or rashes  NEURO: Normal strength and tone, sensory exam grossly normal, mentation intact and speech normal  PSYCH: mentation appears normal and affect normal/bright    Diagnostic Test Results:  Labs reviewed in Epic  No results found for this or any previous visit (from the past 24 hour(s)).  Results for orders placed or performed in visit on 10/31/19   Hemoglobin A1c     Status: Abnormal   Result Value Ref Range    Hemoglobin A1C 6.0 (H) 0 - 5.6 %   Estimated Average Glucose     Status: None   Result Value Ref Range    Estimated Average Glucose 126 mg/dL   Results for orders placed or performed in visit on 10/31/19   Comprehensive metabolic panel     Status: Abnormal   Result Value Ref Range    Sodium 139 133 - 144 mmol/L    Potassium 4.4 3.4 - 5.3 " mmol/L    Chloride 106 94 - 109 mmol/L    Carbon Dioxide 30 20 - 32 mmol/L    Anion Gap 3 3 - 14 mmol/L    Glucose 114 (H) 70 - 99 mg/dL    Urea Nitrogen 23 7 - 30 mg/dL    Creatinine 0.80 0.52 - 1.04 mg/dL    GFR Estimate 74 >60 mL/min/[1.73_m2]    GFR Estimate If Black 86 >60 mL/min/[1.73_m2]    Calcium 9.2 8.5 - 10.1 mg/dL    Bilirubin Total 0.3 0.2 - 1.3 mg/dL    Albumin 3.7 3.4 - 5.0 g/dL    Protein Total 7.4 6.8 - 8.8 g/dL    Alkaline Phosphatase 80 40 - 150 U/L    ALT 18 0 - 50 U/L    AST 11 0 - 45 U/L   CBC with platelets     Status: None   Result Value Ref Range    WBC 6.9 4.0 - 11.0 10e9/L    RBC Count 4.44 3.8 - 5.2 10e12/L    Hemoglobin 13.9 11.7 - 15.7 g/dL    Hematocrit 41.1 35.0 - 47.0 %    MCV 93 78 - 100 fl    MCH 31.3 26.5 - 33.0 pg    MCHC 33.8 31.5 - 36.5 g/dL    RDW 12.3 10.0 - 15.0 %    Platelet Count 346 150 - 450 10e9/L       ASSESSMENT / PLAN:   1. Encounter for Medicare annual wellness exam  She is due for wellness not due for pap and her breast exam is negative. Mammogram recommended.  Labs are done and immunizations are reviewed.      2. Mixed stress and urge urinary incontinence  She is going to be given below. Has helped in the past.  No issues with recurrent infection. Mild dry mouth.   - oxybutynin ER (DITROPAN-XL) 5 MG 24 hr tablet; Take 1 tablet (5 mg) by mouth daily  Dispense: 90 tablet; Refill: 3    3. Restless legs syndrome (RLS)  Has tired all other medications. Sleep is best on Ambien.   - zolpidem (AMBIEN) 5 MG tablet; Take 1 tablet (5 mg) by mouth nightly as needed for sleep  Dispense: 30 tablet; Refill: 3    4. Fibromyalgia  Refill for spasm. Using PRN.  Limited tramadol used. Not more than a few pills a week.   - cyclobenzaprine (FLEXERIL) 5 MG tablet; Take 1 tablet (5 mg) by mouth 3 times daily as needed for muscle spasms  Dispense: 90 tablet; Refill: 3  - traMADol (ULTRAM) 50 MG tablet; TAKE 1 TO 2 TABLETS BY MOUTH EVERY 6 HOURS AS NEEDED FOR MODERATE P AIN  Dispense: 60  "tablet; Refill: 0  - ibuprofen (ADVIL/MOTRIN) 800 MG tablet; Take 1 tablet (800 mg) by mouth every 6 hours as needed for moderate pain  Dispense: 270 tablet; Refill: 3    5. Diagnosis unknown  Has hypertension. Mild well controlled on hctz  - hydrochlorothiazide (HYDRODIURIL) 50 MG tablet; TAKE 1 TABLET DAILY BY MOUTH as needed  Dispense: 90 tablet; Refill: 2    6. Restless legs syndrome  Given below. Combined with Ambien.   - pramipexole (MIRAPEX) 0.5 MG tablet; TAKE 1 TABLET BY MOUTH AT BEDTIME - GENERIC FOR MIRAPEX  Dispense: 90 tablet; Refill: 3    7. Visit for screening mammogram  Due for this. Breast exam was negative.   - MA SCREENING DIGITAL BILATERAL ([place]); Future    8. Moderate episode of recurrent major depressive disorder (H)  She is doing well. PHQ and VALERIA are negative. Is retired and adjusting well.   - buPROPion (WELLBUTRIN XL) 300 MG 24 hr tablet; Take 1 tablet (300 mg) by mouth every morning  Dispense: 90 tablet; Refill: 3    COUNSELING:  Reviewed preventive health counseling, as reflected in patient instructions       Regular exercise       Healthy diet/nutrition       Vision screening       Bladder control       Fall risk prevention       Immunizations    Up to date.              Advanced Planning     Estimated body mass index is 27.81 kg/m  as calculated from the following:    Height as of this encounter: 1.6 m (5' 3\").    Weight as of this encounter: 71.2 kg (157 lb).    Weight management plan: Discussed healthy diet and exercise guidelines     reports that she has never smoked. She has never used smokeless tobacco.      Appropriate preventive services were discussed with this patient, including applicable screening as appropriate for cardiovascular disease, diabetes, osteopenia/osteoporosis, and glaucoma.  As appropriate for age/gender, discussed screening for colorectal cancer, prostate cancer, breast cancer, and cervical cancer. Checklist reviewing preventive services available has been " given to the patient.    Reviewed patients plan of care and provided an AVS. The Intermediate Care Plan ( asthma action plan, low back pain action plan, and migraine action plan) for Anya meets the Care Plan requirement. This Care Plan has been established and reviewed with the Patient.    Counseling Resources:  ATP IV Guidelines  Pooled Cohorts Equation Calculator  Breast Cancer Risk Calculator  FRAX Risk Assessment  ICSI Preventive Guidelines  Dietary Guidelines for Americans, 2010  USDA's MyPlate  ASA Prophylaxis  Lung CA Screening    RADHAMES Bowman  [facility] VIDES

## 2019-10-31 ENCOUNTER — OFFICE VISIT (OUTPATIENT)
Dept: FAMILY MEDICINE | Facility: OTHER | Age: 69
End: 2019-10-31
Attending: PHYSICIAN ASSISTANT
Payer: MEDICARE

## 2019-10-31 VITALS
WEIGHT: 157 LBS | OXYGEN SATURATION: 98 % | BODY MASS INDEX: 27.82 KG/M2 | DIASTOLIC BLOOD PRESSURE: 70 MMHG | SYSTOLIC BLOOD PRESSURE: 142 MMHG | HEIGHT: 63 IN | HEART RATE: 86 BPM

## 2019-10-31 DIAGNOSIS — I10 ESSENTIAL HYPERTENSION: ICD-10-CM

## 2019-10-31 DIAGNOSIS — M79.7 FIBROMYALGIA: ICD-10-CM

## 2019-10-31 DIAGNOSIS — G25.81 RESTLESS LEGS SYNDROME: ICD-10-CM

## 2019-10-31 DIAGNOSIS — N39.46 MIXED STRESS AND URGE URINARY INCONTINENCE: ICD-10-CM

## 2019-10-31 DIAGNOSIS — G25.81 RESTLESS LEGS SYNDROME (RLS): ICD-10-CM

## 2019-10-31 DIAGNOSIS — Z00.00 ENCOUNTER FOR MEDICARE ANNUAL WELLNESS EXAM: Primary | ICD-10-CM

## 2019-10-31 DIAGNOSIS — F33.1 MODERATE EPISODE OF RECURRENT MAJOR DEPRESSIVE DISORDER (H): ICD-10-CM

## 2019-10-31 DIAGNOSIS — Z12.31 VISIT FOR SCREENING MAMMOGRAM: ICD-10-CM

## 2019-10-31 DIAGNOSIS — R73.09 ELEVATED GLUCOSE: Primary | ICD-10-CM

## 2019-10-31 LAB
ALBUMIN SERPL-MCNC: 3.7 G/DL (ref 3.4–5)
ALP SERPL-CCNC: 80 U/L (ref 40–150)
ALT SERPL W P-5'-P-CCNC: 18 U/L (ref 0–50)
ANION GAP SERPL CALCULATED.3IONS-SCNC: 3 MMOL/L (ref 3–14)
AST SERPL W P-5'-P-CCNC: 11 U/L (ref 0–45)
BILIRUB SERPL-MCNC: 0.3 MG/DL (ref 0.2–1.3)
BUN SERPL-MCNC: 23 MG/DL (ref 7–30)
CALCIUM SERPL-MCNC: 9.2 MG/DL (ref 8.5–10.1)
CHLORIDE SERPL-SCNC: 106 MMOL/L (ref 94–109)
CO2 SERPL-SCNC: 30 MMOL/L (ref 20–32)
CREAT SERPL-MCNC: 0.8 MG/DL (ref 0.52–1.04)
ERYTHROCYTE [DISTWIDTH] IN BLOOD BY AUTOMATED COUNT: 12.3 % (ref 10–15)
EST. AVERAGE GLUCOSE BLD GHB EST-MCNC: 126 MG/DL
GFR SERPL CREATININE-BSD FRML MDRD: 74 ML/MIN/{1.73_M2}
GLUCOSE SERPL-MCNC: 114 MG/DL (ref 70–99)
HBA1C MFR BLD: 6 % (ref 0–5.6)
HCT VFR BLD AUTO: 41.1 % (ref 35–47)
HGB BLD-MCNC: 13.9 G/DL (ref 11.7–15.7)
MCH RBC QN AUTO: 31.3 PG (ref 26.5–33)
MCHC RBC AUTO-ENTMCNC: 33.8 G/DL (ref 31.5–36.5)
MCV RBC AUTO: 93 FL (ref 78–100)
PLATELET # BLD AUTO: 346 10E9/L (ref 150–450)
POTASSIUM SERPL-SCNC: 4.4 MMOL/L (ref 3.4–5.3)
PROT SERPL-MCNC: 7.4 G/DL (ref 6.8–8.8)
RBC # BLD AUTO: 4.44 10E12/L (ref 3.8–5.2)
SODIUM SERPL-SCNC: 139 MMOL/L (ref 133–144)
WBC # BLD AUTO: 6.9 10E9/L (ref 4–11)

## 2019-10-31 PROCEDURE — G0463 HOSPITAL OUTPT CLINIC VISIT: HCPCS

## 2019-10-31 PROCEDURE — 83036 HEMOGLOBIN GLYCOSYLATED A1C: CPT | Performed by: PHYSICIAN ASSISTANT

## 2019-10-31 PROCEDURE — 40000788 ZZHCL STATISTIC ESTIMATED AVERAGE GLUCOSE: Performed by: PHYSICIAN ASSISTANT

## 2019-10-31 PROCEDURE — 36415 COLL VENOUS BLD VENIPUNCTURE: CPT | Mod: ZL | Performed by: PHYSICIAN ASSISTANT

## 2019-10-31 PROCEDURE — G0439 PPPS, SUBSEQ VISIT: HCPCS | Performed by: PHYSICIAN ASSISTANT

## 2019-10-31 PROCEDURE — 80053 COMPREHEN METABOLIC PANEL: CPT | Mod: ZL | Performed by: PHYSICIAN ASSISTANT

## 2019-10-31 PROCEDURE — 85027 COMPLETE CBC AUTOMATED: CPT | Mod: ZL | Performed by: PHYSICIAN ASSISTANT

## 2019-10-31 RX ORDER — BUPROPION HYDROCHLORIDE 300 MG/1
300 TABLET ORAL EVERY MORNING
Qty: 90 TABLET | Refills: 3 | Status: SHIPPED | OUTPATIENT
Start: 2019-10-31 | End: 2021-07-21

## 2019-10-31 RX ORDER — ZOLPIDEM TARTRATE 5 MG/1
5 TABLET ORAL
Qty: 30 TABLET | Refills: 3 | Status: SHIPPED | OUTPATIENT
Start: 2019-10-31 | End: 2020-05-04

## 2019-10-31 RX ORDER — TRAMADOL HYDROCHLORIDE 50 MG/1
TABLET ORAL
Qty: 60 TABLET | Refills: 0 | Status: SHIPPED | OUTPATIENT
Start: 2019-10-31 | End: 2020-09-22

## 2019-10-31 RX ORDER — PRAMIPEXOLE DIHYDROCHLORIDE 0.5 MG/1
TABLET ORAL
Qty: 90 TABLET | Refills: 3 | Status: SHIPPED | OUTPATIENT
Start: 2019-10-31 | End: 2023-04-26

## 2019-10-31 RX ORDER — OXYBUTYNIN CHLORIDE 5 MG/1
5 TABLET, EXTENDED RELEASE ORAL DAILY
Qty: 90 TABLET | Refills: 3 | Status: SHIPPED | OUTPATIENT
Start: 2019-10-31 | End: 2020-06-02

## 2019-10-31 RX ORDER — HYDROCHLOROTHIAZIDE 50 MG/1
TABLET ORAL
Qty: 90 TABLET | Refills: 2 | Status: SHIPPED | OUTPATIENT
Start: 2019-10-31 | End: 2020-04-06

## 2019-10-31 RX ORDER — CYCLOBENZAPRINE HCL 5 MG
5 TABLET ORAL 3 TIMES DAILY PRN
Qty: 90 TABLET | Refills: 3 | Status: SHIPPED | OUTPATIENT
Start: 2019-10-31 | End: 2021-01-29

## 2019-10-31 RX ORDER — CYCLOBENZAPRINE HCL 5 MG
5 TABLET ORAL
Refills: 3 | COMMUNITY
Start: 2019-10-27 | End: 2019-10-31

## 2019-10-31 RX ORDER — IBUPROFEN 800 MG/1
800 TABLET, FILM COATED ORAL EVERY 6 HOURS PRN
Qty: 270 TABLET | Refills: 3 | Status: SHIPPED | OUTPATIENT
Start: 2019-10-31 | End: 2020-06-02

## 2019-10-31 RX ORDER — DOCUSATE SODIUM 100 MG/1
100 CAPSULE, LIQUID FILLED ORAL 3 TIMES DAILY PRN
COMMUNITY
End: 2022-09-30

## 2019-10-31 ASSESSMENT — ANXIETY QUESTIONNAIRES
2. NOT BEING ABLE TO STOP OR CONTROL WORRYING: SEVERAL DAYS
4. TROUBLE RELAXING: SEVERAL DAYS
1. FEELING NERVOUS, ANXIOUS, OR ON EDGE: NOT AT ALL
5. BEING SO RESTLESS THAT IT IS HARD TO SIT STILL: NOT AT ALL
7. FEELING AFRAID AS IF SOMETHING AWFUL MIGHT HAPPEN: NOT AT ALL
GAD7 TOTAL SCORE: 4
3. WORRYING TOO MUCH ABOUT DIFFERENT THINGS: SEVERAL DAYS
6. BECOMING EASILY ANNOYED OR IRRITABLE: SEVERAL DAYS

## 2019-10-31 ASSESSMENT — ASTHMA QUESTIONNAIRES
QUESTION_1 LAST FOUR WEEKS HOW MUCH OF THE TIME DID YOUR ASTHMA KEEP YOU FROM GETTING AS MUCH DONE AT WORK, SCHOOL OR AT HOME: NONE OF THE TIME
QUESTION_2 LAST FOUR WEEKS HOW OFTEN HAVE YOU HAD SHORTNESS OF BREATH: NOT AT ALL
QUESTION_4 LAST FOUR WEEKS HOW OFTEN HAVE YOU USED YOUR RESCUE INHALER OR NEBULIZER MEDICATION (SUCH AS ALBUTEROL): NOT AT ALL
ACT_TOTALSCORE: 24
QUESTION_5 LAST FOUR WEEKS HOW WOULD YOU RATE YOUR ASTHMA CONTROL: WELL CONTROLLED
QUESTION_3 LAST FOUR WEEKS HOW OFTEN DID YOUR ASTHMA SYMPTOMS (WHEEZING, COUGHING, SHORTNESS OF BREATH, CHEST TIGHTNESS OR PAIN) WAKE YOU UP AT NIGHT OR EARLIER THAN USUAL IN THE MORNING: NOT AT ALL

## 2019-10-31 ASSESSMENT — PAIN SCALES - GENERAL: PAINLEVEL: MODERATE PAIN (4)

## 2019-10-31 ASSESSMENT — PATIENT HEALTH QUESTIONNAIRE - PHQ9: SUM OF ALL RESPONSES TO PHQ QUESTIONS 1-9: 7

## 2019-10-31 ASSESSMENT — MIFFLIN-ST. JEOR: SCORE: 1196.28

## 2019-10-31 NOTE — NURSING NOTE
"Chief Complaint   Patient presents with     Physical     Wellness Visit       Initial BP (!) 142/70 (Patient Position: Sitting)   Pulse 86   Ht 1.6 m (5' 3\")   Wt 71.2 kg (157 lb)   SpO2 98%   BMI 27.81 kg/m   Estimated body mass index is 27.81 kg/m  as calculated from the following:    Height as of this encounter: 1.6 m (5' 3\").    Weight as of this encounter: 71.2 kg (157 lb).  Medication Reconciliation: complete  Janette Camacho LPN  "

## 2019-10-31 NOTE — LETTER
My Asthma Action Plan    Name: Anya Vicente   YOB: 1950  Date: 10/30/2019   My doctor: RADHAMES Bowman   My clinic: [facility]        My Rescue Medicine:   Albuterol inhaler (Proair/Ventolin/Proventil HFA)  2-4 puffs EVERY 4 HOURS as needed. Use a spacer if recommended by your provider.   My Asthma Severity:   Intermittent / Exercise Induced  Know your asthma triggers: Patient is unaware of triggers  Patient is unaware of triggers          GREEN ZONE   Good Control    I feel good    No cough or wheeze    Can work, sleep and play without asthma symptoms       Take your asthma control medicine every day.     1. If exercise triggers your asthma, take your rescue medication    15 minutes before exercise or sports, and    During exercise if you have asthma symptoms  2. Spacer to use with inhaler: If you have a spacer, make sure to use it with your inhaler             YELLOW ZONE Getting Worse  I have ANY of these:    I do not feel good    Cough or wheeze    Chest feels tight    Wake up at night   1. Keep taking your Green Zone medications  2. Start taking your rescue medicine:    every 20 minutes for up to 1 hour. Then every 4 hours for 24-48 hours.  3. If you stay in the Yellow Zone for more than 12-24 hours, contact your doctor.  4. If you do not return to the Green Zone in 12-24 hours or you get worse, start taking your oral steroid medicine if prescribed by your provider.           RED ZONE Medical Alert - Get Help  I have ANY of these:    I feel awful    Medicine is not helping    Breathing getting harder    Trouble walking or talking    Nose opens wide to breathe       1. Take your rescue medicine NOW  2. If your provider has prescribed an oral steroid medicine, start taking it NOW  3. Call your doctor NOW  4. If you are still in the Red Zone after 20 minutes and you have not reached your doctor:    Take your rescue medicine again and    Call 911 or go to the  emergency room right away    See your regular doctor within 2 weeks of an Emergency Room or Urgent Care visit for follow-up treatment.          Annual Reminders:  Meet with Asthma Educator,  Flu Shot in the Fall, consider Pneumonia Vaccination for patients with asthma (aged 19 and older).    Pharmacy: [id number] - [place], [place] - [address]                        Asthma Triggers  How To Control Things That Make Your Asthma Worse    Triggers are things that make your asthma worse.  Look at the list below to help you find your triggers and   what you can do about them. You can help prevent asthma flare-ups by staying away from your triggers.      Trigger                                                          What you can do   Cigarette Smoke  Tobacco smoke can make asthma worse. Do not allow smoking in your home, car or around you.  Be sure no one smokes at a child s day care or school.  If you smoke, ask your health care provider for ways to help you quit.  Ask family members to quit too.  Ask your health care provider for a referral to Quit Plan to help you quit smoking, or call 4-958-773-PLAN.     Colds, Flu, Bronchitis  These are common triggers of asthma. Wash your hands often.  Don t touch your eyes, nose or mouth.  Get a flu shot every year.     Dust Mites  These are tiny bugs that live in cloth or carpet. They are too small to see. Wash sheets and blankets in hot water every week.   Encase pillows and mattress in dust mite proof covers.  Avoid having carpet if you can. If you have carpet, vacuum weekly.   Use a dust mask and HEPA vacuum.   Pollen and Outdoor Mold  Some people are allergic to trees, grass, or weed pollen, or molds. Try to keep your windows closed.  Limit time out doors when pollen count is high.   Ask you health care provider about taking medicine during allergy season.     Animal Dander  Some people are allergic to skin flakes, urine or saliva from pets with fur or  feathers. Keep pets with fur or feathers out of your home.    If you can t keep the pet outdoors, then keep the pet out of your bedroom.  Keep the bedroom door closed.  Keep pets off cloth furniture and away from stuffed toys.     Mice, Rats, and Cockroaches  Some people are allergic to the waste from these pests.   Cover food and garbage.  Clean up spills and food crumbs.  Store grease in the refrigerator.   Keep food out of the bedroom.   Indoor Mold  This can be a trigger if your home has high moisture. Fix leaking faucets, pipes, or other sources of water.   Clean moldy surfaces.  Dehumidify basement if it is damp and smelly.   Smoke, Strong Odors, and Sprays  These can reduce air quality. Stay away from strong odors and sprays, such as perfume, powder, hair spray, paints, smoke incense, paint, cleaning products, candles and new carpet.   Exercise or Sports  Some people with asthma have this trigger. Be active!  Ask your doctor about taking medicine before sports or exercise to prevent symptoms.    Warm up for 5-10 minutes before and after sports or exercise.     Other Triggers of Asthma  Cold air:  Cover your nose and mouth with a scarf.  Sometimes laughing or crying can be a trigger.  Some medicines and food can trigger asthma.

## 2019-10-31 NOTE — LETTER
My Depression Action Plan  Name: Anya Vicente   Date of Birth 1950  Date: 10/30/2019    My doctor: Cindy Danielle   My clinic: [facility] [place]  [address]  [place] [address]  [phone]          GREEN    ZONE   Good Control    What it looks like:     Things are going generally well. You have normal up s and down s. You may even feel depressed from time to time, but bad moods usually last less than a day.   What you need to do:  1. Continue to care for yourself (see self care plan)  2. Check your depression survival kit and update it as needed  3. Follow your physician s recommendations including any medication.  4. Do not stop taking medication unless you consult with your physician first.           YELLOW         ZONE Getting Worse    What it looks like:     Depression is starting to interfere with your life.     It may be hard to get out of bed; you may be starting to isolate yourself from others.    Symptoms of depression are starting to last most all day and this has happened for several days.     You may have suicidal thoughts but they are not constant.   What you need to do:     1. Call your care team, your response to treatment will improve if you keep your care team informed of your progress. Yellow periods are signs an adjustment may need to be made.     2. Continue your self-care, even if you have to fake it!    3. Talk to someone in your support network    4. Open up your depression survival kit           RED    ZONE Medical Alert - Get Help    What it looks like:     Depression is seriously interfering with your life.     You may experience these or other symptoms: You can t get out of bed most days, can t work or engage in other necessary activities, you have trouble taking care of basic hygiene, or basic responsibilities, thoughts of suicide or death that will not go away, self-injurious behavior.     What you need to do:  1. Call your care team  and request a same-day appointment. If they are not available (weekends or after hours) call your local crisis line, emergency room or 911.            Depression Self Care Plan / Survival Kit    Self-Care for Depression  Here s the deal. Your body and mind are really not as separate as most people think.  What you do and think affects how you feel and how you feel influences what you do and think. This means if you do things that people who feel good do, it will help you feel better.  Sometimes this is all it takes.  There is also a place for medication and therapy depending on how severe your depression is, so be sure to consult with your medical provider and/ or Behavioral Health Consultant if your symptoms are worsening or not improving.     In order to better manage my stress, I will:    Exercise  Get some form of exercise, every day. This will help reduce pain and release endorphins, the  feel good  chemicals in your brain. This is almost as good as taking antidepressants!  This is not the same as joining a gym and then never going! (they count on that by the way ) It can be as simple as just going for a walk or doing some gardening, anything that will get you moving.      Hygiene   Maintain good hygiene (Get out of bed in the morning, Make your bed, Brush your teeth, Take a shower, and Get dressed like you were going to work, even if you are unemployed).  If your clothes don't fit try to get ones that do.    Diet  I will strive to eat foods that are good for me, drink plenty of water, and avoid excessive sugar, caffeine, alcohol, and other mood-altering substances.  Some foods that are helpful in depression are: complex carbohydrates, B vitamins, flaxseed, fish or fish oil, fresh fruits and vegetables.    Psychotherapy  I agree to participate in Individual Therapy (if recommended).    Medication  If prescribed medications, I agree to take them.  Missing doses can result in serious side effects.  I understand  that drinking alcohol, or other illicit drug use, may cause potential side effects.  I will not stop my medication abruptly without first discussing it with my provider.    Staying Connected With Others  I will stay in touch with my friends, family members, and my primary care provider/team.    Use your imagination  Be creative.  We all have a creative side; it doesn t matter if it s oil painting, sand castles, or mud pies! This will also kick up the endorphins.    Witness Beauty  (AKA stop and smell the roses) Take a look outside, even in mid-winter. Notice colors, textures. Watch the squirrels and birds.     Service to others  Be of service to others.  There is always someone else in need.  By helping others we can  get out of ourselves  and remember the really important things.  This also provides opportunities for practicing all the other parts of the program.    Humor  Laugh and be silly!  Adjust your TV habits for less news and crime-drama and more comedy.    Control your stress  Try breathing deep, massage therapy, biofeedback, and meditation. Find time to relax each day.     My support system    Clinic Contact:  Phone number:    Contact 1:  Phone number:    Contact 2:  Phone number:    Nondenominational/:  Phone number:    Therapist:  Phone number:    Local crisis center:    Phone number:    Other community support:  Phone number:

## 2019-11-02 ASSESSMENT — ANXIETY QUESTIONNAIRES: GAD7 TOTAL SCORE: 4

## 2019-11-02 ASSESSMENT — ASTHMA QUESTIONNAIRES: ACT_TOTALSCORE: 24

## 2019-11-12 ENCOUNTER — ANCILLARY PROCEDURE (OUTPATIENT)
Dept: MAMMOGRAPHY | Facility: OTHER | Age: 69
End: 2019-11-12
Attending: PHYSICIAN ASSISTANT
Payer: MEDICARE

## 2019-11-12 DIAGNOSIS — Z12.31 VISIT FOR SCREENING MAMMOGRAM: ICD-10-CM

## 2019-11-12 PROCEDURE — 77063 BREAST TOMOSYNTHESIS BI: CPT | Mod: TC

## 2019-12-30 ENCOUNTER — TRANSFERRED RECORDS (OUTPATIENT)
Dept: HEALTH INFORMATION MANAGEMENT | Facility: CLINIC | Age: 69
End: 2019-12-30

## 2020-01-28 DIAGNOSIS — J45.20 MILD INTERMITTENT ASTHMA WITHOUT COMPLICATION: ICD-10-CM

## 2020-01-29 ENCOUNTER — MYC REFILL (OUTPATIENT)
Dept: SLEEP MEDICINE | Facility: HOSPITAL | Age: 70
End: 2020-01-29

## 2020-01-29 ENCOUNTER — MYC MEDICAL ADVICE (OUTPATIENT)
Dept: FAMILY MEDICINE | Facility: OTHER | Age: 70
End: 2020-01-29

## 2020-01-29 DIAGNOSIS — J45.20 MILD INTERMITTENT ASTHMA WITHOUT COMPLICATION: ICD-10-CM

## 2020-01-29 DIAGNOSIS — J40 BRONCHITIS: Primary | ICD-10-CM

## 2020-01-29 RX ORDER — ALBUTEROL SULFATE 90 UG/1
2 AEROSOL, METERED RESPIRATORY (INHALATION) ONCE
Status: ACTIVE | OUTPATIENT
Start: 2020-01-29

## 2020-01-30 ENCOUNTER — MYC MEDICAL ADVICE (OUTPATIENT)
Dept: FAMILY MEDICINE | Facility: OTHER | Age: 70
End: 2020-01-30

## 2020-01-30 ENCOUNTER — MYC REFILL (OUTPATIENT)
Dept: SLEEP MEDICINE | Facility: HOSPITAL | Age: 70
End: 2020-01-30

## 2020-01-30 DIAGNOSIS — J98.01 BRONCHOSPASM: Primary | ICD-10-CM

## 2020-01-30 DIAGNOSIS — J45.20 MILD INTERMITTENT ASTHMA WITHOUT COMPLICATION: ICD-10-CM

## 2020-01-30 RX ORDER — PREDNISONE 20 MG/1
TABLET ORAL
Qty: 21 TABLET | Refills: 0 | Status: SHIPPED | OUTPATIENT
Start: 2020-01-30 | End: 2020-02-28

## 2020-01-30 RX ORDER — PREDNISONE 20 MG/1
TABLET ORAL
Qty: 21 TABLET | Refills: 1 | Status: SHIPPED | OUTPATIENT
Start: 2020-01-30 | End: 2020-01-30

## 2020-01-30 RX ORDER — ALBUTEROL SULFATE 90 UG/1
2 AEROSOL, METERED RESPIRATORY (INHALATION) EVERY 6 HOURS
Qty: 1 INHALER | Refills: 3 | Status: SHIPPED | OUTPATIENT
Start: 2020-01-30 | End: 2020-07-23

## 2020-01-30 NOTE — TELEPHONE ENCOUNTER
Prednisone    Last Written Prescription Date:  11.1.17  Last Fill Quantity: 21,   # refills: 0  Last Office Visit: 10.31.19  Future Office visit:       Routing refill request to provider for review/approval because:  Drug not on the [facility] or [facility] refill protocol or controlled substance    Pended.Thank you,    Darline Vilchis RN

## 2020-02-03 RX ORDER — PREDNISONE 20 MG/1
TABLET ORAL
Qty: 21 TABLET | Refills: 1 | Status: SHIPPED | OUTPATIENT
Start: 2020-02-03 | End: 2020-02-28

## 2020-02-10 ENCOUNTER — NURSE TRIAGE (OUTPATIENT)
Dept: FAMILY MEDICINE | Facility: OTHER | Age: 70
End: 2020-02-10

## 2020-02-10 NOTE — TELEPHONE ENCOUNTER
Patient calling and states she thinks she has RSV. Her spouse had it and she thinks she has it now. Asked if she is having difficulty breathing and she said yes a little and still taking the inhalers.She state it can be productive. Patient coughing and states her chest is rattly. Updated her if she is having difficulty breathing than she needs to go to ED /UC. Updated her that will look at PCP schedule but it may be full.She states she will go to the Urgent care.    Darline Vilchis RN    Additional Information    Negative: Severe difficulty breathing (e.g., struggling for each breath, speaks in single words)    Negative: Bluish (or gray) lips or face now    Negative: [1] Difficulty breathing AND [2] exposure to flames, smoke, or fumes    Negative: [1] Stridor AND [2] difficulty breathing    Negative: Sounds like a life-threatening emergency to the triager    Protocols used: COUGH - ACUTE [id number]

## 2020-02-11 ENCOUNTER — APPOINTMENT (OUTPATIENT)
Dept: GENERAL RADIOLOGY | Facility: HOSPITAL | Age: 70
End: 2020-02-11
Attending: INTERNAL MEDICINE
Payer: MEDICARE

## 2020-02-11 ENCOUNTER — HOSPITAL ENCOUNTER (EMERGENCY)
Facility: HOSPITAL | Age: 70
Discharge: HOME | End: 2020-02-11
Attending: INTERNAL MEDICINE | Admitting: INTERNAL MEDICINE
Payer: MEDICARE

## 2020-02-11 VITALS
DIASTOLIC BLOOD PRESSURE: 78 MMHG | HEART RATE: 76 BPM | RESPIRATION RATE: 21 BRPM | OXYGEN SATURATION: 98 % | SYSTOLIC BLOOD PRESSURE: 149 MMHG

## 2020-02-11 DIAGNOSIS — J20.9 ACUTE BRONCHITIS, UNSPECIFIED ORGANISM: ICD-10-CM

## 2020-02-11 LAB
ALBUMIN SERPL-MCNC: 3.4 G/DL (ref 3.4–5)
ALP SERPL-CCNC: 87 U/L (ref 40–150)
ALT SERPL W P-5'-P-CCNC: 18 U/L (ref 0–50)
ANION GAP SERPL CALCULATED.3IONS-SCNC: 7 MMOL/L (ref 3–14)
AST SERPL W P-5'-P-CCNC: 12 U/L (ref 0–45)
BASOPHILS # BLD AUTO: 0.1 10E9/L (ref 0–0.2)
BASOPHILS NFR BLD AUTO: 0.5 %
BILIRUB SERPL-MCNC: 0.2 MG/DL (ref 0.2–1.3)
BUN SERPL-MCNC: 22 MG/DL (ref 7–30)
CALCIUM SERPL-MCNC: 9.1 MG/DL (ref 8.5–10.1)
CHLORIDE SERPL-SCNC: 108 MMOL/L (ref 94–109)
CO2 SERPL-SCNC: 25 MMOL/L (ref 20–32)
CREAT SERPL-MCNC: 0.72 MG/DL (ref 0.52–1.04)
DIFFERENTIAL METHOD BLD: ABNORMAL
EOSINOPHIL # BLD AUTO: 0.1 10E9/L (ref 0–0.7)
EOSINOPHIL NFR BLD AUTO: 1 %
ERYTHROCYTE [DISTWIDTH] IN BLOOD BY AUTOMATED COUNT: 13.2 % (ref 10–15)
GFR SERPL CREATININE-BSD FRML MDRD: 84 ML/MIN/{1.73_M2}
GLUCOSE SERPL-MCNC: 115 MG/DL (ref 70–99)
HCT VFR BLD AUTO: 39.1 % (ref 35–47)
HGB BLD-MCNC: 13.2 G/DL (ref 11.7–15.7)
IMM GRANULOCYTES # BLD: 0 10E9/L (ref 0–0.4)
IMM GRANULOCYTES NFR BLD: 0.4 %
LYMPHOCYTES # BLD AUTO: 2.7 10E9/L (ref 0.8–5.3)
LYMPHOCYTES NFR BLD AUTO: 24.2 %
MCH RBC QN AUTO: 30.8 PG (ref 26.5–33)
MCHC RBC AUTO-ENTMCNC: 33.8 G/DL (ref 31.5–36.5)
MCV RBC AUTO: 91 FL (ref 78–100)
MONOCYTES # BLD AUTO: 0.8 10E9/L (ref 0–1.3)
MONOCYTES NFR BLD AUTO: 7.5 %
NEUTROPHILS # BLD AUTO: 7.5 10E9/L (ref 1.6–8.3)
NEUTROPHILS NFR BLD AUTO: 66.4 %
NRBC # BLD AUTO: 0 10*3/UL
NRBC BLD AUTO-RTO: 0 /100
PLATELET # BLD AUTO: 382 10E9/L (ref 150–450)
POTASSIUM SERPL-SCNC: 3.8 MMOL/L (ref 3.4–5.3)
PROT SERPL-MCNC: 7 G/DL (ref 6.8–8.8)
RBC # BLD AUTO: 4.29 10E12/L (ref 3.8–5.2)
SODIUM SERPL-SCNC: 140 MMOL/L (ref 133–144)
TROPONIN I SERPL-MCNC: <0.015 UG/L (ref 0–0.04)
WBC # BLD AUTO: 11.3 10E9/L (ref 4–11)

## 2020-02-11 PROCEDURE — 71046 X-RAY EXAM CHEST 2 VIEWS: CPT | Mod: TC

## 2020-02-11 PROCEDURE — 93005 ELECTROCARDIOGRAM TRACING: CPT

## 2020-02-11 PROCEDURE — 96374 THER/PROPH/DIAG INJ IV PUSH: CPT

## 2020-02-11 PROCEDURE — 93010 ELECTROCARDIOGRAM REPORT: CPT | Performed by: INTERNAL MEDICINE

## 2020-02-11 PROCEDURE — 25000125 ZZHC RX 250: Performed by: INTERNAL MEDICINE

## 2020-02-11 PROCEDURE — 99285 EMERGENCY DEPT VISIT HI MDM: CPT | Mod: Z6 | Performed by: INTERNAL MEDICINE

## 2020-02-11 PROCEDURE — 25000132 ZZH RX MED GY IP 250 OP 250 PS 637: Performed by: INTERNAL MEDICINE

## 2020-02-11 PROCEDURE — 84484 ASSAY OF TROPONIN QUANT: CPT | Performed by: INTERNAL MEDICINE

## 2020-02-11 PROCEDURE — 85025 COMPLETE CBC W/AUTO DIFF WBC: CPT | Performed by: INTERNAL MEDICINE

## 2020-02-11 PROCEDURE — 36415 COLL VENOUS BLD VENIPUNCTURE: CPT | Performed by: INTERNAL MEDICINE

## 2020-02-11 PROCEDURE — 25000128 H RX IP 250 OP 636: Performed by: INTERNAL MEDICINE

## 2020-02-11 PROCEDURE — 94640 AIRWAY INHALATION TREATMENT: CPT

## 2020-02-11 PROCEDURE — 25800030 ZZH RX IP 258 OP 636: Performed by: INTERNAL MEDICINE

## 2020-02-11 PROCEDURE — 80053 COMPREHEN METABOLIC PANEL: CPT | Performed by: INTERNAL MEDICINE

## 2020-02-11 PROCEDURE — 99285 EMERGENCY DEPT VISIT HI MDM: CPT | Mod: 25

## 2020-02-11 RX ORDER — BENZONATATE 100 MG/1
100 CAPSULE ORAL ONCE
Status: COMPLETED | OUTPATIENT
Start: 2020-02-11 | End: 2020-02-11

## 2020-02-11 RX ORDER — IPRATROPIUM BROMIDE AND ALBUTEROL SULFATE 2.5; .5 MG/3ML; MG/3ML
1 SOLUTION RESPIRATORY (INHALATION) EVERY 6 HOURS PRN
Qty: 75 ML | Refills: 0 | Status: SHIPPED | OUTPATIENT
Start: 2020-02-11 | End: 2020-07-07

## 2020-02-11 RX ORDER — IPRATROPIUM BROMIDE AND ALBUTEROL SULFATE 2.5; .5 MG/3ML; MG/3ML
3 SOLUTION RESPIRATORY (INHALATION) ONCE
Status: COMPLETED | OUTPATIENT
Start: 2020-02-11 | End: 2020-02-11

## 2020-02-11 RX ORDER — IPRATROPIUM BROMIDE AND ALBUTEROL SULFATE .5; 3 MG/3ML; MG/3ML
3 SOLUTION RESPIRATORY (INHALATION)
Status: DISCONTINUED | OUTPATIENT
Start: 2020-02-11 | End: 2020-02-11 | Stop reason: HOSPADM

## 2020-02-11 RX ORDER — AZITHROMYCIN 250 MG/1
500 TABLET, FILM COATED ORAL ONCE
Status: COMPLETED | OUTPATIENT
Start: 2020-02-11 | End: 2020-02-11

## 2020-02-11 RX ORDER — AZITHROMYCIN 250 MG/1
TABLET, FILM COATED ORAL
Qty: 6 TABLET | Refills: 0 | Status: SHIPPED | OUTPATIENT
Start: 2020-02-12 | End: 2020-02-28

## 2020-02-11 RX ORDER — IPRATROPIUM BROMIDE AND ALBUTEROL SULFATE .5; 3 MG/3ML; MG/3ML
SOLUTION RESPIRATORY (INHALATION)
Status: DISCONTINUED
Start: 2020-02-11 | End: 2020-02-11 | Stop reason: HOSPADM

## 2020-02-11 RX ORDER — PREDNISONE 20 MG/1
TABLET ORAL
Qty: 10 TABLET | Refills: 0 | Status: SHIPPED | OUTPATIENT
Start: 2020-02-11 | End: 2020-02-28

## 2020-02-11 RX ORDER — METHYLPREDNISOLONE SODIUM SUCCINATE 125 MG/2ML
125 INJECTION, POWDER, LYOPHILIZED, FOR SOLUTION INTRAMUSCULAR; INTRAVENOUS ONCE
Status: COMPLETED | OUTPATIENT
Start: 2020-02-11 | End: 2020-02-11

## 2020-02-11 RX ADMIN — AZITHROMYCIN 500 MG: 250 TABLET, FILM COATED ORAL at 02:00

## 2020-02-11 RX ADMIN — IPRATROPIUM BROMIDE AND ALBUTEROL SULFATE 3 ML: .5; 3 SOLUTION RESPIRATORY (INHALATION) at 01:00

## 2020-02-11 RX ADMIN — SODIUM CHLORIDE 1000 ML: 9 INJECTION, SOLUTION INTRAVENOUS at 01:16

## 2020-02-11 RX ADMIN — METHYLPREDNISOLONE SODIUM SUCCINATE 125 MG: 125 INJECTION, POWDER, FOR SOLUTION INTRAMUSCULAR; INTRAVENOUS at 01:17

## 2020-02-11 RX ADMIN — BENZONATATE 100 MG: 100 CAPSULE ORAL at 01:17

## 2020-02-11 NOTE — PROGRESS NOTES
Called to pt room for neb. /73   Pulse 79   Resp 21   SpO2 98%   BS clear pre and post neb.   Pt sent home with home nebulizer machine and Duoneb.

## 2020-02-11 NOTE — ED NOTES
Patient given written and verbal discharge instructions and patient verbalizes understanding. Patient given neb machine and TH pack of duonebs by RT Jenifer. Patient also given written prescriptions for duonebs, zithromax, and prednisone. Patient left ambulatory with .

## 2020-02-11 NOTE — ED AVS SNAPSHOT
[facility]  [address] [address]  Phone:  [phone]                                    Anya Vicente   MRN: [id number]    Department:  [facility]   Date of Visit:  2/11/2020           After Visit Summary Signature Page    I have received my discharge instructions, and my questions have been answered. I have discussed any challenges I see with this plan with the nurse or doctor.    ..........................................................................................................................................  Patient/Patient Representative Signature      ..........................................................................................................................................  Patient Representative Print Name and Relationship to Patient    ..................................................               ................................................  Date                                   Time    ..........................................................................................................................................  Reviewed by Signature/Title    ...................................................              ..............................................  Date                                               Time          22EPIC Rev 08/18

## 2020-02-11 NOTE — ED NOTES
Patient presents with URI symptoms and productive cough since 1/18/20. Patient states that she and  both got sick after travel to [place]. Patient notes that  was positive for RSV. Patient notes history of asthma. Patient states that she started having chest discomfort. Patient into gown, IV established and EKG ordered. Call light in reach.

## 2020-02-14 DIAGNOSIS — N39.46 MIXED STRESS AND URGE URINARY INCONTINENCE: Primary | ICD-10-CM

## 2020-02-15 ASSESSMENT — ENCOUNTER SYMPTOMS
COUGH: 1
ABDOMINAL PAIN: 0
FLANK PAIN: 0
WHEEZING: 1
VOICE CHANGE: 0
ANAL BLEEDING: 0
SHORTNESS OF BREATH: 1
MYALGIAS: 1
CONFUSION: 0
FEVER: 0
HEMATURIA: 0
DIZZINESS: 0
NUMBNESS: 0
PALPITATIONS: 0
ARTHRALGIAS: 0
HEADACHES: 0
DIAPHORESIS: 0
WOUND: 0
NAUSEA: 0
VOMITING: 0
BACK PAIN: 0
DYSURIA: 0
NECK STIFFNESS: 0
LIGHT-HEADEDNESS: 0
COLOR CHANGE: 0
CHILLS: 0
ABDOMINAL DISTENTION: 0
BLOOD IN STOOL: 0
CHEST TIGHTNESS: 1
NECK PAIN: 0

## 2020-02-15 NOTE — ED PROVIDER NOTES
"  History     Chief Complaint   Patient presents with     URI     Shortness of Breath     Chest Pain     The history is provided by the patient.   URI   Presenting symptoms: congestion and cough    Presenting symptoms: no fever    Severity:  Moderate  Onset quality:  Gradual  Timing:  Constant  Progression:  Worsening  Chronicity:  New  Associated symptoms: myalgias and wheezing    Associated symptoms: no arthralgias, no headaches and no neck pain         Allergies:  Allergies   Allergen Reactions     Acetaminophen      Darvocet-N 100       Bupropion Other (See Comments)     Increased anxiety     Clonidine      Dropped BP Drastically     Codamine Itching and Other (See Comments)     \"Buzzy\"     Codeine      Hydrocodone      \"Patient states can take liquid med but not pill\".     Meperidine      Oxycodone-Aspirin Itching, Nausea and Vomiting and Other (See Comments)     \"Buzzy\"     Pimozide Other (See Comments)     Drowsiness     Propoxyphene Napsylate      Darvocet-N 100     Seasonal Allergies Other (See Comments)     Seasonal allergies.  Per 7/3/07, runny nose, stuffy, plugged ears.       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) 07/31/2013     Priority: Medium     Advanced care planning/counseling discussion 10/10/2012     Priority: Medium     Complete rupture of rotator cuff 03/09/2009     Priority: Medium     Glucose intolerance 10/16/2008     Priority: Medium     Overview:   IMO Update 10/11       Esophageal reflux 03/26/2008     Priority: Medium     Pain in joint, " ankle and foot 08/21/2007     Priority: Medium     Overview:   IMO Update 10/11       Open bimalleolar fracture 08/16/2007     Priority: Medium     Myalgia and myositis 05/01/2007     Priority: Medium     Overview:   IMO Update 10/11       Other malaise and fatigue 05/01/2007     Priority: Medium        Past Medical History:    Past Medical History:   Diagnosis Date     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 02/22/2011     Depression 01/01/2011     Esophageal reflux 02/22/2011     Fibromyalgia 03/18/2005     Insomnia 8/27/2014     Irritable bowel syndrome 02/22/2011     Menopausal or female climacteric states 02/21/2007     Mitral valve disorders(424.0) 02/22/2011     Myalgia and myositis, unspecified 02/25/2008     Narcolepsy      Other affections of shoulder region, not elsewhere 10/01/2008     Other follow-up examination(V67.59) 03/18/2005     Other screening mammogram 09/29/2006     Other specified pre-operative examination 05/04/2005     Posttraumatic stress disorder 02/22/2011     Restless legs syndrome (RLS) 02/22/2011     Sebaceous cyst 08/19/2005     Tourette's disorder 02/22/2011     Ventral hernia 09/15/2011       Past Surgical History:    Past Surgical History:   Procedure Laterality Date     BILATERAL CATARACT EXTRACTION  2006 01/01/2011     Bunion Surgery > RT  2010     COLONOSCOPY  12/23/2013    Procedure: COLONOSCOPY;  COLONOSCOPY;  Surgeon: Kasia Enrique DO;  Location: [facility]     D&C  1991    01/01/2011     deviated septum repair  2012     ECHOCARDIOGRAM  2006 01/01/2011     HEMORRHOIDECTOMY  1979    01/01/2011     HYSTERECTOMY  1995    01/01/2011     LAPAROSCOPIC HERNIORRHAPHY VENTRAL  1/16/2014    Procedure: LAPAROSCOPIC HERNIORRHAPHY VENTRAL;  LAPAROSCOPIC VENTRAL HERNIA REPAIR W/ MESH;  Surgeon: Kasia Enrique DO;  Location: [facility]     leg repair after fx  >LT  2009     RELEASE CARPAL TUNNEL  2010    RT     TRIGGER  THUMB RELEASE  2011       Family History:    Family  History   Problem Relation Age of Onset     Alcohol/Drug Father 42        Motor Vehicle Accident due to Alcoholism - Cause of Death     Diabetes Maternal Uncle      Parkinsonism Maternal Uncle      Depression Mother      Hypertension Mother      Cancer Mother         Cervical     Parkinsonism Mother      Other - See Comments Sister         ADD/ADHD     Other - See Comments Sister         Fibromyalgia     Diabetes Sister      Diabetes Sister      Other - See Comments Sister         ADD/ADHD     Other - See Comments Son         ADD/ADHD     Other - See Comments Son         ADD/ADHD       Social History:  Marital Status:   [2]  Social History     Tobacco Use     Smoking status: Never Smoker     Smokeless tobacco: Never Used   Substance Use Topics     Alcohol use: Yes     Comment: RARELY     Drug use: No        Medications:    azithromycin (ZITHROMAX Z-KAROLINA) 250 MG tablet  ipratropium - albuterol 0.5 mg/2.5 mg/3 mL (DUONEB) 0.5-2.5 (3) MG/3ML neb solution  predniSONE (DELTASONE) 20 MG tablet  albuterol (PROAIR HFA/PROVENTIL HFA/VENTOLIN HFA) 108 (90 Base) MCG/ACT inhaler  amphetamine-dextroamphetamine (ADDERALL) 20 MG per tablet  buPROPion (WELLBUTRIN XL) 300 MG 24 hr tablet  Calcium Carbonate-Vit D-Min (CALCIUM 1200 PO)  cyclobenzaprine (FLEXERIL) 5 MG tablet  docusate sodium (COLACE) 100 MG capsule  hydrochlorothiazide (HYDRODIURIL) 50 MG tablet  ibuprofen (ADVIL/MOTRIN) 800 MG tablet  ORDER FOR DME  ORDER FOR DME  ORDER FOR DME  oxybutynin ER (DITROPAN-XL) 5 MG 24 hr tablet  pramipexole (MIRAPEX) 0.5 MG tablet  predniSONE (DELTASONE) 20 MG tablet  predniSONE (DELTASONE) 20 MG tablet  traMADol (ULTRAM) 50 MG tablet  TURMERIC CURCUMIN PO  zolpidem (AMBIEN) 5 MG tablet          Review of Systems   Constitutional: Negative for chills, diaphoresis and fever.   HENT: Positive for congestion. Negative for voice change.    Eyes: Negative for visual disturbance.   Respiratory: Positive for cough, chest tightness,  shortness of breath and wheezing.    Cardiovascular: Negative for chest pain, palpitations and leg swelling.   Gastrointestinal: Negative for abdominal distention, abdominal pain, anal bleeding, blood in stool, nausea and vomiting.   Genitourinary: Negative for decreased urine volume, dysuria, flank pain and hematuria.   Musculoskeletal: Positive for myalgias. Negative for arthralgias, back pain, gait problem, neck pain and neck stiffness.   Skin: Negative for color change, pallor, rash and wound.   Neurological: Negative for dizziness, syncope, light-headedness, numbness and headaches.   Psychiatric/Behavioral: Negative for confusion and suicidal ideas.       Physical Exam   BP: 132/74  Pulse: 79  Heart Rate: 79  Resp: 20  SpO2: 95 %      Physical Exam  Vitals signs and nursing note reviewed.   Constitutional:       Appearance: She is well-developed.   HENT:      Head: Normocephalic and atraumatic.      Mouth/Throat:      Pharynx: No oropharyngeal exudate.   Eyes:      Conjunctiva/sclera: Conjunctivae normal.      Pupils: Pupils are equal, round, and reactive to light.   Neck:      Musculoskeletal: Normal range of motion and neck supple.      Thyroid: No thyromegaly.      Vascular: No JVD.      Trachea: No tracheal deviation.   Cardiovascular:      Rate and Rhythm: Normal rate and regular rhythm.      Heart sounds: Normal heart sounds. No murmur. No friction rub. No gallop.    Pulmonary:      Effort: Pulmonary effort is normal. No respiratory distress.      Breath sounds: No stridor. Examination of the right-middle field reveals wheezing. Examination of the left-middle field reveals wheezing. Wheezing present. No rales.   Chest:      Chest wall: No tenderness.   Abdominal:      General: Bowel sounds are normal. There is no distension.      Palpations: Abdomen is soft. There is no mass.      Tenderness: There is no abdominal tenderness. There is no guarding or rebound.   Musculoskeletal: Normal range of motion.          General: No tenderness.   Lymphadenopathy:      Cervical: No cervical adenopathy.   Skin:     General: Skin is warm and dry.      Coloration: Skin is not pale.      Findings: No erythema or rash.   Neurological:      Mental Status: She is alert and oriented to person, place, and time.   Psychiatric:         Behavior: Behavior normal.         ED Course        Procedures                 No results found for this or any previous visit (from the past 24 hour(s)).    Medications   ipratropium - albuterol 0.5 mg/2.5 mg/3 mL (DUONEB) neb solution 3 mL (3 mLs Nebulization Given 2/11/20 0100)   methylPREDNISolone sodium succinate (solu-MEDROL) injection 125 mg (125 mg Intravenous Given 2/11/20 0117)   benzonatate (TESSALON) capsule 100 mg (100 mg Oral Given 2/11/20 0117)   0.9% sodium chloride BOLUS (0 mLs Intravenous Stopped 2/11/20 0149)   azithromycin (ZITHROMAX) tablet 500 mg (500 mg Oral Given 2/11/20 0200)       Assessments & Plan (with Medical Decision Making)   URI symptoms , chest tightness, wheezing  Symptoms dramatically improved after receiving neb treatment  EKG NSR  CXR negative  zithro + prednisone started + Neb treatment at home  Follow-up with PCP    I have reviewed the nursing notes.    I have reviewed the findings, diagnosis, plan and need for follow up with the patient.      Discharge Medication List as of 2/11/2020  1:56 AM      START taking these medications    Details   azithromycin (ZITHROMAX Z-KAROLINA) 250 MG tablet Two tablets on the first day, then one tablet daily for the next 4 days, Disp-6 tablet, R-0, Local Print      ipratropium - albuterol 0.5 mg/2.5 mg/3 mL (DUONEB) 0.5-2.5 (3) MG/3ML neb solution Take 1 vial (3 mLs) by nebulization every 6 hours as needed for shortness of breath / dyspnea or wheezing, Disp-75 mL, R-0, Local Print      !! predniSONE (DELTASONE) 20 MG tablet Take two tablets (= 40mg) each day for 5 (five) days, Disp-10 tablet, R-0, Local Print       !! - Potential duplicate  medications found. Please discuss with provider.          Final diagnoses:   Acute bronchitis, unspecified organism       2/11/2020   [facility]     Leon Bryant MD  02/15/20 [id number]

## 2020-02-28 ENCOUNTER — OFFICE VISIT (OUTPATIENT)
Dept: UROLOGY | Facility: OTHER | Age: 70
End: 2020-02-28
Attending: UROLOGY
Payer: COMMERCIAL

## 2020-02-28 VITALS
WEIGHT: 157 LBS | OXYGEN SATURATION: 98 % | HEIGHT: 64 IN | HEART RATE: 99 BPM | DIASTOLIC BLOOD PRESSURE: 72 MMHG | TEMPERATURE: 98 F | SYSTOLIC BLOOD PRESSURE: 128 MMHG | BODY MASS INDEX: 26.8 KG/M2

## 2020-02-28 DIAGNOSIS — N39.46 MIXED STRESS AND URGE URINARY INCONTINENCE: Primary | ICD-10-CM

## 2020-02-28 PROCEDURE — G0463 HOSPITAL OUTPT CLINIC VISIT: HCPCS

## 2020-02-28 PROCEDURE — 99203 OFFICE O/P NEW LOW 30 MIN: CPT | Performed by: UROLOGY

## 2020-02-28 RX ORDER — OXYBUTYNIN CHLORIDE 10 MG/1
10 TABLET, EXTENDED RELEASE ORAL DAILY
Qty: 30 TABLET | Refills: 11 | Status: SHIPPED | OUTPATIENT
Start: 2020-02-28 | End: 2020-08-26

## 2020-02-28 RX ORDER — PREDNISONE 20 MG/1
20 TABLET ORAL PRN
COMMUNITY
Start: 2020-02-11 | End: 2022-01-17

## 2020-02-28 ASSESSMENT — MIFFLIN-ST. JEOR: SCORE: 1204.21

## 2020-02-28 ASSESSMENT — ENCOUNTER SYMPTOMS
SHORTNESS OF BREATH: 1
CONSTIPATION: 1
BACK PAIN: 1
WHEEZING: 1
PALPITATIONS: 1

## 2020-02-28 ASSESSMENT — PAIN SCALES - GENERAL: PAINLEVEL: NO PAIN (0)

## 2020-02-28 NOTE — PROGRESS NOTES
"  History     Chief Complaint:    Consult (1 year follow up mixed incontinence. Dulce pt)      HPI   Anya Vicente is a 69 year old female who presents with a history of mixed incontinence.  Chantel states she has been incontinent for at least 5 years but it is gotten worse in the last 2 years.  A year ago she was placed on oxybutynin XL 5 mg and that seemed to help quite a bit.  2 months ago she had a terrible upper respiratory virus and she was coughing a lot and having a lot of leakage with that.  Now that her upper respiratory infection is been taken care of that she is doing much better with her incontinence.  She does void about every 2 hours in the day and gets up 3-4 times at night.  She denies any blood, no dysuria, no urinary tract infections.  She leaks both with urgency and she leaks with coughing and sneezing.  Right now she feels the coughing and sneezing is the bigger problem.  She is using 3 medium pads a day.  She does have a history of constipation but does not seem to be affected by the oxybutynin.    Allergies:    Allergies   Allergen Reactions     Acetaminophen      Darvocet-N 100       Bupropion Other (See Comments)     Increased anxiety     Clonidine      Dropped BP Drastically     Codamine Itching and Other (See Comments)     \"Buzzy\"     Codeine      Hydrocodone      \"Patient states can take liquid med but not pill\".     Meperidine      Oxycodone-Aspirin Itching, Nausea and Vomiting and Other (See Comments)     \"Buzzy\"     Pimozide Other (See Comments)     Drowsiness     Propoxyphene Napsylate      Darvocet-N 100     Seasonal Allergies Other (See Comments)     Seasonal allergies.  Per 7/3/07, runny nose, stuffy, plugged ears.        Medications:      amphetamine-dextroamphetamine (ADDERALL) 20 MG per tablet  buPROPion (WELLBUTRIN XL) 300 MG 24 hr tablet  Calcium Carbonate-Vit D-Min (CALCIUM 1200 PO)  docusate sodium (COLACE) 100 MG capsule  hydrochlorothiazide (HYDRODIURIL) 50 MG " tablet  ORDER FOR DME  ORDER FOR DME  ORDER FOR DME  oxybutynin ER (DITROPAN-XL) 10 MG 24 hr tablet  oxybutynin ER (DITROPAN-XL) 5 MG 24 hr tablet  pramipexole (MIRAPEX) 0.5 MG tablet  predniSONE (DELTASONE) 20 MG tablet  traMADol (ULTRAM) 50 MG tablet  TURMERIC CURCUMIN PO  zolpidem (AMBIEN) 5 MG tablet  albuterol (PROAIR HFA/PROVENTIL HFA/VENTOLIN HFA) 108 (90 Base) MCG/ACT inhaler  cyclobenzaprine (FLEXERIL) 5 MG tablet  ibuprofen (ADVIL/MOTRIN) 800 MG tablet  ipratropium - albuterol 0.5 mg/2.5 mg/3 mL (DUONEB) 0.5-2.5 (3) MG/3ML neb solution        Problem List:      Patient Active Problem List    Diagnosis Date Noted     Mixed stress and urge urinary incontinence 12/26/2018     Priority: Medium     Leg cramps 12/26/2018     Priority: Medium     Vitamin D deficiency disease 12/26/2018     Priority: Medium     Major depressive disorder 01/16/2015     Priority: Medium     Insomnia 08/27/2014     Priority: Medium     Glucose intolerance (impaired glucose tolerance) 11/14/2013     Priority: Medium     Narcolepsy without 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) 07/31/2013     Priority: Medium     Advanced care planning/counseling discussion 10/10/2012     Priority: Medium     Complete rupture of rotator cuff 03/09/2009     Priority: Medium     Glucose intolerance 10/16/2008     Priority: Medium     Overview:   IMO Update 10/11       Esophageal reflux 03/26/2008     Priority: Medium     Pain in joint, ankle and foot 08/21/2007     Priority: Medium     Overview:   IMO Update 10/11       Open bimalleolar fracture 08/16/2007     Priority: Medium     Myalgia and myositis 05/01/2007     Priority: Medium     Overview:   IMO Update 10/11       Other malaise and fatigue 05/01/2007     Priority: Medium        Past Medical History:      Past Medical History:   Diagnosis Date     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 02/22/2011     Depression 01/01/2011     Esophageal reflux 02/22/2011     Fibromyalgia 03/18/2005     Insomnia 8/27/2014      Irritable bowel syndrome 02/22/2011     Menopausal or female climacteric states 02/21/2007     Mitral valve disorders(424.0) 02/22/2011     Myalgia and myositis, unspecified 02/25/2008     Narcolepsy      Other affections of shoulder region, not elsewhere 10/01/2008     Other follow-up examination(V67.59) 03/18/2005     Other screening mammogram 09/29/2006     Other specified pre-operative examination 05/04/2005     Posttraumatic stress disorder 02/22/2011     Restless legs syndrome (RLS) 02/22/2011     Sebaceous cyst 08/19/2005     Tourette's disorder 02/22/2011     Ventral hernia 09/15/2011       Past Surgical History:      Past Surgical History:   Procedure Laterality Date     BILATERAL CATARACT EXTRACTION  2006 01/01/2011     Bunion Surgery > RT  2010     COLONOSCOPY  12/23/2013    Procedure: COLONOSCOPY;  COLONOSCOPY;  Surgeon: Kasia Enrique DO;  Location: [facility]     D&C  1991    01/01/2011     deviated septum repair  2012     ECHOCARDIOGRAM  2006 01/01/2011     HEMORRHOIDECTOMY  1979    01/01/2011     HYSTERECTOMY  1995    01/01/2011     LAPAROSCOPIC HERNIORRHAPHY VENTRAL  1/16/2014    Procedure: LAPAROSCOPIC HERNIORRHAPHY VENTRAL;  LAPAROSCOPIC VENTRAL HERNIA REPAIR W/ MESH;  Surgeon: Kasia Enrique DO;  Location: [facility] OR     leg repair after fx  >LT  2009     RELEASE CARPAL TUNNEL  2010    RT     TRIGGER  THUMB RELEASE  2011       Family History:      Family History   Problem Relation Age of Onset     Alcohol/Drug Father 42        Motor Vehicle Accident due to Alcoholism - Cause of Death     Diabetes Maternal Uncle      Parkinsonism Maternal Uncle      Depression Mother      Hypertension Mother      Cancer Mother         Cervical     Parkinsonism Mother      Other - See Comments Sister         ADD/ADHD     Other - See Comments Sister         Fibromyalgia     Diabetes Sister      Diabetes Sister      Other - See Comments Sister         ADD/ADHD     Other - See Comments Son         ADD/ADHD      "Other - See Comments Son         ADD/ADHD       Social History:    Marital Status:   [2]  Social History     Tobacco Use     Smoking status: Never Smoker     Smokeless tobacco: Never Used   Substance Use Topics     Alcohol use: Yes     Comment: RARELY     Drug use: No        Review of Systems   Respiratory: Positive for shortness of breath and wheezing.    Cardiovascular: Positive for palpitations.   Gastrointestinal: Positive for constipation.   Endocrine: Positive for cold intolerance.   Musculoskeletal: Positive for back pain.   All other systems reviewed and are negative.        Physical Exam   Vitals:  /72 (BP Location: Left arm, Cuff Size: Adult Regular)   Pulse 99   Temp 98  F (36.7  C) (Tympanic)   Ht 1.613 m (5' 3.5\")   Wt 71.2 kg (157 lb)   SpO2 98%   BMI 27.38 kg/m        Physical Exam  Constitutional:       Appearance: Normal appearance. She is normal weight.   Cardiovascular:      Rate and Rhythm: Normal rate and regular rhythm.      Pulses: Normal pulses.   Pulmonary:      Effort: Pulmonary effort is normal.      Breath sounds: No wheezing or rales.   Abdominal:      General: Abdomen is flat. Bowel sounds are normal.      Palpations: Abdomen is soft. There is no mass.      Hernia: There is no hernia in the right inguinal area or left inguinal area.   Genitourinary:     Exam position: Lithotomy position.      Pubic Area: No rash.       Labia:         Right: No rash.         Left: No rash.       Urethra: No prolapse, urethral pain, urethral swelling or urethral lesion.      Comments: External genitalia is normal.  Patient does have urethral descensus but I cannot get her to leak in the lithotomy position.  She has a good Keagle contraction.  No vaginal masses or pelvic masses.  External rectal area is normal normal rectal tone and no rectal masses.  Neurological:      Mental Status: She is alert.       Impression: Mixed incontinence    Plan   Plan: I talked with Chantel and explained " the etiology of both types of leakage.  It sounds like her urge incontinence is fairly well-controlled with the oxybutynin but I think we could go up to a 10 mg dose.  She will let me know if she has increased side effects from that.  A new prescription was sent to her pharmacy.  She is having some stress incontinence and the oxybutynin is not going to help with that.  We discussed treatment for the stress incontinence being physical therapy and surgical intervention.  I also talked about treatment for her urge incontinence being physical therapy, medications, Botox or InterStim.  We will try all conservative therapy before considering any other invasive procedures and she is in agreement to that plan.  I have increased her oxybutynin to 10 mg and I have referred her to physical therapy for some pelvic floor exercises.  Follow-up in 4 months      Return in about 4 months (around 6/28/2020).    Leigh Dietz MD  [facility]

## 2020-02-28 NOTE — LETTER
"2/28/2020       RE: Anya Vicente  [address]  [place] [address]     Dear Colleague,    Thank you for referring your patient, Anya Vicente, to the [facility] [place] [facility]. Please see a copy of my visit note below.      History     Chief Complaint:    Consult (1 year follow up mixed incontinence. [facility] pt)      HPI   Anya Vicente is a 69 year old female who presents with a history of mixed incontinence.  Chantel states she has been incontinent for at least 5 years but it is gotten worse in the last 2 years.  A year ago she was placed on oxybutynin XL 5 mg and that seemed to help quite a bit.  2 months ago she had a terrible upper respiratory virus and she was coughing a lot and having a lot of leakage with that.  Now that her upper respiratory infection is been taken care of that she is doing much better with her incontinence.  She does void about every 2 hours in the day and gets up 3-4 times at night.  She denies any blood, no dysuria, no urinary tract infections.  She leaks both with urgency and she leaks with coughing and sneezing.  Right now she feels the coughing and sneezing is the bigger problem.  She is using 3 medium pads a day.  She does have a history of constipation but does not seem to be affected by the oxybutynin.    Allergies:    Allergies   Allergen Reactions     Acetaminophen      Darvocet-N 100       Bupropion Other (See Comments)     Increased anxiety     Clonidine      Dropped BP Drastically     Codamine Itching and Other (See Comments)     \"Buzzy\"     Codeine      Hydrocodone      \"Patient states can take liquid med but not pill\".     Meperidine      Oxycodone-Aspirin Itching, Nausea and Vomiting and Other (See Comments)     \"Buzzy\"     Pimozide Other (See Comments)     Drowsiness     Propoxyphene Napsylate      Darvocet-N 100     Seasonal Allergies Other (See Comments)     Seasonal allergies.  Per " 7/3/07, runny nose, stuffy, plugged ears.        Medications:      amphetamine-dextroamphetamine (ADDERALL) 20 MG per tablet  buPROPion (WELLBUTRIN XL) 300 MG 24 hr tablet  Calcium Carbonate-Vit D-Min (CALCIUM 1200 PO)  docusate sodium (COLACE) 100 MG capsule  hydrochlorothiazide (HYDRODIURIL) 50 MG tablet  ORDER FOR DME  ORDER FOR DME  ORDER FOR DME  oxybutynin ER (DITROPAN-XL) 10 MG 24 hr tablet  oxybutynin ER (DITROPAN-XL) 5 MG 24 hr tablet  pramipexole (MIRAPEX) 0.5 MG tablet  predniSONE (DELTASONE) 20 MG tablet  traMADol (ULTRAM) 50 MG tablet  TURMERIC CURCUMIN PO  zolpidem (AMBIEN) 5 MG tablet  albuterol (PROAIR HFA/PROVENTIL HFA/VENTOLIN HFA) 108 (90 Base) MCG/ACT inhaler  cyclobenzaprine (FLEXERIL) 5 MG tablet  ibuprofen (ADVIL/MOTRIN) 800 MG tablet  ipratropium - albuterol 0.5 mg/2.5 mg/3 mL (DUONEB) 0.5-2.5 (3) MG/3ML neb solution        Problem List:      Patient Active Problem List    Diagnosis Date Noted     Mixed stress and urge urinary incontinence 12/26/2018     Priority: Medium     Leg cramps 12/26/2018     Priority: Medium     Vitamin D deficiency disease 12/26/2018     Priority: Medium     Major depressive disorder 01/16/2015     Priority: Medium     Insomnia 08/27/2014     Priority: Medium     Glucose intolerance (impaired glucose tolerance) 11/14/2013     Priority: Medium     Narcolepsy without 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) 07/31/2013     Priority: Medium     Advanced care planning/counseling discussion 10/10/2012     Priority: Medium     Complete rupture of rotator cuff 03/09/2009     Priority: Medium     Glucose intolerance 10/16/2008     Priority: Medium     Overview:   IMO Update 10/11       Esophageal reflux 03/26/2008     Priority: Medium     Pain in joint, ankle and foot 08/21/2007     Priority: Medium     Overview:   IMO Update 10/11       Open bimalleolar fracture 08/16/2007     Priority: Medium     Myalgia and myositis 05/01/2007     Priority: Medium     Overview:   IMO Update 10/11       Other  malaise and fatigue 05/01/2007     Priority: Medium        Past Medical History:      Past Medical History:   Diagnosis Date     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 02/22/2011     Depression 01/01/2011     Esophageal reflux 02/22/2011     Fibromyalgia 03/18/2005     Insomnia 8/27/2014     Irritable bowel syndrome 02/22/2011     Menopausal or female climacteric states 02/21/2007     Mitral valve disorders(424.0) 02/22/2011     Myalgia and myositis, unspecified 02/25/2008     Narcolepsy      Other affections of shoulder region, not elsewhere 10/01/2008     Other follow-up examination(V67.59) 03/18/2005     Other screening mammogram 09/29/2006     Other specified pre-operative examination 05/04/2005     Posttraumatic stress disorder 02/22/2011     Restless legs syndrome (RLS) 02/22/2011     Sebaceous cyst 08/19/2005     Tourette's disorder 02/22/2011     Ventral hernia 09/15/2011       Past Surgical History:      Past Surgical History:   Procedure Laterality Date     BILATERAL CATARACT EXTRACTION  2006 01/01/2011     Bunion Surgery > RT  2010     COLONOSCOPY  12/23/2013    Procedure: COLONOSCOPY;  COLONOSCOPY;  Surgeon: Kasia Enrique DO;  Location: [facility]     D&C  1991    01/01/2011     deviated septum repair  2012     ECHOCARDIOGRAM  2006 01/01/2011     HEMORRHOIDECTOMY  1979    01/01/2011     HYSTERECTOMY  1995    01/01/2011     LAPAROSCOPIC HERNIORRHAPHY VENTRAL  1/16/2014    Procedure: LAPAROSCOPIC HERNIORRHAPHY VENTRAL;  LAPAROSCOPIC VENTRAL HERNIA REPAIR W/ MESH;  Surgeon: Kasia Enrique DO;  Location: [facility]     leg repair after fx  >LT  2009     RELEASE CARPAL TUNNEL  2010    RT     TRIGGER  THUMB RELEASE  2011       Family History:      Family History   Problem Relation Age of Onset     Alcohol/Drug Father 42        Motor Vehicle Accident due to Alcoholism - Cause of Death     Diabetes Maternal Uncle      Parkinsonism Maternal Uncle      Depression Mother      Hypertension Mother       "Cancer Mother         Cervical     Parkinsonism Mother      Other - See Comments Sister         ADD/ADHD     Other - See Comments Sister         Fibromyalgia     Diabetes Sister      Diabetes Sister      Other - See Comments Sister         ADD/ADHD     Other - See Comments Son         ADD/ADHD     Other - See Comments Son         ADD/ADHD       Social History:    Marital Status:   [2]  Social History     Tobacco Use     Smoking status: Never Smoker     Smokeless tobacco: Never Used   Substance Use Topics     Alcohol use: Yes     Comment: RARELY     Drug use: No        Review of Systems   Respiratory: Positive for shortness of breath and wheezing.    Cardiovascular: Positive for palpitations.   Gastrointestinal: Positive for constipation.   Endocrine: Positive for cold intolerance.   Musculoskeletal: Positive for back pain.   All other systems reviewed and are negative.        Physical Exam   Vitals:  /72 (BP Location: Left arm, Cuff Size: Adult Regular)   Pulse 99   Temp 98  F (36.7  C) (Tympanic)   Ht 1.613 m (5' 3.5\")   Wt 71.2 kg (157 lb)   SpO2 98%   BMI 27.38 kg/m         Physical Exam  Constitutional:       Appearance: Normal appearance. She is normal weight.   Cardiovascular:      Rate and Rhythm: Normal rate and regular rhythm.      Pulses: Normal pulses.   Pulmonary:      Effort: Pulmonary effort is normal.      Breath sounds: No wheezing or rales.   Abdominal:      General: Abdomen is flat. Bowel sounds are normal.      Palpations: Abdomen is soft. There is no mass.      Hernia: There is no hernia in the right inguinal area or left inguinal area.   Genitourinary:     Exam position: Lithotomy position.      Pubic Area: No rash.       Labia:         Right: No rash.         Left: No rash.       Urethra: No prolapse, urethral pain, urethral swelling or urethral lesion.      Comments: External genitalia is normal.  Patient does have urethral descensus but I cannot get her to leak in the " lithotomy position.  She has a good Keagle contraction.  No vaginal masses or pelvic masses.  External rectal area is normal normal rectal tone and no rectal masses.  Neurological:      Mental Status: She is alert.       Impression: Mixed incontinence    Plan   Plan: I talked with Chantel and explained the etiology of both types of leakage.  It sounds like her urge incontinence is fairly well-controlled with the oxybutynin but I think we could go up to a 10 mg dose.  She will let me know if she has increased side effects from that.  A new prescription was sent to her pharmacy.  She is having some stress incontinence and the oxybutynin is not going to help with that.  We discussed treatment for the stress incontinence being physical therapy and surgical intervention.  I also talked about treatment for her urge incontinence being physical therapy, medications, Botox or InterStim.  We will try all conservative therapy before considering any other invasive procedures and she is in agreement to that plan.  I have increased her oxybutynin to 10 mg and I have referred her to physical therapy for some pelvic floor exercises.  Follow-up in 4 months      Return in about 4 months (around 6/28/2020).    Leigh Dietz MD  [facility] [place]            Again, thank you for allowing me to participate in the care of your patient.      Sincerely,    Leigh Dietz MD

## 2020-02-28 NOTE — NURSING NOTE
"Chief Complaint   Patient presents with     Consult     1 year follow up mixed incontinence. Cardona pt       Initial /72 (BP Location: Left arm, Cuff Size: Adult Regular)   Pulse 99   Temp 98  F (36.7  C) (Tympanic)   Ht 1.613 m (5' 3.5\")   Wt 71.2 kg (157 lb)   SpO2 98%   BMI 27.38 kg/m   Estimated body mass index is 27.38 kg/m  as calculated from the following:    Height as of this encounter: 1.613 m (5' 3.5\").    Weight as of this encounter: 71.2 kg (157 lb).  Medication Reconciliation: complete     Review of Systems:    Weight loss:    No     Recent fever/chills:  No   Night sweats:   No  Current skin rash:  No   Recent hair loss:  No  Heat intolerance:  No   Cold intolerance:  Yes  Chest pain:   No   Palpitations:   Yes  Shortness of breath:  Yes   Wheezing:   Yes  Constipation:    Yes   Diarrhea:   No   Nausea:   No   Vomiting:   No   Kidney/side pain:  No   Back pain:   Yes  Frequent headaches:  No   Dizziness:     No  Leg swelling:   No   Calf pain:    No    Parents, brothers or sisters with history of kidney cancer:   No  Parents, brothers or sisters with history of bladder cancer: No    Lin Bhagat LPN  "

## 2020-04-04 DIAGNOSIS — I10 ESSENTIAL HYPERTENSION: ICD-10-CM

## 2020-04-06 RX ORDER — HYDROCHLOROTHIAZIDE 50 MG/1
TABLET ORAL
Qty: 90 TABLET | Refills: 2 | Status: SHIPPED | OUTPATIENT
Start: 2020-04-06 | End: 2023-06-21

## 2020-04-06 NOTE — TELEPHONE ENCOUNTER
hydrochlorothiazide (HYDRODIURIL) 50      Last Written Prescription Date:  10/31/19  Last Fill Quantity: 90,   # refills: 2  Last Office Visit: [id number]  Future Office visit:    Next 5 appointments (look out 90 days)    Jun 11, 2020  1:30 PM CDT  (Arrive by 1:15 PM)  Return Visit with Leigh Dietz MD  [facility] [place] ([facility] ) [address]  [place] [address]  [phone]           Routing refill request to provider for review/approval because:  Medication is reported/historical

## 2020-05-04 ENCOUNTER — MYC REFILL (OUTPATIENT)
Dept: FAMILY MEDICINE | Facility: OTHER | Age: 70
End: 2020-05-04

## 2020-05-04 DIAGNOSIS — G25.81 RESTLESS LEGS SYNDROME (RLS): ICD-10-CM

## 2020-05-04 RX ORDER — ZOLPIDEM TARTRATE 5 MG/1
5 TABLET ORAL
Qty: 30 TABLET | Refills: 3 | Status: SHIPPED | OUTPATIENT
Start: 2020-05-04 | End: 2020-12-09

## 2020-05-04 NOTE — TELEPHONE ENCOUNTER
Ambien      Last Written Prescription Date:  10/31/19  Last Fill Quantity: 30,   # refills: 3  Last Office Visit: 10/31/19  Future Office visit:    Next 5 appointments (look out 90 days)    Jun 11, 2020  1:30 PM CDT  (Arrive by 1:15 PM)  Return Visit with Leigh Dietz MD  [facility] [address] [place] [address]  [phone]           Routing refill request to provider for review/approval because:  Drug not on the [facility], [facility] or  Health refill protocol or controlled substance

## 2020-05-05 ENCOUNTER — HOSPITAL ENCOUNTER (OUTPATIENT)
Dept: PHYSICAL THERAPY | Facility: HOSPITAL | Age: 70
Setting detail: THERAPIES SERIES
End: 2020-05-05
Attending: UROLOGY
Payer: MEDICARE

## 2020-05-05 PROCEDURE — 97162 PT EVAL MOD COMPLEX 30 MIN: CPT | Mod: GP

## 2020-05-05 PROCEDURE — 97530 THERAPEUTIC ACTIVITIES: CPT | Mod: GP

## 2020-05-05 NOTE — PROGRESS NOTES
05/05/20 1300   General Information   Type of Visit Initial OP Ortho PT Evaluation   Start of Care Date 05/05/20   Referring Physician Dr Dietz   Orders Evaluate and Treat   Orders Comment urinary stress and urge incontinence   Date of Order 02/28/20   Certification Required? Yes   Medical Diagnosis stress incontinence, constipation   Surgical/Medical history reviewed Yes   Precautions/Limitations no known precautions/limitations   Body Part(s)   Body Part(s) Pelvic Floor Dysfunction   Presentation and Etiology   Pertinent history of current problem (include personal factors and/or comorbidities that impact the POC) This 70 y/o female has had 3-5 year history of mixed UI. Pt became sick with RSV this past January and was continually coughing. UI became worse- Was having to use constant pad ond/t UI. DOwn to 1 pad per day as ditropan dose incrd.  Pt also has constipation/IBS- takes 400 mg stool softener. Hysterectomy 1995. Hemorrhoidectomy. Pt had quick labor/deliveries.  Pt s/p abdominal hernia with mesh 2018. Hx of fibromyalgia, LBP, arthritis., plantar fascitis- limiting exercise ability in past year.    Current Level of Function   Current Community Support Family/friend caregiver   Patient role/employment history F. Retired   Living environment [place]   Current equipment-Gait/Locomotion None   Fall Risk Screen   Have you fallen 2 or more times in the past year? No   Have you fallen and had an injury in the past year? No   Is patient a fall risk? No   Pelvic Floor Dysfunction Questions   Regular exercise No   Fluid intake-glasses/day (one glass/cup = 8oz 24 ounces   Caffeinated beverages-glasses/day 1   Alcoholic beverages - glasses/day 3x/week-    Recent diet change? No   How long can you delay the need to urinate?  one hour   How many times do you wake to urinate at night?   2-3x/night    How often do you urinate during the day?   4-5   Can you stop the flow of urine when on the toilet?  No   Is the  "volume of urine passed usually  Average   Do you have the sensation that you need to go to the toilet?  Yes   Do you empty your bladder frequently, before you experience the urge to pass urine?  Yes   Do you have \"triggers\" that make you feel you can't wait to go to the toilet?  Yes   Number of bladder infections last year?  0   Frequency of bowel movements:  once daily.   Consistency of stool?  Other   Do you ignore the urge to defecate?  Other  (ocassionally)   Women's Health Questions   Number of pregnancies  4   Number of vaginal deliveries  4   Weight of largest baby  8pounds 15 ounces   Number of episiotomies  4   Pelvic Floor Dysfunction Objective Findings   Pain-pelvic dysfunction   (PERF- score: 2/4/6/3)   Observation urethral descensus, no signif prolapse in supine but diffic testing as pt cautious of urinating with bearing down   Areas of Tightness None   Abdominal Wall   (will assess hernia/mesh next visit)   Type of Storage Problem nocturia;mixed   Type of Emptying Problem postvoid dribbling;constipation   Protection needed Pad;Number of pads per day   Power (MMT at Levator Ani) 2   Endurance (Up to 10 seconds as long as still 50% power) 4   Repetitions (Contract 10 seconds or MVC, rest 4 seconds and count max number of reps) 4   Fast Twitch (Number of 1 second contractions can do in 10 seconds. Norm=7 reps) 6   Elevation (Able to lift posterior vaginal wall toward head and public bone) Absent   Medications Stool softener;Meds for stress incontinence   Meds for stress incontinence ditropan   Number of pads 2   Planned Therapy Interventions   Planned Therapy Interventions manual therapy;strengthening;stretching;neuromuscular re-education   Planned Therapy Interventions Comment HEP   Planned Modality Interventions   Planned Modality Interventions Biofeedback;Electrical stimulation   Planned Modality Interventions Comments Strength training/neuromusc re educ   Clinical Impression   Criteria for Skilled " "Therapeutic Interventions Met yes, treatment indicated   PT Diagnosis mixed incont, constipation   Influenced by the following impairments mixed incontinence, ugency, frequency, nocturia, constipation   Functional limitations due to impairments adls, exercise, leisure   Clinical Presentation Evolving/Changing   Clinical Presentation Rationale clinical judgement   Clinical Decision Making (Complexity) Moderate complexity   Therapy Frequency   (1x/weekly)   Predicted Duration of Therapy Intervention (days/wks) 12 weeks   Risk & Benefits of therapy have been explained Yes   Patient, Family & other staff in agreement with plan of care Yes   Clinical Impression Comments Pt will benefit from pelvic based PT including biofeedback, estim, exercises , HEP and education re- diet, bladder retraining.    Education Assessment   Barriers to Learning No barriers   ORTHO GOALS   PT Ortho Eval Goals 1;2;3   Ortho Goal 1   Goal Identifier STG `1   Goal Description Pt will demonstrate indep HEP compliance   Target Date 06/02/20   Ortho Goal 2   Goal Identifier STG 2   Goal Description Pt will improve PFM strength by 1 grade for reducing UI as evidenced by reducing pads to 1 or less per day.     Target Date 06/30/20   Ortho Goal 3   Goal Identifier LTG   Goal Description ADLS and exercise will not be limited by UI or constipation. Baxter stool chart rating consistently \"4\".    Target Date 07/28/20   Total Evaluation Time   PT Eval, Moderate Complexity Minutes (86634) 35   Therapy Certification   Certification date from 05/05/20   Certification date to 07/28/20   Medical Diagnosis Mixed incontinence, constipation     "

## 2020-05-06 ENCOUNTER — TELEPHONE (OUTPATIENT)
Dept: FAMILY MEDICINE | Facility: OTHER | Age: 70
End: 2020-05-06

## 2020-05-06 DIAGNOSIS — M72.2 PLANTAR FASCIITIS: Primary | ICD-10-CM

## 2020-05-06 NOTE — TELEPHONE ENCOUNTER
Not sure who Rebeca Strong is?  PT would do this. I know that [facility] is open. She can call and make an appointment.

## 2020-05-06 NOTE — TELEPHONE ENCOUNTER
10:29 AM    Reason for Call: Phone Call    Description: Chantel is interested in doing ultrasound therapy for her Plantars Fascitis . Is this something Cindy can set up for her ? Please call Chantel to advise.     Was an appointment offered for this call? No    Preferred method for responding to this message: [phone]    If we cannot reach you directly, may we leave a detailed response at the number you provided? Yes      Rebeca Strong

## 2020-05-06 NOTE — TELEPHONE ENCOUNTER
Patient called and states that our PT department does ultrasound therapy for plantar fascitis. Would like orders placed. Patient spoke to Chrissy at PT about this.

## 2020-05-06 NOTE — TELEPHONE ENCOUNTER
Left message that she is able to call [facility] to see if they would schedule her an appointment.

## 2020-05-07 ENCOUNTER — TELEPHONE (OUTPATIENT)
Dept: FAMILY MEDICINE | Facility: OTHER | Age: 70
End: 2020-05-07

## 2020-05-07 DIAGNOSIS — F33.41 RECURRENT MAJOR DEPRESSIVE DISORDER, IN PARTIAL REMISSION (H): ICD-10-CM

## 2020-05-07 DIAGNOSIS — M79.10 MYALGIA: Primary | ICD-10-CM

## 2020-05-07 NOTE — TELEPHONE ENCOUNTER
Pt requesting DME orders.  Pt dropped off copies of previous DME orders for Alpha stim 100 supplies to be copied into chart.  Medicare needs new Rx to reimburse pt for expenses.

## 2020-05-22 ENCOUNTER — HOSPITAL ENCOUNTER (OUTPATIENT)
Dept: PHYSICAL THERAPY | Facility: HOSPITAL | Age: 70
Setting detail: THERAPIES SERIES
End: 2020-05-22
Attending: UROLOGY
Payer: MEDICARE

## 2020-05-22 PROCEDURE — 97110 THERAPEUTIC EXERCISES: CPT | Mod: GP

## 2020-05-22 PROCEDURE — 97530 THERAPEUTIC ACTIVITIES: CPT | Mod: GP

## 2020-05-27 ENCOUNTER — HOSPITAL ENCOUNTER (OUTPATIENT)
Dept: PHYSICAL THERAPY | Facility: HOSPITAL | Age: 70
Setting detail: THERAPIES SERIES
End: 2020-05-27
Attending: PHYSICIAN ASSISTANT
Payer: MEDICARE

## 2020-05-27 PROCEDURE — 97161 PT EVAL LOW COMPLEX 20 MIN: CPT | Mod: GP

## 2020-05-27 PROCEDURE — 97035 APP MDLTY 1+ULTRASOUND EA 15: CPT | Mod: GP

## 2020-05-27 PROCEDURE — 97110 THERAPEUTIC EXERCISES: CPT | Mod: GP

## 2020-05-27 NOTE — PROGRESS NOTES
Initial Physical Therapy Evaluation      Name: Anya Vicente MRN# [id number]   Age: 70 year old YOB: 1950     Date of Consultation: May 27, 2020  Primary care provider: Cindy Danielle    Referring Physician: Cindy Danielle  Orders: Eval and Treat  Medical Diagnosis: Plantar Fascitis  Onset of Illness/Injury: 5/27/20     Reason for PT Visit: Patient is a 71 y/o female,who presents with  B plantar fascitis worse on L compared to R.  [place] tried orthotics, bracing, and cortisone injections that have all been unsuccessful so far.  Has been doing calf and hamstring stretches with use of a band.  Unable to sleep with the brace at night, can be too uncomfortable.    Prior Level of Function: Patient can walk about as far as around the grocery store.  Patient walking a block pain starts to get worse.     Pain: 10/10  Aching, Sharp and Shooting    Community Support/Living Environment/Employment History: Retired      Patient/Family Goal: To be more active walk more     Fall Screen:   Have you fallen 2 or more times in the last year? No  Have you fallen and had an injury in the last year? No  Timed up & go: n/a  Is patient a fall risk? No    Past Medical History:   Past Medical History:   Diagnosis Date     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 02/22/2011     Depression 01/01/2011     Esophageal reflux 02/22/2011     Fibromyalgia 03/18/2005     Insomnia 8/27/2014     Irritable bowel syndrome 02/22/2011    has mild prolapse last echo 9/06     Menopausal or female climacteric states 02/21/2007     Mitral valve disorders(424.0) 02/22/2011    has mild prolapse last echo 9/06     Myalgia and myositis, unspecified 02/25/2008     Narcolepsy      Other affections of shoulder region, not elsewhere 10/01/2008     Other follow-up examination(V67.59) 03/18/2005     Other screening mammogram 09/29/2006     Other specified pre-operative examination 05/04/2005     Posttraumatic stress disorder  02/22/2011     Restless legs syndrome (RLS) 02/22/2011     Sebaceous cyst 08/19/2005     Tourette's disorder 02/22/2011     Ventral hernia 09/15/2011       Past Surgical History:  Past Surgical History:   Procedure Laterality Date     BILATERAL CATARACT EXTRACTION  2006 01/01/2011     Bunion Surgery > RT  2010     COLONOSCOPY  12/23/2013    Procedure: COLONOSCOPY;  COLONOSCOPY;  Surgeon: Kasia Enrique DO;  Location: [facility]     D&C  1991    01/01/2011     deviated septum repair  2012     ECHOCARDIOGRAM  2006    01/01/2011     HEMORRHOIDECTOMY  1979    01/01/2011     HYSTERECTOMY  1995    01/01/2011     LAPAROSCOPIC HERNIORRHAPHY VENTRAL  1/16/2014    Procedure: LAPAROSCOPIC HERNIORRHAPHY VENTRAL;  LAPAROSCOPIC VENTRAL HERNIA REPAIR W/ MESH;  Surgeon: Kasia Enrique DO;  Location: [facility] OR     leg repair after fx  >LT  2009     RELEASE CARPAL TUNNEL  2010    RT     TRIGGER  THUMB RELEASE  2011       Medications:   Current Outpatient Medications   Medication Sig     albuterol (PROAIR HFA/PROVENTIL HFA/VENTOLIN HFA) 108 (90 Base) MCG/ACT inhaler Inhale 2 puffs into the lungs every 6 hours (Patient not taking: Reported on 2/28/2020)     amphetamine-dextroamphetamine (ADDERALL) 20 MG per tablet Take 2 tablets in the morning and 1 tablet at noon (Patient taking differently: 20 mg Take 2 tablets in the morning and 1 tablet at noon)     buPROPion (WELLBUTRIN XL) 300 MG 24 hr tablet Take 1 tablet (300 mg) by mouth every morning     Calcium Carbonate-Vit D-Min (CALCIUM 1200 PO) Take 1,200 mg by mouth daily      cyclobenzaprine (FLEXERIL) 5 MG tablet Take 1 tablet (5 mg) by mouth 3 times daily as needed for muscle spasms (Patient not taking: Reported on 2/28/2020)     docusate sodium (COLACE) 100 MG capsule Take 100 mg by mouth daily Take 4 capsules daily     hydrochlorothiazide (HYDRODIURIL) 50 MG tablet TAKE 1 TABLET DAILY BY MOUTH AS NEEDED     ibuprofen (ADVIL/MOTRIN) 800 MG tablet Take 1 tablet (800 mg) by mouth  every 6 hours as needed for moderate pain (Patient not taking: Reported on 2/28/2020)     ipratropium - albuterol 0.5 mg/2.5 mg/3 mL (DUONEB) 0.5-2.5 (3) MG/3ML neb solution Take 1 vial (3 mLs) by nebulization every 6 hours as needed for shortness of breath / dyspnea or wheezing (Patient not taking: Reported on 2/28/2020)     ORDER FOR DME Equipment being ordered: silver sneakers.     ORDER FOR DME Equipment being ordered: thoracic/lumbrosacral support.     ORDER FOR DME Equipment being ordered: knee sleeve     oxybutynin ER (DITROPAN-XL) 10 MG 24 hr tablet Take 1 tablet (10 mg) by mouth daily     oxybutynin ER (DITROPAN-XL) 5 MG 24 hr tablet Take 1 tablet (5 mg) by mouth daily     pramipexole (MIRAPEX) 0.5 MG tablet TAKE 1 TABLET BY MOUTH AT BEDTIME - GENERIC FOR MIRAPEX     predniSONE (DELTASONE) 20 MG tablet Take 20 mg by mouth as needed     traMADol (ULTRAM) 50 MG tablet TAKE 1 TO 2 TABLETS BY MOUTH EVERY 6 HOURS AS NEEDED FOR MODERATE P AIN     TURMERIC CURCUMIN PO Take 500 mg by mouth daily     zolpidem (AMBIEN) 5 MG tablet Take 1 tablet (5 mg) by mouth nightly as needed for sleep     Current Facility-Administered Medications   Medication     albuterol (PROAIR HFA/PROVENTIL HFA/VENTOLIN HFA) 108 (90 Base) MCG/ACT inhaler 2 puff       Imaging:     Musculoskeletal Findings:     OBJECTIVE   Observation:   Patient appears to PT in no acute distress      Palpation: tenderness over heel and plantar fascia      Gait: Normal      Assistive devices:        Balance: Good      Functional mobility: Ambulates independently      Posture: Normal erect.      Neurological Assessment:  Reflexes - Right:    Achilles -  2+  Patellar - 2+       Reflexes - Left:    Achilles - 2+  Patellar - 2+    Myotomes: neg  Dermatomes: neg    Ankle Range of Motion  And Strength    R ankle - Active ROM              Dorsiflexion        WNL         Plantarflexion    WNL         Inversion             WNL         Eversion               WNL              Strength: (_/5)    Dorsiflexion    5/5  Plantarflexion 5/5  Inversion        5/5  Eversion        5/5       L ankle - Active ROM    Dorsiflexion: WNL                         Plantaflexion: WNL  Inversion: WNL                         Eversion: WNL                         Strength: (_/5)    Dorsiflexion    5/5  Plantarflexion 5/5  Inversion        5/5  Eversion        5/5    Great toe extension ROM: WNL    Rearfoot mobility assessment:     Special Tests:  Ankle Special Tests:     Talar Tilt:   neg         Yenifer s: neg                        Anterior Drawer: neg                              Camejo: neg                    Squeeze Test: neg                  External Rotation Stress Test: neg                        Hip Strength and ROM: WNL      Outcome Measures:   LEFS 16     Prognosis/Plan of Care: Patient has a good prognosis for the following goals   Appropriate for Physical Therapy Intervention: Yes     GOALS:   Short-term goals:  To be achieved in 4 weeks:    1.) Patient will report 20% improvement of overall symptoms to allow safe return to PLOF  2.) Patient will increase active active heel raises to 3x20 daily.   3.) Patient will tolerate walking up to 1 block.        Long-term goals:  To be achieved in 12 weeks:  1.) Patient will reduce pain level to 2/10 to allow increased ability to stand and perform daily activities with less pain  2.) Patient will report 50% improvement of overall symptoms and pain in ankle to allow increased ability to perform daily activities with reduced pain and irritation.  3.) Patient will be able to walk a distance up to 1 mile.        Discharge goals: Independent with use of HEP outside of clinic      Planned Interventions: Therapeutic exercise, manual therapy, therapeutic activity, patient education    Patient presents today with signs and symptoms consistent of plantar fascitis w/ potential nerve involvment on L side potentially due to previous history of plates and screws  in the L LE.  Will very likely benefit from consistent HEP for strengthening of foot intrinsics soft tissue work. Therapy today consisted of evaluation, patient education, and therapeutic exercise. Patient would benefit from continued PT sessions addressing overall pain and dysfunction with use of appropriate interventions.      Clinical Impressions:  Criteria for Skilled Therapeutic Intervention Met: yes  PT Diagnosis: Plantar Fascitis B  Influenced by the following impairments: increased foot pain, decreased ambulation  Functional limitations due to impairment: walking, squatting, standing extended  Clinical presentation: Stable/Uncomplicated  Clinical presentation rationale: Therapist Discretion  Clinical Decision making (complexity): Low Complexity  Predicted Duration of Therapy Intervention (days/wks): 2x  Risks and Benefits of therapy have been explained: Yes  Patient, Family & other staff in agreement with plan of care: Yes  Comments: Ultrasound per request   Frequency: 2x times/week  Date Range: 5/28/20 to 8/26/20        Total Evaluation Time: 15

## 2020-06-01 ENCOUNTER — HOSPITAL ENCOUNTER (OUTPATIENT)
Dept: PHYSICAL THERAPY | Facility: HOSPITAL | Age: 70
Setting detail: THERAPIES SERIES
End: 2020-06-01
Attending: PHYSICIAN ASSISTANT
Payer: MEDICARE

## 2020-06-01 PROCEDURE — 97140 MANUAL THERAPY 1/> REGIONS: CPT | Mod: GP

## 2020-06-01 PROCEDURE — 97110 THERAPEUTIC EXERCISES: CPT | Mod: GP

## 2020-06-02 ENCOUNTER — VIRTUAL VISIT (OUTPATIENT)
Dept: UROLOGY | Facility: OTHER | Age: 70
End: 2020-06-02
Attending: UROLOGY
Payer: COMMERCIAL

## 2020-06-02 DIAGNOSIS — N39.46 MIXED STRESS AND URGE URINARY INCONTINENCE: Primary | ICD-10-CM

## 2020-06-02 PROCEDURE — 99212 OFFICE O/P EST SF 10 MIN: CPT | Mod: TEL | Performed by: UROLOGY

## 2020-06-02 RX ORDER — DEXTROAMPHETAMINE SACCHARATE, AMPHETAMINE ASPARTATE, DEXTROAMPHETAMINE SULFATE AND AMPHETAMINE SULFATE 5; 5; 5; 5 MG/1; MG/1; MG/1; MG/1
TABLET ORAL
COMMUNITY
Start: 2020-05-20 | End: 2023-06-21

## 2020-06-02 NOTE — PROGRESS NOTES
"  History     Chief Complaint:    Telephone (4 month follow up )      HPI   Anya Vicente is a 70 year old female whom I spoke with today on the phone regarding follow-up of her incontinence.  Chantel was seen in February 2020 and had what I felt was mixed incontinence.  She was experiencing some upper respiratory problems with her asthma and was coughing a fair amount and she said that that settled down now so she is not having as much stress incontinence.  We increased her oxybutynin to 10 mg extended release and she says that that seems to be working well.  She does have a bit of a dry mouth but it is tolerable.  She started on her physical therapy and she has had 2 visits so far and she feels that it is helping.  Right now she is currently not wearing any pads.  The physical therapist has had her working with a vaginal probe to help her pelvic floor strength.  Overall Chantel is happy with where she is at at this time.    Allergies:    Allergies   Allergen Reactions     Acetaminophen      Darvocet-N 100       Bupropion Other (See Comments)     Increased anxiety     Clonidine      Dropped BP Drastically     Codamine Itching and Other (See Comments)     \"Buzzy\"     Codeine      Hydrocodone      \"Patient states can take liquid med but not pill\".     Meperidine      Oxycodone-Aspirin Itching, Nausea and Vomiting and Other (See Comments)     \"Buzzy\"     Pimozide Other (See Comments)     Drowsiness     Propoxyphene Napsylate      Darvocet-N 100     Seasonal Allergies Other (See Comments)     Seasonal allergies.  Per 7/3/07, runny nose, stuffy, plugged ears.        Medications:      amphetamine-dextroamphetamine (ADDERALL) 20 MG tablet  buPROPion (WELLBUTRIN XL) 300 MG 24 hr tablet  Calcium Carbonate-Vit D-Min (CALCIUM 1200 PO)  docusate sodium (COLACE) 100 MG capsule  ORDER FOR DME  ORDER FOR DME  ORDER FOR DME  oxybutynin ER (DITROPAN-XL) 10 MG 24 hr tablet  pramipexole (MIRAPEX) 0.5 MG tablet  traMADol " (ULTRAM) 50 MG tablet  zolpidem (AMBIEN) 5 MG tablet  albuterol (PROAIR HFA/PROVENTIL HFA/VENTOLIN HFA) 108 (90 Base) MCG/ACT inhaler  amphetamine-dextroamphetamine (ADDERALL) 20 MG per tablet  cyclobenzaprine (FLEXERIL) 5 MG tablet  hydrochlorothiazide (HYDRODIURIL) 50 MG tablet  ipratropium - albuterol 0.5 mg/2.5 mg/3 mL (DUONEB) 0.5-2.5 (3) MG/3ML neb solution  predniSONE (DELTASONE) 20 MG tablet        Problem List:      Patient Active Problem List    Diagnosis Date Noted     Mixed stress and urge urinary incontinence 12/26/2018     Priority: Medium     Leg cramps 12/26/2018     Priority: Medium     Vitamin D deficiency disease 12/26/2018     Priority: Medium     Major depressive disorder 01/16/2015     Priority: Medium     Insomnia 08/27/2014     Priority: Medium     Glucose intolerance (impaired glucose tolerance) 11/14/2013     Priority: Medium     Narcolepsy without 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) 07/31/2013     Priority: Medium     Advanced care planning/counseling discussion 10/10/2012     Priority: Medium     Complete rupture of rotator cuff 03/09/2009     Priority: Medium     Glucose intolerance 10/16/2008     Priority: Medium     Overview:   IMO Update 10/11       Esophageal reflux 03/26/2008     Priority: Medium     Pain in joint, ankle and foot 08/21/2007     Priority: Medium     Overview:   IMO Update 10/11       Open bimalleolar fracture 08/16/2007     Priority: Medium     Myalgia and myositis 05/01/2007     Priority: Medium     Overview:   IMO Update 10/11       Other malaise and fatigue 05/01/2007     Priority: Medium        Past Medical History:      Past Medical History:   Diagnosis Date     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 02/22/2011     Depression 01/01/2011     Esophageal reflux 02/22/2011     Fibromyalgia 03/18/2005     Insomnia 8/27/2014     Irritable bowel syndrome 02/22/2011     Menopausal or female climacteric states 02/21/2007     Mitral valve disorders(424.0) 02/22/2011     Myalgia  and myositis, unspecified 02/25/2008     Narcolepsy      Other affections of shoulder region, not elsewhere 10/01/2008     Other follow-up examination(V67.59) 03/18/2005     Other screening mammogram 09/29/2006     Other specified pre-operative examination 05/04/2005     Posttraumatic stress disorder 02/22/2011     Restless legs syndrome (RLS) 02/22/2011     Sebaceous cyst 08/19/2005     Tourette's disorder 02/22/2011     Ventral hernia 09/15/2011       Past Surgical History:      Past Surgical History:   Procedure Laterality Date     BILATERAL CATARACT EXTRACTION  2006 01/01/2011     Bunion Surgery > RT  2010     COLONOSCOPY  12/23/2013    Procedure: COLONOSCOPY;  COLONOSCOPY;  Surgeon: Kasia Enrique DO;  Location: [facility]     D&C  1991    01/01/2011     deviated septum repair  2012     ECHOCARDIOGRAM  2006 01/01/2011     HEMORRHOIDECTOMY  1979    01/01/2011     HYSTERECTOMY  1995    01/01/2011     LAPAROSCOPIC HERNIORRHAPHY VENTRAL  1/16/2014    Procedure: LAPAROSCOPIC HERNIORRHAPHY VENTRAL;  LAPAROSCOPIC VENTRAL HERNIA REPAIR W/ MESH;  Surgeon: Kasia Enrique DO;  Location: [facility] OR     leg repair after fx  >LT  2009     RELEASE CARPAL TUNNEL  2010    RT     TRIGGER  THUMB RELEASE  2011       Family History:      Family History   Problem Relation Age of Onset     Alcohol/Drug Father 42        Motor Vehicle Accident due to Alcoholism - Cause of Death     Diabetes Maternal Uncle      Parkinsonism Maternal Uncle      Depression Mother      Hypertension Mother      Cancer Mother         Cervical     Parkinsonism Mother      Other - See Comments Sister         ADD/ADHD     Other - See Comments Sister         Fibromyalgia     Diabetes Sister      Diabetes Sister      Other - See Comments Sister         ADD/ADHD     Other - See Comments Son         ADD/ADHD     Other - See Comments Son         ADD/ADHD       Social History:    Marital Status:   [2]  Social History     Tobacco Use     Smoking status: Never  Smoker     Smokeless tobacco: Never Used   Substance Use Topics     Alcohol use: Yes     Comment: RARELY     Drug use: No        Review of Systems      Physical Exam   Vitals:  There were no vitals taken for this visit.      Physical Exam     Impression: Mixed incontinence  Impression: Mixed incontinence which is improved  Plan   Plan: Chantel seems to be doing well with her medication and physical therapy at this time.  Currently she says she is getting by without using any pads.  She says she always takes 1 with her but it sounds like she has had some nice improvement.  I did talk to her about stopping her oxybutynin at some point if she feels the physical therapy has helped her considerably and she is going to do that.  If her incontinence gets worse then she will go back on her medication.  I think it at least warrants a trial off of the medication for these patients.  We will see Chantel in follow-up in February, sooner if she has problems.  11 minutes spent in this phone consultation.      No follow-ups on file.    Leigh Dietz MD  [facility]

## 2020-06-02 NOTE — LETTER
"6/2/2020       RE: Anya Vicente  [address]  [place] [address]     Dear Colleague,    Thank you for referring your patient, Anya Vicente, to the [facility] [place] [facility]. Please see a copy of my visit note below.    Anya Vicente is a 70 year old female who is being evaluated via a billable telephone visit.      The patient has been notified of following:     \"This telephone visit will be conducted via a call between you and your physician/provider. We have found that certain health care needs can be provided without the need for a physical exam.  This service lets us provide the care you need with a short phone conversation.  If a prescription is necessary we can send it directly to your pharmacy.  If lab work is needed we can place an order for that and you can then stop by our lab to have the test done at a later time.    Telephone visits are billed at different rates depending on your insurance coverage. During this emergency period, for some insurers they may be billed the same as an in-person visit.  Please reach out to your insurance provider with any questions.    If during the course of the call the physician/provider feels a telephone visit is not appropriate, you will not be charged for this service.\"    Patient has given verbal consent for Telephone visit?  Yes    What phone number would you like to be contacted at? [phone]    How would you like to obtain your AVS? [facility]    Phone call duration: 15 minutes    Luisa Beavres LPN          History     Chief Complaint:    Telephone (4 month follow up )      HPI   Anya Vicente is a 70 year old female whom I spoke with today on the phone regarding follow-up of her incontinence.  Chantel was seen in February 2020 and had what I felt was mixed incontinence.  She was experiencing some upper respiratory problems with her asthma and was coughing a " "fair amount and she said that that settled down now so she is not having as much stress incontinence.  We increased her oxybutynin to 10 mg extended release and she says that that seems to be working well.  She does have a bit of a dry mouth but it is tolerable.  She started on her physical therapy and she has had 2 visits so far and she feels that it is helping.  Right now she is currently not wearing any pads.  The physical therapist has had her working with a vaginal probe to help her pelvic floor strength.  Overall Chantel is happy with where she is at at this time.    Allergies:    Allergies   Allergen Reactions     Acetaminophen      Darvocet-N 100       Bupropion Other (See Comments)     Increased anxiety     Clonidine      Dropped BP Drastically     Codamine Itching and Other (See Comments)     \"Buzzy\"     Codeine      Hydrocodone      \"Patient states can take liquid med but not pill\".     Meperidine      Oxycodone-Aspirin Itching, Nausea and Vomiting and Other (See Comments)     \"Buzzy\"     Pimozide Other (See Comments)     Drowsiness     Propoxyphene Napsylate      Darvocet-N 100     Seasonal Allergies Other (See Comments)     Seasonal allergies.  Per 7/3/07, runny nose, stuffy, plugged ears.        Medications:      amphetamine-dextroamphetamine (ADDERALL) 20 MG tablet  buPROPion (WELLBUTRIN XL) 300 MG 24 hr tablet  Calcium Carbonate-Vit D-Min (CALCIUM 1200 PO)  docusate sodium (COLACE) 100 MG capsule  ORDER FOR DME  ORDER FOR DME  ORDER FOR DME  oxybutynin ER (DITROPAN-XL) 10 MG 24 hr tablet  pramipexole (MIRAPEX) 0.5 MG tablet  traMADol (ULTRAM) 50 MG tablet  zolpidem (AMBIEN) 5 MG tablet  albuterol (PROAIR HFA/PROVENTIL HFA/VENTOLIN HFA) 108 (90 Base) MCG/ACT inhaler  amphetamine-dextroamphetamine (ADDERALL) 20 MG per tablet  cyclobenzaprine (FLEXERIL) 5 MG tablet  hydrochlorothiazide (HYDRODIURIL) 50 MG tablet  ipratropium - albuterol 0.5 mg/2.5 mg/3 mL (DUONEB) 0.5-2.5 (3) MG/3ML neb " solution  predniSONE (DELTASONE) 20 MG tablet        Problem List:      Patient Active Problem List    Diagnosis Date Noted     Mixed stress and urge urinary incontinence 12/26/2018     Priority: Medium     Leg cramps 12/26/2018     Priority: Medium     Vitamin D deficiency disease 12/26/2018     Priority: Medium     Major depressive disorder 01/16/2015     Priority: Medium     Insomnia 08/27/2014     Priority: Medium     Glucose intolerance (impaired glucose tolerance) 11/14/2013     Priority: Medium     Narcolepsy without 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) 07/31/2013     Priority: Medium     Advanced care planning/counseling discussion 10/10/2012     Priority: Medium     Complete rupture of rotator cuff 03/09/2009     Priority: Medium     Glucose intolerance 10/16/2008     Priority: Medium     Overview:   IMO Update 10/11       Esophageal reflux 03/26/2008     Priority: Medium     Pain in joint, ankle and foot 08/21/2007     Priority: Medium     Overview:   IMO Update 10/11       Open bimalleolar fracture 08/16/2007     Priority: Medium     Myalgia and myositis 05/01/2007     Priority: Medium     Overview:   IMO Update 10/11       Other malaise and fatigue 05/01/2007     Priority: Medium        Past Medical History:      Past Medical History:   Diagnosis Date     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 02/22/2011     Depression 01/01/2011     Esophageal reflux 02/22/2011     Fibromyalgia 03/18/2005     Insomnia 8/27/2014     Irritable bowel syndrome 02/22/2011     Menopausal or female climacteric states 02/21/2007     Mitral valve disorders(424.0) 02/22/2011     Myalgia and myositis, unspecified 02/25/2008     Narcolepsy      Other affections of shoulder region, not elsewhere 10/01/2008     Other follow-up examination(V67.59) 03/18/2005     Other screening mammogram 09/29/2006     Other specified pre-operative examination 05/04/2005     Posttraumatic stress disorder 02/22/2011     Restless legs syndrome (RLS) 02/22/2011      Sebaceous cyst 08/19/2005     Tourette's disorder 02/22/2011     Ventral hernia 09/15/2011       Past Surgical History:      Past Surgical History:   Procedure Laterality Date     BILATERAL CATARACT EXTRACTION  2006 01/01/2011     Bunion Surgery > RT  2010     COLONOSCOPY  12/23/2013    Procedure: COLONOSCOPY;  COLONOSCOPY;  Surgeon: Kasia Enrique DO;  Location: [facility]     D&C  1991    01/01/2011     deviated septum repair  2012     ECHOCARDIOGRAM  2006 01/01/2011     HEMORRHOIDECTOMY  1979    01/01/2011     HYSTERECTOMY  1995    01/01/2011     LAPAROSCOPIC HERNIORRHAPHY VENTRAL  1/16/2014    Procedure: LAPAROSCOPIC HERNIORRHAPHY VENTRAL;  LAPAROSCOPIC VENTRAL HERNIA REPAIR W/ MESH;  Surgeon: Kasia Enrique DO;  Location: [facility] OR     leg repair after fx  >LT  2009     RELEASE CARPAL TUNNEL  2010    RT     TRIGGER  THUMB RELEASE  2011       Family History:      Family History   Problem Relation Age of Onset     Alcohol/Drug Father 42        Motor Vehicle Accident due to Alcoholism - Cause of Death     Diabetes Maternal Uncle      Parkinsonism Maternal Uncle      Depression Mother      Hypertension Mother      Cancer Mother         Cervical     Parkinsonism Mother      Other - See Comments Sister         ADD/ADHD     Other - See Comments Sister         Fibromyalgia     Diabetes Sister      Diabetes Sister      Other - See Comments Sister         ADD/ADHD     Other - See Comments Son         ADD/ADHD     Other - See Comments Son         ADD/ADHD       Social History:    Marital Status:   [2]  Social History     Tobacco Use     Smoking status: Never Smoker     Smokeless tobacco: Never Used   Substance Use Topics     Alcohol use: Yes     Comment: RARELY     Drug use: No        Review of Systems      Physical Exam   Vitals:  There were no vitals taken for this visit.      Physical Exam     Impression: Mixed incontinence  Impression: Mixed incontinence which is improved  Plan   Plan: Chantel seems to be  doing well with her medication and physical therapy at this time.  Currently she says she is getting by without using any pads.  She says she always takes 1 with her but it sounds like she has had some nice improvement.  I did talk to her about stopping her oxybutynin at some point if she feels the physical therapy has helped her considerably and she is going to do that.  If her incontinence gets worse then she will go back on her medication.  I think it at least warrants a trial off of the medication for these patients.  We will see Chantel in follow-up in February, sooner if she has problems.  11 minutes spent in this phone consultation.      No follow-ups on file.    Leigh Dietz MD  [facility] [place]            Again, thank you for allowing me to participate in the care of your patient.      Sincerely,    Leigh Dietz MD

## 2020-06-02 NOTE — PROGRESS NOTES
"Anya Vicente is a 70 year old female who is being evaluated via a billable telephone visit.      The patient has been notified of following:     \"This telephone visit will be conducted via a call between you and your physician/provider. We have found that certain health care needs can be provided without the need for a physical exam.  This service lets us provide the care you need with a short phone conversation.  If a prescription is necessary we can send it directly to your pharmacy.  If lab work is needed we can place an order for that and you can then stop by our lab to have the test done at a later time.    Telephone visits are billed at different rates depending on your insurance coverage. During this emergency period, for some insurers they may be billed the same as an in-person visit.  Please reach out to your insurance provider with any questions.    If during the course of the call the physician/provider feels a telephone visit is not appropriate, you will not be charged for this service.\"    Patient has given verbal consent for Telephone visit?  Yes    What phone number would you like to be contacted at? [phone]    How would you like to obtain your AVS? [facility]    Phone call duration: 15 minutes    Luisa Beavers LPN      "

## 2020-06-04 ENCOUNTER — HOSPITAL ENCOUNTER (OUTPATIENT)
Dept: PHYSICAL THERAPY | Facility: HOSPITAL | Age: 70
Setting detail: THERAPIES SERIES
End: 2020-06-04
Attending: PHYSICIAN ASSISTANT
Payer: MEDICARE

## 2020-06-04 ENCOUNTER — HOSPITAL ENCOUNTER (OUTPATIENT)
Dept: PHYSICAL THERAPY | Facility: HOSPITAL | Age: 70
Setting detail: THERAPIES SERIES
End: 2020-06-04
Attending: UROLOGY
Payer: MEDICARE

## 2020-06-04 PROCEDURE — 97110 THERAPEUTIC EXERCISES: CPT | Mod: GP

## 2020-06-04 PROCEDURE — 97035 APP MDLTY 1+ULTRASOUND EA 15: CPT | Mod: GP

## 2020-06-04 PROCEDURE — 97140 MANUAL THERAPY 1/> REGIONS: CPT | Mod: GP

## 2020-06-08 ENCOUNTER — HOSPITAL ENCOUNTER (OUTPATIENT)
Dept: PHYSICAL THERAPY | Facility: HOSPITAL | Age: 70
Setting detail: THERAPIES SERIES
End: 2020-06-08
Attending: PHYSICIAN ASSISTANT
Payer: MEDICARE

## 2020-06-08 PROCEDURE — 97035 APP MDLTY 1+ULTRASOUND EA 15: CPT | Mod: GP

## 2020-06-08 PROCEDURE — 97140 MANUAL THERAPY 1/> REGIONS: CPT | Mod: GP

## 2020-06-12 ENCOUNTER — HOSPITAL ENCOUNTER (OUTPATIENT)
Dept: PHYSICAL THERAPY | Facility: HOSPITAL | Age: 70
Setting detail: THERAPIES SERIES
End: 2020-06-12
Attending: PHYSICIAN ASSISTANT
Payer: MEDICARE

## 2020-06-12 PROCEDURE — 97140 MANUAL THERAPY 1/> REGIONS: CPT | Mod: GP

## 2020-06-12 PROCEDURE — 97035 APP MDLTY 1+ULTRASOUND EA 15: CPT | Mod: GP

## 2020-06-18 ENCOUNTER — HOSPITAL ENCOUNTER (OUTPATIENT)
Dept: PHYSICAL THERAPY | Facility: HOSPITAL | Age: 70
Setting detail: THERAPIES SERIES
End: 2020-06-18
Attending: UROLOGY
Payer: MEDICARE

## 2020-06-18 PROCEDURE — 97110 THERAPEUTIC EXERCISES: CPT | Mod: GP

## 2020-06-22 ENCOUNTER — HOSPITAL ENCOUNTER (OUTPATIENT)
Dept: PHYSICAL THERAPY | Facility: HOSPITAL | Age: 70
Setting detail: THERAPIES SERIES
End: 2020-06-22
Attending: PHYSICIAN ASSISTANT
Payer: MEDICARE

## 2020-06-22 PROCEDURE — 97110 THERAPEUTIC EXERCISES: CPT | Mod: GP

## 2020-06-24 NOTE — PROGRESS NOTES
Subjective     Anya Vicente is a 70 year old female who presents to clinic today for the following health issues:    HPI   Joint Pain    Onset: ongoing 3 years     Description:   Location: bilat feet pain   Character: Stabbing and pins and needles     Intensity: moderate    Progression of Symptoms: worse    Accompanying Signs & Symptoms:  Other symptoms: swelling    History:   Previous similar pain: YES      Precipitating factors:   Trauma or overuse: YES , walking     Alleviating factors:   stretching, exercises   Therapies Tried and outcome: heat, ice, stretching, exercises and orthotics , alpha stimulator , ankle cuffs , socks , cortisone injection . Has hardware in foot.         Patient Active Problem List   Diagnosis     Narcolepsy without 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)     Advanced care planning/counseling discussion     Glucose intolerance (impaired glucose tolerance)     Insomnia     Major depressive disorder     Mixed stress and urge urinary incontinence     Leg cramps     Vitamin D deficiency disease     Complete rupture of rotator cuff     Esophageal reflux     Glucose intolerance     Myalgia and myositis     Open bimalleolar fracture     Other malaise and fatigue     Pain in joint, ankle and foot     Fibromyalgia     Past Surgical History:   Procedure Laterality Date     BILATERAL CATARACT EXTRACTION  2006 01/01/2011     Bunion Surgery > RT  2010     COLONOSCOPY  12/23/2013    Procedure: COLONOSCOPY;  COLONOSCOPY;  Surgeon: Kasia Enrique DO;  Location: [facility]     D&C  1991 01/01/2011     deviated septum repair  2012     ECHOCARDIOGRAM  2006 01/01/2011     HEMORRHOIDECTOMY  1979    01/01/2011     HYSTERECTOMY  1995 01/01/2011     LAPAROSCOPIC HERNIORRHAPHY VENTRAL  1/16/2014    Procedure: LAPAROSCOPIC HERNIORRHAPHY VENTRAL;  LAPAROSCOPIC VENTRAL HERNIA REPAIR W/ MESH;  Surgeon: Kasia Enrique DO;  Location: [facility] OR     leg repair after fx  >LT  2009     RELEASE CARPAL TUNNEL  2010    RT      TRIGGER  THUMB RELEASE  2011       Social History     Tobacco Use     Smoking status: Never Smoker     Smokeless tobacco: Never Used   Substance Use Topics     Alcohol use: Yes     Comment: RARELY     Family History   Problem Relation Age of Onset     Alcohol/Drug Father 42        Motor Vehicle Accident due to Alcoholism - Cause of Death     Diabetes Maternal Uncle      Parkinsonism Maternal Uncle      Depression Mother      Hypertension Mother      Cancer Mother         Cervical     Parkinsonism Mother      Other - See Comments Sister         ADD/ADHD     Other - See Comments Sister         Fibromyalgia     Diabetes Sister      Diabetes Sister      Other - See Comments Sister         ADD/ADHD     Other - See Comments Son         ADD/ADHD     Other - See Comments Son         ADD/ADHD         Current Outpatient Medications   Medication Sig Dispense Refill     albuterol (PROAIR HFA/PROVENTIL HFA/VENTOLIN HFA) 108 (90 Base) MCG/ACT inhaler Inhale 2 puffs into the lungs every 6 hours 1 Inhaler 3     amphetamine-dextroamphetamine (ADDERALL) 20 MG tablet TAKE 2 TABLETS BY MOUTH IN THE MORNING.  To avoid insomnia, last daily dose should be taken no less than 6 hours before bed.       buPROPion (WELLBUTRIN XL) 300 MG 24 hr tablet Take 1 tablet (300 mg) by mouth every morning 90 tablet 3     Calcium Carbonate-Vit D-Min (CALCIUM 1200 PO) Take 1,200 mg by mouth daily        cholecalciferol (VITAMIN D3) 1250 mcg (22644 units) capsule        cyclobenzaprine (FLEXERIL) 5 MG tablet Take 1 tablet (5 mg) by mouth 3 times daily as needed for muscle spasms 90 tablet 3     docusate sodium (COLACE) 100 MG capsule Take 100 mg by mouth daily Take 4 capsules daily       hydrochlorothiazide (HYDRODIURIL) 50 MG tablet TAKE 1 TABLET DAILY BY MOUTH AS NEEDED 90 tablet 2     Magnesium Oxide 500 MG CAPS        ORDER FOR DME Equipment being ordered: silver sneakers. 1 Act 0     ORDER FOR DME Equipment being ordered: thoracic/lumbrosacral support.  "1 Device 1     ORDER FOR DME Equipment being ordered: knee sleeve 1 Device 6     oxybutynin ER (DITROPAN-XL) 10 MG 24 hr tablet Take 1 tablet (10 mg) by mouth daily 30 tablet 11     pramipexole (MIRAPEX) 0.5 MG tablet TAKE 1 TABLET BY MOUTH AT BEDTIME - GENERIC FOR MIRAPEX 90 tablet 3     predniSONE (DELTASONE) 20 MG tablet Take 20 mg by mouth as needed       traMADol (ULTRAM) 50 MG tablet TAKE 1 TO 2 TABLETS BY MOUTH EVERY 6 HOURS AS NEEDED FOR MODERATE P AIN 60 tablet 0     zolpidem (AMBIEN) 5 MG tablet Take 1 tablet (5 mg) by mouth nightly as needed for sleep 30 tablet 3     ipratropium - albuterol 0.5 mg/2.5 mg/3 mL (DUONEB) 0.5-2.5 (3) MG/3ML neb solution Take 1 vial (3 mLs) by nebulization every 6 hours as needed for shortness of breath / dyspnea or wheezing (Patient not taking: Reported on 2/28/2020) 75 mL 0     Allergies   Allergen Reactions     Acetaminophen      Darvocet-N 100       Bupropion Other (See Comments)     Increased anxiety     Clonidine      Dropped BP Drastically     Codamine Itching and Other (See Comments)     \"Buzzy\"     Codeine      Hydrocodone      \"Patient states can take liquid med but not pill\".     Lyrica Headache, Itching and Swelling     Meperidine      Oxycodone-Aspirin Itching, Nausea and Vomiting and Other (See Comments)     \"Buzzy\"     Pimozide Other (See Comments)     Drowsiness     Propoxyphene Napsylate      Darvocet-N 100     Seasonal Allergies Other (See Comments)     Seasonal allergies.  Per 7/3/07, runny nose, stuffy, plugged ears.     Recent Labs   Lab Test 02/11/20  0032 10/31/19  1105 12/26/18  1507 07/31/18  0914   A1C  --  6.0* 5.9* 5.8*   LDL  --   --   --  91   HDL  --   --   --  85   TRIG  --   --   --  51   ALT 18 18 22  --    CR 0.72 0.80 0.90 0.81   GFRESTIMATED 84 74 64 70   GFRESTBLACK >90 86 74 85   POTASSIUM 3.8 4.4 3.9 4.0   TSH  --   --  1.25 1.67      BP Readings from Last 3 Encounters:   06/25/20 (!) 140/72   02/28/20 128/72   02/11/20 149/78    Wt " "Readings from Last 3 Encounters:   06/25/20 73.3 kg (161 lb 9.6 oz)   02/28/20 71.2 kg (157 lb)   10/31/19 71.2 kg (157 lb)                    Reviewed and updated as needed this visit by Provider         Review of Systems   Constitutional, HEENT, cardiovascular, pulmonary, gi and gu systems are negative, except as otherwise noted.      Objective    BP (!) 140/72 (BP Location: Left arm, Patient Position: Chair, Cuff Size: Adult Regular)   Pulse 104   Temp 98  F (36.7  C) (Tympanic)   Ht 1.6 m (5' 3\")   Wt 73.3 kg (161 lb 9.6 oz)   SpO2 96%   BMI 28.63 kg/m    Body mass index is 28.63 kg/m .  Physical Exam   GENERAL: healthy, alert and no distress  EYES: Eyes grossly normal to inspection, PERRL and conjunctivae and sclerae normal  HENT: ear canals and TM's normal, nose and mouth without ulcers or lesions  NECK: no adenopathy, no asymmetry, masses, or scars and thyroid normal to palpation  RESP: lungs clear to auscultation - no rales, rhonchi or wheezes  CV: regular rate and rhythm, normal S1 S2, no S3 or S4, no murmur, click or rub, no peripheral edema and peripheral pulses strong  MS: no gross musculoskeletal defects noted, no edema  SKIN: no suspicious lesions or rashes  NEURO: Normal strength and tone, mentation intact and speech normal  BACK: no CVA tenderness, no paralumbar tenderness    Diagnostic Test Results:  Labs reviewed in Epic  No results found for this or any previous visit (from the past 24 hour(s)).        Assessment & Plan     1. Arthritis of left foot  She is in need of below has been in PT for plantar fasciitis and not getting much better on right . Pins and needles. Hardware is in place.   - XR FOOT LEFT 2 VIEWS (Clinic Performed); Future  - CT Foot Left w/o Contrast; Future    2. Encounter for hepatitis C screening test for low risk patient  Due for this. Worked in health care.   - Hepatitis C Screen Reflex to HCV RNA Quant and Genotype    3. Chronic bilateral low back pain with left-sided " "sciatica  She is going to be having xray. Offered medication for radicualr sx. Afraid for weight gain.   - XR LUMBAR SPINE 2/3 VIEWS (Clinic Performed); Future    4. Glucose intolerance (impaired glucose tolerance)  She is going to be going.   - Hemoglobin A1c  - CBC with platelets differential  - Comprehensive metabolic panel     BMI:   Estimated body mass index is 28.63 kg/m  as calculated from the following:    Height as of this encounter: 1.6 m (5' 3\").    Weight as of this encounter: 73.3 kg (161 lb 9.6 oz).           See Patient Instructions    No follow-ups on file.    RADHAMES Bowman  [facility] [place]    "

## 2020-06-25 ENCOUNTER — ANCILLARY PROCEDURE (OUTPATIENT)
Dept: GENERAL RADIOLOGY | Facility: OTHER | Age: 70
End: 2020-06-25
Attending: PHYSICIAN ASSISTANT
Payer: MEDICARE

## 2020-06-25 ENCOUNTER — OFFICE VISIT (OUTPATIENT)
Dept: FAMILY MEDICINE | Facility: OTHER | Age: 70
End: 2020-06-25
Attending: PHYSICIAN ASSISTANT
Payer: MEDICARE

## 2020-06-25 VITALS
TEMPERATURE: 98 F | HEART RATE: 104 BPM | BODY MASS INDEX: 28.63 KG/M2 | HEIGHT: 63 IN | DIASTOLIC BLOOD PRESSURE: 72 MMHG | OXYGEN SATURATION: 96 % | SYSTOLIC BLOOD PRESSURE: 140 MMHG | WEIGHT: 161.6 LBS

## 2020-06-25 DIAGNOSIS — Z11.59 ENCOUNTER FOR HEPATITIS C SCREENING TEST FOR LOW RISK PATIENT: ICD-10-CM

## 2020-06-25 DIAGNOSIS — M54.42 CHRONIC BILATERAL LOW BACK PAIN WITH LEFT-SIDED SCIATICA: ICD-10-CM

## 2020-06-25 DIAGNOSIS — G89.29 CHRONIC BILATERAL LOW BACK PAIN WITH LEFT-SIDED SCIATICA: ICD-10-CM

## 2020-06-25 DIAGNOSIS — M19.072 ARTHRITIS OF LEFT FOOT: ICD-10-CM

## 2020-06-25 DIAGNOSIS — M19.072 ARTHRITIS OF LEFT FOOT: Primary | ICD-10-CM

## 2020-06-25 DIAGNOSIS — R73.02 GLUCOSE INTOLERANCE (IMPAIRED GLUCOSE TOLERANCE): ICD-10-CM

## 2020-06-25 LAB
ALBUMIN SERPL-MCNC: 3.8 G/DL (ref 3.4–5)
ALP SERPL-CCNC: 88 U/L (ref 40–150)
ALT SERPL W P-5'-P-CCNC: 23 U/L (ref 0–50)
ANION GAP SERPL CALCULATED.3IONS-SCNC: 1 MMOL/L (ref 3–14)
AST SERPL W P-5'-P-CCNC: 14 U/L (ref 0–45)
BASOPHILS # BLD AUTO: 0.1 10E9/L (ref 0–0.2)
BASOPHILS NFR BLD AUTO: 1.3 %
BILIRUB SERPL-MCNC: 0.4 MG/DL (ref 0.2–1.3)
BUN SERPL-MCNC: 20 MG/DL (ref 7–30)
CALCIUM SERPL-MCNC: 9.3 MG/DL (ref 8.5–10.1)
CHLORIDE SERPL-SCNC: 104 MMOL/L (ref 94–109)
CO2 SERPL-SCNC: 32 MMOL/L (ref 20–32)
CREAT SERPL-MCNC: 0.98 MG/DL (ref 0.52–1.04)
DIFFERENTIAL METHOD BLD: NORMAL
EOSINOPHIL # BLD AUTO: 0.1 10E9/L (ref 0–0.7)
EOSINOPHIL NFR BLD AUTO: 2.1 %
ERYTHROCYTE [DISTWIDTH] IN BLOOD BY AUTOMATED COUNT: 12.1 % (ref 10–15)
EST. AVERAGE GLUCOSE BLD GHB EST-MCNC: 111 MG/DL
GFR SERPL CREATININE-BSD FRML MDRD: 57 ML/MIN/{1.73_M2}
GLUCOSE SERPL-MCNC: 114 MG/DL (ref 70–99)
HBA1C MFR BLD: 5.5 % (ref 0–5.6)
HCT VFR BLD AUTO: 41.4 % (ref 35–47)
HGB BLD-MCNC: 14.2 G/DL (ref 11.7–15.7)
IMM GRANULOCYTES # BLD: 0 10E9/L (ref 0–0.4)
IMM GRANULOCYTES NFR BLD: 0.2 %
LYMPHOCYTES # BLD AUTO: 1.9 10E9/L (ref 0.8–5.3)
LYMPHOCYTES NFR BLD AUTO: 36.2 %
MCH RBC QN AUTO: 31.1 PG (ref 26.5–33)
MCHC RBC AUTO-ENTMCNC: 34.3 G/DL (ref 31.5–36.5)
MCV RBC AUTO: 91 FL (ref 78–100)
MONOCYTES # BLD AUTO: 0.5 10E9/L (ref 0–1.3)
MONOCYTES NFR BLD AUTO: 9.1 %
NEUTROPHILS # BLD AUTO: 2.7 10E9/L (ref 1.6–8.3)
NEUTROPHILS NFR BLD AUTO: 51.1 %
NRBC # BLD AUTO: 0 10*3/UL
NRBC BLD AUTO-RTO: 0 /100
PLATELET # BLD AUTO: 340 10E9/L (ref 150–450)
POTASSIUM SERPL-SCNC: 3.9 MMOL/L (ref 3.4–5.3)
PROT SERPL-MCNC: 7.9 G/DL (ref 6.8–8.8)
RBC # BLD AUTO: 4.57 10E12/L (ref 3.8–5.2)
SODIUM SERPL-SCNC: 137 MMOL/L (ref 133–144)
WBC # BLD AUTO: 5.4 10E9/L (ref 4–11)

## 2020-06-25 PROCEDURE — 80053 COMPREHEN METABOLIC PANEL: CPT | Mod: ZL | Performed by: PHYSICIAN ASSISTANT

## 2020-06-25 PROCEDURE — G0463 HOSPITAL OUTPT CLINIC VISIT: HCPCS | Mod: 25

## 2020-06-25 PROCEDURE — 90715 TDAP VACCINE 7 YRS/> IM: CPT | Mod: GY

## 2020-06-25 PROCEDURE — 40000788 ZZHCL STATISTIC ESTIMATED AVERAGE GLUCOSE: Mod: ZL | Performed by: PHYSICIAN ASSISTANT

## 2020-06-25 PROCEDURE — 83036 HEMOGLOBIN GLYCOSYLATED A1C: CPT | Mod: ZL | Performed by: PHYSICIAN ASSISTANT

## 2020-06-25 PROCEDURE — 90471 IMMUNIZATION ADMIN: CPT | Mod: GY

## 2020-06-25 PROCEDURE — G0463 HOSPITAL OUTPT CLINIC VISIT: HCPCS

## 2020-06-25 PROCEDURE — 36415 COLL VENOUS BLD VENIPUNCTURE: CPT | Mod: ZL | Performed by: PHYSICIAN ASSISTANT

## 2020-06-25 PROCEDURE — 99214 OFFICE O/P EST MOD 30 MIN: CPT | Performed by: PHYSICIAN ASSISTANT

## 2020-06-25 PROCEDURE — 85025 COMPLETE CBC W/AUTO DIFF WBC: CPT | Mod: ZL | Performed by: PHYSICIAN ASSISTANT

## 2020-06-25 PROCEDURE — 73630 X-RAY EXAM OF FOOT: CPT | Mod: TC,LT

## 2020-06-25 PROCEDURE — 72100 X-RAY EXAM L-S SPINE 2/3 VWS: CPT | Mod: TC

## 2020-06-25 RX ORDER — VITAMIN E (DL,TOCOPHERYL ACET) 180 MG
CAPSULE ORAL
COMMUNITY
Start: 2020-06-01 | End: 2022-09-30

## 2020-06-25 RX ORDER — CHOLECALCIFEROL (VITAMIN D3) 1250 MCG
CAPSULE ORAL
COMMUNITY
Start: 2016-01-10

## 2020-06-25 ASSESSMENT — ANXIETY QUESTIONNAIRES
GAD7 TOTAL SCORE: 1
4. TROUBLE RELAXING: NOT AT ALL
1. FEELING NERVOUS, ANXIOUS, OR ON EDGE: NOT AT ALL
3. WORRYING TOO MUCH ABOUT DIFFERENT THINGS: SEVERAL DAYS
IF YOU CHECKED OFF ANY PROBLEMS ON THIS QUESTIONNAIRE, HOW DIFFICULT HAVE THESE PROBLEMS MADE IT FOR YOU TO DO YOUR WORK, TAKE CARE OF THINGS AT HOME, OR GET ALONG WITH OTHER PEOPLE: NOT DIFFICULT AT ALL
7. FEELING AFRAID AS IF SOMETHING AWFUL MIGHT HAPPEN: NOT AT ALL
6. BECOMING EASILY ANNOYED OR IRRITABLE: NOT AT ALL
2. NOT BEING ABLE TO STOP OR CONTROL WORRYING: NOT AT ALL
5. BEING SO RESTLESS THAT IT IS HARD TO SIT STILL: NOT AT ALL

## 2020-06-25 ASSESSMENT — PATIENT HEALTH QUESTIONNAIRE - PHQ9: SUM OF ALL RESPONSES TO PHQ QUESTIONS 1-9: 4

## 2020-06-25 ASSESSMENT — ASTHMA QUESTIONNAIRES
QUESTION_5 LAST FOUR WEEKS HOW WOULD YOU RATE YOUR ASTHMA CONTROL: WELL CONTROLLED
QUESTION_4 LAST FOUR WEEKS HOW OFTEN HAVE YOU USED YOUR RESCUE INHALER OR NEBULIZER MEDICATION (SUCH AS ALBUTEROL): ONCE A WEEK OR LESS
QUESTION_3 LAST FOUR WEEKS HOW OFTEN DID YOUR ASTHMA SYMPTOMS (WHEEZING, COUGHING, SHORTNESS OF BREATH, CHEST TIGHTNESS OR PAIN) WAKE YOU UP AT NIGHT OR EARLIER THAN USUAL IN THE MORNING: NOT AT ALL
ACT_TOTALSCORE: 21
ACUTE_EXACERBATION_TODAY: NO
QUESTION_1 LAST FOUR WEEKS HOW MUCH OF THE TIME DID YOUR ASTHMA KEEP YOU FROM GETTING AS MUCH DONE AT WORK, SCHOOL OR AT HOME: A LITTLE OF THE TIME
QUESTION_2 LAST FOUR WEEKS HOW OFTEN HAVE YOU HAD SHORTNESS OF BREATH: ONCE OR TWICE A WEEK
EMERGENCY_ROOM_LAST_YEAR_TOTAL: ONE

## 2020-06-25 ASSESSMENT — PAIN SCALES - GENERAL: PAINLEVEL: MILD PAIN (2)

## 2020-06-25 ASSESSMENT — MIFFLIN-ST. JEOR: SCORE: 1212.14

## 2020-06-25 NOTE — PATIENT INSTRUCTIONS
Thank you for choosing [facility].   I have office hours 8:00 am to 4:30 pm on Monday's, Wednesday's, Thursday's and Friday's. My nurse and I are out of the office every Tuesday.    Following your visit, when your labs and diagnostic testing have returned, I will review then and you will be contacted by my nurse.  If you are on My Chart, you can also view results there.    For refills, notify your pharmacy regarding what you need and the pharmacy will generate a refill request. Do not call my nurse as she is unable to process refill request. Please plan ahead and allow 3-5 days for refill requests.    You will generally receive a reminder call the day prior to your appointment.  If you cannot attend your appointment, please cancel your appointment with as much notice as possible.  If there is a pattern of failure to present for your appointments, I cannot provide consistent, meaningful, ongoing care for you. It is very important to me that you come in for your care, so we can best assist you with your health care needs.    IMPORTANT:  Please note that it is my standard of practice to NOT participate in prescribing ongoing requested Narcotic Analgesic therapy, and/or participate in the prescribing of other controlled substances.  My nurse and I am happy to assist you with the process of referral for alternative pain management as needed, and other treatment modalities including but not limited to:  Physical Therapy, Physical Medicine and Rehab, Counseling, Chiropractic Care, Orthopedic Care, and non-narcotic medication management.     In the event that you need to be seen for emergent concerns and I am out of office,  please see one of my colleagues for acute concerns.  You may also present to  or ER.  I appreciate the opportunity to serve you and look forward to supporting your healthcare needs in the future. Please contact me with any questions or concerns that you may  have.    Sincerely,      Cindy Danielle RN, PA-C

## 2020-06-25 NOTE — PROGRESS NOTES
Outpatient Physical Therapy Discharge Note     Patient: Anya Vicente  : 1950    Beginning/End Dates of Reporting Period:  20 to 2020    Referring Provider: Cindy Nick Diagnosis: Plantar Fascitis     Client Self Report: Patient presents for chronic plantar fascitis pain 2 years.    Objective Measurements:      Outcome Measures (most recent score):      Goals:  All goals met    Plan:      Discharge:  Discharge from PT

## 2020-06-26 ASSESSMENT — ANXIETY QUESTIONNAIRES: GAD7 TOTAL SCORE: 1

## 2020-06-26 ASSESSMENT — ASTHMA QUESTIONNAIRES: ACT_TOTALSCORE: 21

## 2020-06-29 ENCOUNTER — HOSPITAL ENCOUNTER (OUTPATIENT)
Dept: CT IMAGING | Facility: HOSPITAL | Age: 70
Discharge: HOME | End: 2020-06-29
Attending: PHYSICIAN ASSISTANT | Admitting: PHYSICIAN ASSISTANT
Payer: MEDICARE

## 2020-06-29 DIAGNOSIS — M19.072 ARTHRITIS OF LEFT FOOT: ICD-10-CM

## 2020-06-29 PROCEDURE — 73700 CT LOWER EXTREMITY W/O DYE: CPT | Mod: TC,LT

## 2020-06-30 ENCOUNTER — MYC MEDICAL ADVICE (OUTPATIENT)
Dept: FAMILY MEDICINE | Facility: OTHER | Age: 70
End: 2020-06-30

## 2020-06-30 DIAGNOSIS — M79.672 LEFT FOOT PAIN: Primary | ICD-10-CM

## 2020-07-01 ENCOUNTER — DOCUMENTATION ONLY (OUTPATIENT)
Dept: OTHER | Facility: CLINIC | Age: 70
End: 2020-07-01

## 2020-07-02 DIAGNOSIS — J45.901 MILD ASTHMA WITH ACUTE EXACERBATION, UNSPECIFIED WHETHER PERSISTENT: Primary | ICD-10-CM

## 2020-07-02 NOTE — TELEPHONE ENCOUNTER
Dayan      Last Written Prescription Date:  02/11/20  Last Fill Quantity: 75ml,   # refills: 0  Last Office Visit: 06/25/20  Future Office visit:       Routing refill request to provider for review/approval because:

## 2020-07-08 RX ORDER — IPRATROPIUM BROMIDE AND ALBUTEROL SULFATE 2.5; .5 MG/3ML; MG/3ML
SOLUTION RESPIRATORY (INHALATION)
Qty: 90 ML | Refills: 0 | Status: SHIPPED | OUTPATIENT
Start: 2020-07-08 | End: 2022-01-17

## 2020-07-15 ENCOUNTER — HOSPITAL ENCOUNTER (OUTPATIENT)
Dept: MRI IMAGING | Facility: HOSPITAL | Age: 70
Discharge: HOME | End: 2020-07-15
Attending: PHYSICIAN ASSISTANT | Admitting: PHYSICIAN ASSISTANT
Payer: MEDICARE

## 2020-07-15 DIAGNOSIS — M79.672 LEFT FOOT PAIN: ICD-10-CM

## 2020-07-15 PROCEDURE — 73718 MRI LOWER EXTREMITY W/O DYE: CPT | Mod: TC,LT

## 2020-07-20 DIAGNOSIS — J98.01 BRONCHOSPASM: ICD-10-CM

## 2020-07-21 ENCOUNTER — TELEPHONE (OUTPATIENT)
Dept: FAMILY MEDICINE | Facility: OTHER | Age: 70
End: 2020-07-21

## 2020-07-21 ENCOUNTER — OFFICE VISIT (OUTPATIENT)
Dept: PODIATRY | Facility: OTHER | Age: 70
End: 2020-07-21
Attending: PHYSICIAN ASSISTANT
Payer: COMMERCIAL

## 2020-07-21 VITALS
HEIGHT: 64 IN | SYSTOLIC BLOOD PRESSURE: 148 MMHG | BODY MASS INDEX: 28.17 KG/M2 | OXYGEN SATURATION: 98 % | WEIGHT: 165 LBS | HEART RATE: 100 BPM | DIASTOLIC BLOOD PRESSURE: 86 MMHG | TEMPERATURE: 96.1 F

## 2020-07-21 DIAGNOSIS — I83.12 VARICOSE VEINS OF BOTH LOWER EXTREMITIES WITH INFLAMMATION: ICD-10-CM

## 2020-07-21 DIAGNOSIS — I83.11 VARICOSE VEINS OF BOTH LOWER EXTREMITIES WITH INFLAMMATION: ICD-10-CM

## 2020-07-21 DIAGNOSIS — M72.2 PLANTAR FASCIAL FIBROMATOSIS: ICD-10-CM

## 2020-07-21 DIAGNOSIS — M79.7 FIBROMYALGIA: ICD-10-CM

## 2020-07-21 DIAGNOSIS — M79.672 LEFT FOOT PAIN: ICD-10-CM

## 2020-07-21 DIAGNOSIS — M79.609 TRIGGER POINT OF EXTREMITY: Primary | ICD-10-CM

## 2020-07-21 PROCEDURE — 20553 NJX 1/MLT TRIGGER POINTS 3/>: CPT | Performed by: PODIATRIST

## 2020-07-21 PROCEDURE — 99203 OFFICE O/P NEW LOW 30 MIN: CPT | Mod: 25 | Performed by: PODIATRIST

## 2020-07-21 PROCEDURE — G0463 HOSPITAL OUTPT CLINIC VISIT: HCPCS

## 2020-07-21 RX ORDER — KETOROLAC TROMETHAMINE 30 MG/ML
60 INJECTION, SOLUTION INTRAMUSCULAR; INTRAVENOUS ONCE
Status: COMPLETED | OUTPATIENT
Start: 2020-07-21 | End: 2020-07-21

## 2020-07-21 RX ADMIN — KETOROLAC TROMETHAMINE 60 MG: 30 INJECTION, SOLUTION INTRAMUSCULAR at 16:34

## 2020-07-21 ASSESSMENT — PAIN SCALES - GENERAL
PAINLEVEL: MILD PAIN (3)
PAINLEVEL: EXTREME PAIN (8)

## 2020-07-21 ASSESSMENT — MIFFLIN-ST. JEOR: SCORE: 1235.5

## 2020-07-21 NOTE — PROGRESS NOTES
Clinic Administered Medication Documentation      Injectable Medication Documentation    Patient was given Ketorolac Tromethamine (Toradol). Prior to medication administration, verified patients identity using patient s name and date of birth. Please see MAR and medication order for additional information. Patient instructed to report any adverse reaction to staff immediately .      Was entire vial of medication used? Yes  Vial/Syringe: Single dose vial  Expiration Date:  01/2022  Was this medication supplied by the patient? No

## 2020-07-21 NOTE — PROGRESS NOTES
"Chief complaint: Patient presents with:  Musculoskeletal Problem      History of Present Illness: This 70 year old female with fibromyalgia is seen at the request of Lolis for evaluation and suggestions of management of LEFT foot pain. The pain has been progressively worsening for the past few years since around 2017. First step pain, especially in the morning, causes the worst pain. She went through physical therapy and she completed this around early July, 2020. She also had ultrasound of the heel which helped a bit, but it hurt a lot for the final session. She has an alpha stimulator which has been helping.    She has noticed an increase in edema both feet and ankles. She wears compression socks during the winter which helps a lot with pain, but she does not wear them in the summer because they are too hot.    She went to Dr. Schmitz for the past three years and she has had new CMOs for the past three years. She has been wearing jayro tennis shoes as well. Sometimes they help, and sometimes they do not. She also received plastic inserts and soft inserts, but the firm ones seem to work better.     She has had injections in the LEFT heel with the most recent injections from last fall of 2019. She has never had injections in the RIGHT foot. She has also tried night splints. Surgery was recently offered, but she would like to exhaust conservative treatment options if possible.    Historically, she broke her ankle in 2007 with an ORIF.    She also complains of a newer tingling and burning sensation in the RIGHT dorsal digits and the and in the medial aspect of her LEFT foot.     No further pedal complaints today.     Patient does not use tobacco products.         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)     Glucose intolerance (impaired glucose tolerance)     Insomnia     Major depressive disorder     Mixed stress and urge urinary incontinence     Leg cramps     Vitamin D deficiency disease     Complete rupture of rotator cuff     Esophageal reflux     Glucose intolerance     Myalgia and myositis     Open bimalleolar fracture     Other malaise and fatigue     Pain in joint, ankle and foot     Fibromyalgia       Past Surgical History:   Procedure Laterality Date     BILATERAL CATARACT EXTRACTION  2006 01/01/2011     Bunion Surgery > RT  2010     COLONOSCOPY  12/23/2013    Procedure: COLONOSCOPY;  COLONOSCOPY;  Surgeon: Kasia Enrique DO;  Location: [facility]     D&C  1991 01/01/2011     deviated septum repair  2012     ECHOCARDIOGRAM  2006 01/01/2011     HEMORRHOIDECTOMY  1979    01/01/2011     HYSTERECTOMY  1995    01/01/2011     LAPAROSCOPIC HERNIORRHAPHY VENTRAL  1/16/2014    Procedure: LAPAROSCOPIC HERNIORRHAPHY VENTRAL;  LAPAROSCOPIC VENTRAL HERNIA REPAIR W/ MESH;  Surgeon: Kasia Enrique DO;  Location: [facility] OR     leg repair after fx  >LT  2009     RELEASE CARPAL TUNNEL  2010    RT     TRIGGER  THUMB RELEASE  2011       Current Outpatient Medications   Medication     albuterol (PROAIR HFA/PROVENTIL HFA/VENTOLIN HFA) 108 (90 Base) MCG/ACT inhaler     amphetamine-dextroamphetamine (ADDERALL) 20 MG tablet     buPROPion (WELLBUTRIN XL) 300 MG 24 hr tablet     Calcium Carbonate-Vit D-Min (CALCIUM 1200 PO)     cholecalciferol (VITAMIN D3) 1250 mcg (26628 units) capsule     cyclobenzaprine (FLEXERIL) 5 MG tablet     docusate sodium (COLACE) 100 MG capsule     hydrochlorothiazide (HYDRODIURIL) 50 MG tablet     ipratropium - albuterol 0.5 mg/2.5 mg/3 mL (DUONEB) 0.5-2.5 (3) MG/3ML neb solution     Magnesium Oxide 500 MG CAPS     ORDER FOR DME     ORDER FOR DME     ORDER FOR DME     oxybutynin ER (DITROPAN-XL) 10 MG 24 hr tablet     pramipexole (MIRAPEX) 0.5  "MG tablet     predniSONE (DELTASONE) 20 MG tablet     traMADol (ULTRAM) 50 MG tablet     zolpidem (AMBIEN) 5 MG tablet     Current Facility-Administered Medications   Medication     albuterol (PROAIR HFA/PROVENTIL HFA/VENTOLIN HFA) 108 (90 Base) MCG/ACT inhaler 2 puff          Allergies   Allergen Reactions     Acetaminophen      Darvocet-N 100       Bupropion Other (See Comments)     Increased anxiety     Clonidine      Dropped BP Drastically     Codamine Itching and Other (See Comments)     \"Buzzy\"     Codeine      Hydrocodone      \"Patient states can take liquid med but not pill\".     Lyrica Headache, Itching and Swelling     Meperidine      Oxycodone-Aspirin Itching, Nausea and Vomiting and Other (See Comments)     \"Buzzy\"     Pimozide Other (See Comments)     Drowsiness     Propoxyphene Napsylate      Darvocet-N 100     Seasonal Allergies Other (See Comments)     Seasonal allergies.  Per 7/3/07, runny nose, stuffy, plugged ears.       Family History   Problem Relation Age of Onset     Alcohol/Drug Father 42        Motor Vehicle Accident due to Alcoholism - Cause of Death     Diabetes Maternal Uncle      Parkinsonism Maternal Uncle      Depression Mother      Hypertension Mother      Cancer Mother         Cervical     Parkinsonism Mother      Other - See Comments Sister         ADD/ADHD     Other - See Comments Sister         Fibromyalgia     Diabetes Sister      Diabetes Sister      Other - See Comments Sister         ADD/ADHD     Other - See Comments Son         ADD/ADHD     Other - See Comments Son         ADD/ADHD       Social History     Socioeconomic History     Marital status:      Spouse name: None     Number of children: None     Years of education: None     Highest education level: None   Occupational History     Occupation: RN - MENTAL HEALTH     Employer: RETIRED   Social Needs     Financial resource strain: None     Food insecurity     Worry: None     Inability: None     Transportation needs "     Medical: None     Non-medical: None   Tobacco Use     Smoking status: Never Smoker     Smokeless tobacco: Never Used   Substance and Sexual Activity     Alcohol use: Yes     Comment: RARELY     Drug use: No     Sexual activity: Yes     Partners: Male   Lifestyle     Physical activity     Days per week: None     Minutes per session: None     Stress: None   Relationships     Social connections     Talks on phone: None     Gets together: None     Attends Yazidi service: None     Active member of club or organization: None     Attends meetings of clubs or organizations: None     Relationship status: None     Intimate partner violence     Fear of current or ex partner: None     Emotionally abused: None     Physically abused: None     Forced sexual activity: None   Other Topics Concern      Service Not Asked     Blood Transfusions Yes     Comment: PERMITS     Caffeine Concern Yes     Comment: COFFEE - 2 CUPS DAILY     Occupational Exposure Not Asked     Hobby Hazards Not Asked     Sleep Concern Not Asked     Stress Concern Not Asked     Weight Concern Not Asked     Special Diet Not Asked     Back Care Not Asked     Exercise Yes     Comment: 2-3 TIMES WEEKLY     Bike Helmet Not Asked     Seat Belt Not Asked     Self-Exams Not Asked     Parent/sibling w/ CABG, MI or angioplasty before 65F 55M? No   Social History Narrative     None       ROS: 10 point ROS neg other than the symptoms noted above in the HPI.  EXAM  Constitutional: healthy, alert and no distress    Psychiatric: mentation appears normal and affect normal/bright    VASCULAR:  -Dorsalis pedis pulse +2/4 b/l  -Posterior tibial pulse +2/4 b/l  -Capillary refill time < 3 seconds to b/l hallux  -Hair growth Present to b/l anterior legs and ankles  -Varicosities to bilateral feet and ankles  -Pitting edema 1+ to RIGHT foot and ankle and 2+ to LEFT foot and ankle  NEURO:  -Positive for paresthesias with burning and tingling in the RIGHT distal foot and  the LEFT medial midfoot and medial 1st ray  -Light touch sensation intact to b/l plantar forefoot  DERM:  -Skin temperature, texture and turgor WNL b/l  MSK:  -Pain on palpation to the LEFT medial tubercle of the calcaneus and to multiple   -Pain on palpation to multiple trigger points of the LEFT medial and posterior leg with a positive jump sign    -Muscle strength of ankles +5/5 for dorsiflexion, plantarflexion, ABDUction and ADDuction b/l  -Ankle joint passive ROM within normal limits including dorsiflexion    RADIOGRAPH LEFT FOOT 06/25/2020  IMPRESSION: Normal left foot   CELIA VALENTIN MD  CT LEFT FOOT 06/29/2020              IMPRESSION: Normal left foot    CELIA VALENTIN MD  MRI LEFT FOOT 07/15/2020  IMPRESSION: The above findings are suggestive of plantar fasciitis.  Surgical change of screw plate fixation of the distal fibula.  RONI MENCHACA MD  ============================================================    ASSESSMENT:  (M79.609) Trigger point of extremity  (primary encounter diagnosis)    (M72.2) Plantar fascial fibromatosis    (M79.672) Left foot pain    (M79.7) Fibromyalgia    (I83.11,  I83.12) Varicose veins of both lower extremities with inflammation      PLAN:  -Patient evaluated and examined. Treatment options discussed with no educational barriers noted.  -Reviewed patient's conservative treatment h/o: She has exhausted injections, compression socks, solid tennis shoes, night splints, and PT    -Patient has additional paresthesias in her feet and her pain may be caused from a combination of trigger points in her legs, neuropathy, and her fibromyalgia. She may consider a neurologist to rule in or out neuropathy if she continues to fail with pain improvement with conservative treatment options.  ---Discussed a plantar fasciotomy with patient, but given her distribution of pain and tingling, she may not have a lot of pain relief from the procedure and it may cause add'l / new pain. She is in  agreement with conctinuing with conservative options. Will discuss neuropathy pain management and/or neurology with the patient's PCP if she does not respond well to more conservative treatment options.    -Patient has moderate trigger point injections in her LEFT leg. Discussed benefits and risks of trigger point injections. Will repeat in two weeks and if she has minimal pain relief after today, will consider weekly injections.    -Trigger Point Injections: Written and verbal consent obtained for trigger point injections of Left leg on 07/21/2020. Injection consisted of 2mL of Ketroloc 60mg/2mL and 8mL of 2% Lidocaine plain for a total of 10 mL. The skin of the leg was prepped in a sterile fashion with an alcohol swab over the musculaure. A 25-gauge, 1-1/2 inch needle was advanced into the gastrocnemius, sartorius (distal to the knee joint), soleus and medial heel musculature at the trigger point regions without difficulty or resistance. The patient responded well to today's treatment with a noticeable decrease in heel and calf pain post injection.  ---Patient instructed to moderately stretch and massage the trigger points until the next appointment as often as possible with additional stretching and massaging over the next 2-6 hours while the legs are numb for pain relief while massaging.  ---Will consider adding PT to work on the trigger points after injections from podiatry if she is responding well to the injections, but still having some pain.    -Patient in agreement with the above treatment plan and all of patient's questions were answered.      RTC two weeks for LEFT leg trigger point injections (round 2)        Katt Clifton DPM

## 2020-07-21 NOTE — PATIENT INSTRUCTIONS
Thank you for allowing  and our Podiatry team to participate in your care. Please call our office at [phone] with scheduling questions or with any other questions or concerns.

## 2020-07-21 NOTE — TELEPHONE ENCOUNTER
Just a FYI, this patient's blood pressures remained elevated through out visit 148/84 and 144/82. Recommended to get an appointment.

## 2020-07-21 NOTE — NURSING NOTE
"Chief Complaint   Patient presents with     Musculoskeletal Problem       Initial BP (!) 144/82 (BP Location: Right arm, Patient Position: Sitting, Cuff Size: Adult Regular)   Pulse 100   Temp 96.1  F (35.6  C) (Tympanic)   Ht 1.613 m (5' 3.5\")   Wt 74.8 kg (165 lb)   SpO2 98%   BMI 28.77 kg/m   Estimated body mass index is 28.77 kg/m  as calculated from the following:    Height as of this encounter: 1.613 m (5' 3.5\").    Weight as of this encounter: 74.8 kg (165 lb).  Medication Reconciliation: complete  Sol Wong LPN  "

## 2020-07-23 ENCOUNTER — TELEPHONE (OUTPATIENT)
Dept: FAMILY MEDICINE | Facility: OTHER | Age: 70
End: 2020-07-23

## 2020-07-23 RX ORDER — ALBUTEROL SULFATE 90 UG/1
AEROSOL, METERED RESPIRATORY (INHALATION)
Qty: 18 G | Refills: 0 | Status: SHIPPED | OUTPATIENT
Start: 2020-07-23 | End: 2020-12-07

## 2020-07-23 NOTE — TELEPHONE ENCOUNTER
Called and left message for patient to call back to set up appointment for a nurse only blood pressure check. Jenifer Ramos LPN

## 2020-07-23 NOTE — TELEPHONE ENCOUNTER
11:47 AM    Reason for Call: OVERBOOK    Patient is having the following symptoms: blood pressure check  for 2 weeks.    The patient is requesting an appointment for overbook to see filiberto feliciano for blood pressure check and follow up with edgardo patterson.    Was an appointment offered for this call? Yes  If yes : Appointment type              Date 08/14/20    Preferred method for responding to this message: Telephone Call  What is your phone number ? [phone]    If we cannot reach you directly, may we leave a detailed response at the number you provided? Yes    Can this message wait until your PCP/provider returns, if unavailable today? YES, \    Trena Gracia

## 2020-07-23 NOTE — TELEPHONE ENCOUNTER
Ok to do a nurse only with her for a blood pressure check, or would you like to see her in the clinic? Jenifer Ramos LPN

## 2020-07-24 ENCOUNTER — ALLIED HEALTH/NURSE VISIT (OUTPATIENT)
Dept: FAMILY MEDICINE | Facility: OTHER | Age: 70
End: 2020-07-24
Attending: PHYSICIAN ASSISTANT
Payer: MEDICARE

## 2020-07-24 VITALS — DIASTOLIC BLOOD PRESSURE: 66 MMHG | SYSTOLIC BLOOD PRESSURE: 134 MMHG

## 2020-07-24 DIAGNOSIS — Z01.30 BLOOD PRESSURE CHECK: Primary | ICD-10-CM

## 2020-07-24 PROCEDURE — 99207 ZZC NO CHARGE NURSE ONLY: CPT

## 2020-07-31 ENCOUNTER — TELEPHONE (OUTPATIENT)
Dept: UROLOGY | Facility: OTHER | Age: 70
End: 2020-07-31

## 2020-07-31 NOTE — TELEPHONE ENCOUNTER
Pt calling and bladder training is going good and would like to decrease the Ditropam to 5mg starting approx 8.13.2020.      [facility] in [place].    Call [phone]      Darline Vilchis RN

## 2020-08-04 ENCOUNTER — OFFICE VISIT (OUTPATIENT)
Dept: PODIATRY | Facility: OTHER | Age: 70
End: 2020-08-04
Attending: PODIATRIST
Payer: MEDICARE

## 2020-08-04 VITALS
OXYGEN SATURATION: 97 % | SYSTOLIC BLOOD PRESSURE: 130 MMHG | TEMPERATURE: 97.2 F | DIASTOLIC BLOOD PRESSURE: 70 MMHG | HEART RATE: 86 BPM

## 2020-08-04 DIAGNOSIS — I83.12 VARICOSE VEINS OF BOTH LOWER EXTREMITIES WITH INFLAMMATION: ICD-10-CM

## 2020-08-04 DIAGNOSIS — M79.671 RIGHT FOOT PAIN: ICD-10-CM

## 2020-08-04 DIAGNOSIS — M72.2 PLANTAR FASCIAL FIBROMATOSIS: ICD-10-CM

## 2020-08-04 DIAGNOSIS — M79.7 FIBROMYALGIA: ICD-10-CM

## 2020-08-04 DIAGNOSIS — M79.672 LEFT FOOT PAIN: ICD-10-CM

## 2020-08-04 DIAGNOSIS — I83.11 VARICOSE VEINS OF BOTH LOWER EXTREMITIES WITH INFLAMMATION: ICD-10-CM

## 2020-08-04 DIAGNOSIS — M79.609 TRIGGER POINT OF EXTREMITY: Primary | ICD-10-CM

## 2020-08-04 PROCEDURE — 20553 NJX 1/MLT TRIGGER POINTS 3/>: CPT | Performed by: PODIATRIST

## 2020-08-04 PROCEDURE — G0463 HOSPITAL OUTPT CLINIC VISIT: HCPCS | Mod: 25

## 2020-08-04 PROCEDURE — 99213 OFFICE O/P EST LOW 20 MIN: CPT | Mod: 25 | Performed by: PODIATRIST

## 2020-08-04 PROCEDURE — 20605 DRAIN/INJ JOINT/BURSA W/O US: CPT | Performed by: PODIATRIST

## 2020-08-04 RX ORDER — DEXAMETHASONE SODIUM PHOSPHATE 4 MG/ML
4 INJECTION, SOLUTION INTRA-ARTICULAR; INTRALESIONAL; INTRAMUSCULAR; INTRAVENOUS; SOFT TISSUE ONCE
Status: COMPLETED | OUTPATIENT
Start: 2020-08-04 | End: 2020-08-04

## 2020-08-04 RX ORDER — KETOROLAC TROMETHAMINE 30 MG/ML
60 INJECTION, SOLUTION INTRAMUSCULAR; INTRAVENOUS ONCE
Status: COMPLETED | OUTPATIENT
Start: 2020-08-04 | End: 2020-08-04

## 2020-08-04 RX ORDER — TRIAMCINOLONE ACETONIDE 40 MG/ML
40 INJECTION, SUSPENSION INTRA-ARTICULAR; INTRAMUSCULAR ONCE
Status: COMPLETED | OUTPATIENT
Start: 2020-08-04 | End: 2020-08-04

## 2020-08-04 RX ADMIN — KETOROLAC TROMETHAMINE 60 MG: 30 INJECTION, SOLUTION INTRAMUSCULAR at 16:35

## 2020-08-04 RX ADMIN — DEXAMETHASONE SODIUM PHOSPHATE 4 MG: 4 INJECTION, SOLUTION INTRAMUSCULAR; INTRAVENOUS at 16:34

## 2020-08-04 RX ADMIN — TRIAMCINOLONE ACETONIDE 40 MG: 40 INJECTION, SUSPENSION INTRA-ARTICULAR; INTRAMUSCULAR at 16:35

## 2020-08-04 ASSESSMENT — PAIN SCALES - GENERAL: PAINLEVEL: EXTREME PAIN (8)

## 2020-08-04 NOTE — TELEPHONE ENCOUNTER
Krista, If all is going well she can actually stop the oxybutynin and see how it goes. Please let her know. thanks

## 2020-08-04 NOTE — PROGRESS NOTES
Chief complaint: Patient presents with:  Musculoskeletal Problem: Bilateraly pain      History of Present Illness: This 70 year old female with fibromyalgia is seen for follow-up management of LEFT foot pain. The pain has been progressively worsening for the past few years since around 2017.       She says her first step pain is not as piercing and her redness on her LEFT plantar foot is not as prominent compared to her previous appointment. Her LEFT plantar foot is also not as irritated and burning as much. Her edema has also improved, so she thinks the trigger point injections helped and she is here today for another round of injections.     Additionally today, her RIGHT heel is also starting to hurt so she is wondering if she could get a steroid injection in the RIGHT heel today. She had some success with steroid injections in the LEFT heel in the past and she has not had any steroid injections on the RIGHT.    No further pedal complaints today.     Patient does not use tobacco products.       History of LEFT heel pain:  First step pain, especially in the morning, causes the worst pain. She went through physical therapy and she completed this around early July, 2020. She also had ultrasound treatment of the heel which helped a bit, but it hurt a lot for the final session. She has an alpha stimulator which has been helping.    She had noticed an increase in edema both feet and ankles when she first saw podiatry here on 0/21/2020. She wears compression socks during the winter which helps a lot with pain, but she does not wear them in the summer because they are too hot.    She went to Dr. Schmitz for the past three years and she has had new CMOs for the past three years. She has been wearing jayro tennis shoes as well. Sometimes they help, and sometimes they do not. She also received plastic inserts and soft inserts, but the firm ones seem to work better.     She has had injections in the LEFT heel with the most recent  injections from last fall of 2019. She has never had injections in the RIGHT foot. She has also tried night splints. Surgery was recently offered, but she would like to exhaust conservative treatment options if possible.    Historically, she broke her ankle in 2007 with an ORIF.    She also complains of a newer tingling and burning sensation in the RIGHT dorsal digits and the and in the medial aspect of her LEFT foot.       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)     Glucose intolerance (impaired glucose tolerance)     Insomnia     Major depressive disorder     Mixed stress and urge urinary incontinence     Leg cramps     Vitamin D deficiency disease     Complete rupture of rotator cuff     Esophageal reflux     Glucose intolerance     Myalgia and myositis     Open bimalleolar fracture     Other malaise and fatigue     Pain in joint, ankle and foot     Fibromyalgia       Past Surgical History:   Procedure Laterality Date     BILATERAL CATARACT EXTRACTION  2006 01/01/2011     Bunion Surgery > RT  2010     COLONOSCOPY  12/23/2013    Procedure: COLONOSCOPY;  COLONOSCOPY;  Surgeon: Kasia Enrique DO;  Location: [facility]     D&C  1991 01/01/2011     deviated septum repair  2012     ECHOCARDIOGRAM  2006 01/01/2011     HEMORRHOIDECTOMY  1979    01/01/2011     HYSTERECTOMY  1995 01/01/2011     LAPAROSCOPIC HERNIORRHAPHY VENTRAL  1/16/2014    Procedure: LAPAROSCOPIC HERNIORRHAPHY VENTRAL;  LAPAROSCOPIC VENTRAL HERNIA REPAIR W/ MESH;  Surgeon: Kasia Enrique DO;  Location: [facility] OR     leg repair after fx  >LT  2009     RELEASE CARPAL TUNNEL  2010    RT     TRIGGER  THUMB RELEASE  2011       Current Outpatient Medications   Medication     amphetamine-dextroamphetamine (ADDERALL) 20 MG tablet     buPROPion (WELLBUTRIN XL) 300  "MG 24 hr tablet     Calcium Carbonate-Vit D-Min (CALCIUM 1200 PO)     cholecalciferol (VITAMIN D3) 1250 mcg (66655 units) capsule     cyclobenzaprine (FLEXERIL) 5 MG tablet     docusate sodium (COLACE) 100 MG capsule     hydrochlorothiazide (HYDRODIURIL) 50 MG tablet     ipratropium - albuterol 0.5 mg/2.5 mg/3 mL (DUONEB) 0.5-2.5 (3) MG/3ML neb solution     Magnesium Oxide 500 MG CAPS     ORDER FOR DME     ORDER FOR DME     ORDER FOR DME     oxybutynin ER (DITROPAN-XL) 10 MG 24 hr tablet     pramipexole (MIRAPEX) 0.5 MG tablet     predniSONE (DELTASONE) 20 MG tablet     traMADol (ULTRAM) 50 MG tablet     VENTOLIN  (90 Base) MCG/ACT inhaler     zolpidem (AMBIEN) 5 MG tablet     Current Facility-Administered Medications   Medication     albuterol (PROAIR HFA/PROVENTIL HFA/VENTOLIN HFA) 108 (90 Base) MCG/ACT inhaler 2 puff          Allergies   Allergen Reactions     Acetaminophen      Darvocet-N 100       Bupropion Other (See Comments)     Increased anxiety     Clonidine      Dropped BP Drastically     Codamine Itching and Other (See Comments)     \"Buzzy\"     Codeine      Hydrocodone      \"Patient states can take liquid med but not pill\".     Lyrica Headache, Itching and Swelling     Meperidine      Oxycodone-Aspirin Itching, Nausea and Vomiting and Other (See Comments)     \"Buzzy\"     Pimozide Other (See Comments)     Drowsiness     Propoxyphene Napsylate      Darvocet-N 100     Seasonal Allergies Other (See Comments)     Seasonal allergies.  Per 7/3/07, runny nose, stuffy, plugged ears.       Family History   Problem Relation Age of Onset     Alcohol/Drug Father 42        Motor Vehicle Accident due to Alcoholism - Cause of Death     Diabetes Maternal Uncle      Parkinsonism Maternal Uncle      Depression Mother      Hypertension Mother      Cancer Mother         Cervical     Parkinsonism Mother      Other - See Comments Sister         ADD/ADHD     Other - See Comments Sister         Fibromyalgia     Diabetes " Sister      Diabetes Sister      Other - See Comments Sister         ADD/ADHD     Other - See Comments Son         ADD/ADHD     Other - See Comments Son         ADD/ADHD       Social History     Socioeconomic History     Marital status:      Spouse name: None     Number of children: None     Years of education: None     Highest education level: None   Occupational History     Occupation: RN - MENTAL HEALTH     Employer: RETIRED   Social Needs     Financial resource strain: None     Food insecurity     Worry: None     Inability: None     Transportation needs     Medical: None     Non-medical: None   Tobacco Use     Smoking status: Never Smoker     Smokeless tobacco: Never Used   Substance and Sexual Activity     Alcohol use: Yes     Comment: RARELY     Drug use: No     Sexual activity: Yes     Partners: Male   Lifestyle     Physical activity     Days per week: None     Minutes per session: None     Stress: None   Relationships     Social connections     Talks on phone: None     Gets together: None     Attends [employer] witness service: None     Active member of club or organization: None     Attends meetings of clubs or organizations: None     Relationship status: None     Intimate partner violence     Fear of current or ex partner: None     Emotionally abused: None     Physically abused: None     Forced sexual activity: None   Other Topics Concern      Service Not Asked     Blood Transfusions Yes     Comment: PERMITS     Caffeine Concern Yes     Comment: COFFEE - 2 CUPS DAILY     Occupational Exposure Not Asked     Hobby Hazards Not Asked     Sleep Concern Not Asked     Stress Concern Not Asked     Weight Concern Not Asked     Special Diet Not Asked     Back Care Not Asked     Exercise Yes     Comment: 2-3 TIMES WEEKLY     Bike Helmet Not Asked     Seat Belt Not Asked     Self-Exams Not Asked     Parent/sibling w/ CABG, MI or angioplasty before 65F 55M? No   Social History Narrative     None       ROS: 10  point ROS neg other than the symptoms noted above in the HPI.  EXAM  Constitutional: healthy, alert and no distress    Psychiatric: mentation appears normal and affect normal/bright    VASCULAR:  -Dorsalis pedis pulse +2/4 b/l  -Posterior tibial pulse +2/4 b/l  -Capillary refill time < 3 seconds to b/l hallux  -Hair growth Present to b/l anterior legs and ankles  -Varicosities to bilateral feet and ankles  -Pitting edema 1+ to RIGHT foot and ankle and 2+ to LEFT foot and ankle  NEURO:  -Positive for paresthesias with burning and tingling in the RIGHT distal foot and the LEFT medial midfoot and medial 1st ray  -Light touch sensation intact to b/l plantar forefoot  DERM:  -Skin temperature, texture and turgor WNL b/l  MSK:  -Pain on palpation to the RIGHT medial tubercle of the calcaneus and to multiple   -Pain on palpation to multiple trigger points of the LEFT medial and posterior leg with a positive jump sign including mild Pain on palpation to LEFT medial tubercle of calcaneus  ---All trigger points are less painful than 07/21/2020    -Muscle strength of ankles +5/5 for dorsiflexion, plantarflexion, ABDUction and ADDuction b/l  -Ankle joint passive ROM within normal limits including dorsiflexion    RADIOGRAPH LEFT FOOT 06/25/2020  IMPRESSION: Normal left foot   CELIA VALENTIN MD  CT LEFT FOOT 06/29/2020              IMPRESSION: Normal left foot    CELIA VALENTIN MD  MRI LEFT FOOT 07/15/2020  IMPRESSION: The above findings are suggestive of plantar fasciitis.  Surgical change of screw plate fixation of the distal fibula.  RONI MENCHACA MD  ============================================================    ASSESSMENT:  (M79.609) Trigger point of extremity  (primary encounter diagnosis)    (M72.2) Plantar fascial fibromatosis    (M79.672) Left foot pain    (M79.671) Right foot pain    (M79.7) Fibromyalgia    (I83.11,  I83.12) Varicose veins of both lower extremities with inflammation      PLAN:  -Patient  evaluated and examined. Treatment options discussed with no educational barriers noted.  -Reviewed patient's conservative treatment h/o: She has exhausted injections, compression socks, solid tennis shoes, night splints, and PT  -Patient's trigger point injections in the LEFT leg and heel on 07/21/2020 improved her overall leg and foot pain. She is here for round two today.    -Previously discussed that patient has additional paresthesias in her feet and her pain may be caused from a combination of trigger points in her legs, neuropathy, and her fibromyalgia. She may consider a neurologist to rule in or out neuropathy if she continues to fail with pain improvement with conservative treatment options.  ---Discussed a plantar fasciotomy with patient, but given her distribution of pain and tingling, she may not have a lot of pain relief from the procedure and it may cause add'l / new pain. She is in agreement with conctinuing with conservative options. Will discuss neuropathy pain management and/or neurology with the patient's PCP if she does not respond well to more conservative treatment options.    -Patient has moderate trigger point injections in her LEFT leg. Discussed benefits and risks of trigger point injections. Will repeat in two weeks and if she has minimal pain relief after today, will consider weekly injections.    -Trigger Point Injections x 10 locations to LEFT heel and leg: Written and verbal consent obtained for trigger point injections of Left leg on 07/21/2020. Injection consisted of 2mL of Ketroloc 60mg/2mL and 8mL of 2% Lidocaine plain for a total of 10 mL. The skin of the leg was prepped in a sterile fashion with an alcohol swab over the musculaure. A 25-gauge, 1-1/2 inch needle was advanced into the gastrocnemius, sartorius (distal to the knee joint), soleus and medial heel musculature at the trigger point regions without difficulty or resistance. The patient responded well to today's treatment  with a noticeable decrease in heel and calf pain post injection.  ---Patient instructed to moderately stretch and massage the trigger points until the next appointment as often as possible with additional stretching and massaging over the next 2-6 hours while the legs are numb for pain relief while massaging.  ---Will consider adding PT to work on the trigger points after injections from podiatry if she is responding well to the injections, but still having some pain.    Trigger point injections round 1 on LEFT: 07/21/2020  Trigger point injections round 2 on LEFT: 08/04/2020    -Injection x 1: Obtained written and verbal consent from patient for a steroid injection into the medial tubercle of hte calcaneus of the Right  foot. Reviewed risks and benefits of the injection. Informed patient that the injection may decrease pain long-term, short-term, or not at all. Patient in agreement with an injection today in an effort to slow or reverse the chronic inflammatory process and pain in the foot.   ---Patient signed informed consent and a time-out was performed and there was verification of correct patient, procedure and laterality.  ---Prepped the injection site with an alcohol swab.  ---Injected a total of 3 mL of 1:1:1 of 4mg Dexamethasone, 40mg Kenalog and 2% Lidocaine plain. Patient tolerated the injection well.    -Patient in agreement with the above treatment plan and all of patient's questions were answered.      RTC two weeks for LEFT leg trigger point injections (round 3)        Katt Clifton DPM

## 2020-08-04 NOTE — NURSING NOTE
"Chief Complaint   Patient presents with     Musculoskeletal Problem     Bilateraly pain       Initial /70 (BP Location: Left arm, Patient Position: Sitting, Cuff Size: Adult Regular)   Pulse 86   Temp 97.2  F (36.2  C) (Tympanic)   SpO2 97%  Estimated body mass index is 28.77 kg/m  as calculated from the following:    Height as of 7/21/20: 1.613 m (5' 3.5\").    Weight as of 7/21/20: 74.8 kg (165 lb).  Medication Reconciliation: complete  Cindy Pineda  "

## 2020-08-04 NOTE — PROGRESS NOTES
Clinic Administered Medication Documentation      Injection Documentation     Injection was administered by provider (please see MAR for given by information). Please see MAR and medication order for additional information.     Site: Trigger point injection   Medication Used: Ketorolac 60 mg/2 mg      Expiration Date:  01/2022      Clinic Administered Medication Documentation      Injectable Medication Documentation    Patient was given Dexamethasone Sodium Phosphate . Prior to medication administration, verified patients identity using patient s name and date of birth. Please see MAR and medication order for additional information. Patient instructed to report any adverse reaction to staff immediately .      Was entire vial of medication used? Yes  Vial/Syringe: Single dose vial  Expiration Date:  04/2021  Was this medication supplied by the patient? No    Clinic Administered Medication Documentation      Injectable Medication Documentation    Patient was given KEnalog 40 mg. Prior to medication administration, verified patients identity using patient s name and date of birth. Please see MAR and medication order for additional information. Patient instructed to report any adverse reaction to staff immediately .      Was entire vial of medication used? Yes  Vial/Syringe: Single dose vial  Expiration Date:  3/2021  Was this medication supplied by the patient? No

## 2020-08-17 ENCOUNTER — MYC MEDICAL ADVICE (OUTPATIENT)
Dept: FAMILY MEDICINE | Facility: OTHER | Age: 70
End: 2020-08-17

## 2020-08-19 ENCOUNTER — OFFICE VISIT (OUTPATIENT)
Dept: PODIATRY | Facility: OTHER | Age: 70
End: 2020-08-19
Attending: PODIATRIST
Payer: MEDICARE

## 2020-08-19 VITALS
DIASTOLIC BLOOD PRESSURE: 66 MMHG | TEMPERATURE: 97.2 F | BODY MASS INDEX: 28.77 KG/M2 | OXYGEN SATURATION: 98 % | HEART RATE: 90 BPM | WEIGHT: 165 LBS | SYSTOLIC BLOOD PRESSURE: 121 MMHG

## 2020-08-19 DIAGNOSIS — I83.12 VARICOSE VEINS OF BOTH LOWER EXTREMITIES WITH INFLAMMATION: ICD-10-CM

## 2020-08-19 DIAGNOSIS — M72.2 PLANTAR FASCIAL FIBROMATOSIS: ICD-10-CM

## 2020-08-19 DIAGNOSIS — M79.7 FIBROMYALGIA: ICD-10-CM

## 2020-08-19 DIAGNOSIS — I83.11 VARICOSE VEINS OF BOTH LOWER EXTREMITIES WITH INFLAMMATION: ICD-10-CM

## 2020-08-19 DIAGNOSIS — M79.672 LEFT FOOT PAIN: ICD-10-CM

## 2020-08-19 DIAGNOSIS — M79.609 TRIGGER POINT OF EXTREMITY: Primary | ICD-10-CM

## 2020-08-19 DIAGNOSIS — M79.671 RIGHT FOOT PAIN: ICD-10-CM

## 2020-08-19 PROCEDURE — 20553 NJX 1/MLT TRIGGER POINTS 3/>: CPT | Performed by: PODIATRIST

## 2020-08-19 PROCEDURE — G0463 HOSPITAL OUTPT CLINIC VISIT: HCPCS

## 2020-08-19 RX ORDER — KETOROLAC TROMETHAMINE 30 MG/ML
30 INJECTION, SOLUTION INTRAMUSCULAR; INTRAVENOUS ONCE
Status: COMPLETED | OUTPATIENT
Start: 2020-08-19 | End: 2020-08-19

## 2020-08-19 RX ADMIN — KETOROLAC TROMETHAMINE 30 MG: 30 INJECTION, SOLUTION INTRAMUSCULAR; INTRAVENOUS at 16:04

## 2020-08-19 RX ADMIN — KETOROLAC TROMETHAMINE 30 MG: 30 INJECTION, SOLUTION INTRAMUSCULAR; INTRAVENOUS at 16:03

## 2020-08-19 ASSESSMENT — PAIN SCALES - GENERAL: PAINLEVEL: MILD PAIN (2)

## 2020-08-19 NOTE — NURSING NOTE
"Chief Complaint   Patient presents with     plantar fascitius       Initial /66 (BP Location: Left arm, Patient Position: Sitting, Cuff Size: Adult Regular)   Pulse 90   Temp 97.2  F (36.2  C) (Tympanic)   Wt 74.8 kg (165 lb)   SpO2 98%   BMI 28.77 kg/m   Estimated body mass index is 28.77 kg/m  as calculated from the following:    Height as of 7/21/20: 1.613 m (5' 3.5\").    Weight as of this encounter: 74.8 kg (165 lb).  Medication Reconciliation: complete  Charline Ritchie LPN  "

## 2020-08-19 NOTE — PROGRESS NOTES
Clinic Administered Medication Documentation      Injectable Medication Documentation    Patient was given Ketorolac Tromethamine (Toradol). Prior to medication administration, verified patients identity using patient s name and date of birth. Please see MAR and medication order for additional information. Patient instructed to report any adverse reaction to staff immediately .      Was entire vial of medication used? Yes  Vial/Syringe: Single dose vial  Expiration Date:  10/2020  Was this medication supplied by the patient? No       Clinic Administered Medication Documentation      Injectable Medication Documentation    Patient was given Ketorolac Tromethamine (Toradol). Prior to medication administration, verified patients identity using patient s name and date of birth. Please see MAR and medication order for additional information. Patient instructed to report any adverse reaction to staff immediately .      Was entire vial of medication used? Yes  Vial/Syringe: Single dose vial  Expiration Date:  10/2020  Was this medication supplied by the patient? No

## 2020-08-19 NOTE — PROGRESS NOTES
Chief complaint: Patient presents with:  plantar fascitius      History of Present Illness: This 70 year old female with fibromyalgia is seen for follow-up management of LEFT foot pain. The pain has been progressively worsening for the past few years since around 2017.     She has had two rounds of trigger point injections and each round has lasted longer and longer. Her LEFT heel pain is greatly improving since starting the trigger point injections. She still has some pain so she would like another round of injections today.    Her RIGHT heel pain completely resolved after her RIGHT heel injection on 08/04/2020.    No further pedal complaints today.     Patient does not use tobacco products.       History of LEFT heel pain:  First step pain, especially in the morning, causes the worst pain. She went through physical therapy and she completed this around early July, 2020. She also had ultrasound treatment of the heel which helped a bit, but it hurt a lot for the final session. She has an alpha stimulator which has been helping.    She had noticed an increase in edema both feet and ankles when she first saw podiatry here on 0/21/2020. She wears compression socks during the winter which helps a lot with pain, but she does not wear them in the summer because they are too hot.    She went to Dr. Schmitz for the past three years and she has had new CMOs for the past three years. She has been wearing jayro tennis shoes as well. Sometimes they help, and sometimes they do not. She also received plastic inserts and soft inserts, but the firm ones seem to work better.     She has had injections in the LEFT heel with the most recent injections from last fall of 2019. She has never had injections in the RIGHT foot. She has also tried night splints. Surgery was recently offered, but she would like to exhaust conservative treatment options if possible.    Historically, she broke her ankle in 2007 with an ORIF.    She also complains  of a newer tingling and burning sensation in the RIGHT dorsal digits and the and in the medial aspect of her LEFT foot.       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)     Glucose intolerance (impaired glucose tolerance)     Insomnia     Major depressive disorder     Mixed stress and urge urinary incontinence     Leg cramps     Vitamin D deficiency disease     Complete rupture of rotator cuff     Esophageal reflux     Glucose intolerance     Myalgia and myositis     Open bimalleolar fracture     Other malaise and fatigue     Pain in joint, ankle and foot     Fibromyalgia       Past Surgical History:   Procedure Laterality Date     BILATERAL CATARACT EXTRACTION  2006 01/01/2011     Bunion Surgery > RT  2010     COLONOSCOPY  12/23/2013    Procedure: COLONOSCOPY;  COLONOSCOPY;  Surgeon: Kasia Enrique DO;  Location: [facility]     D&C  1991 01/01/2011     deviated septum repair  2012     ECHOCARDIOGRAM  2006 01/01/2011     HEMORRHOIDECTOMY  1979    01/01/2011     HYSTERECTOMY  1995    01/01/2011     LAPAROSCOPIC HERNIORRHAPHY VENTRAL  1/16/2014    Procedure: LAPAROSCOPIC HERNIORRHAPHY VENTRAL;  LAPAROSCOPIC VENTRAL HERNIA REPAIR W/ MESH;  Surgeon: Kasia Enrique DO;  Location: [facility] OR     leg repair after fx  >LT  2009     RELEASE CARPAL TUNNEL  2010    RT     TRIGGER  THUMB RELEASE  2011       Current Outpatient Medications   Medication     amphetamine-dextroamphetamine (ADDERALL) 20 MG tablet     buPROPion (WELLBUTRIN XL) 300 MG 24 hr tablet     Calcium Carbonate-Vit D-Min (CALCIUM 1200 PO)     cholecalciferol (VITAMIN D3) 1250 mcg (63501 units) capsule     cyclobenzaprine (FLEXERIL) 5 MG tablet     docusate sodium (COLACE) 100 MG capsule     hydrochlorothiazide (HYDRODIURIL) 50  "MG tablet     ipratropium - albuterol 0.5 mg/2.5 mg/3 mL (DUONEB) 0.5-2.5 (3) MG/3ML neb solution     Magnesium Oxide 500 MG CAPS     ORDER FOR DME     ORDER FOR DME     ORDER FOR DME     pramipexole (MIRAPEX) 0.5 MG tablet     predniSONE (DELTASONE) 20 MG tablet     traMADol (ULTRAM) 50 MG tablet     VENTOLIN  (90 Base) MCG/ACT inhaler     zolpidem (AMBIEN) 5 MG tablet     oxybutynin ER (DITROPAN-XL) 10 MG 24 hr tablet     Current Facility-Administered Medications   Medication     albuterol (PROAIR HFA/PROVENTIL HFA/VENTOLIN HFA) 108 (90 Base) MCG/ACT inhaler 2 puff          Allergies   Allergen Reactions     Acetaminophen      Darvocet-N 100       Clonidine      Dropped BP Drastically     Codamine Itching and Other (See Comments)     \"Buzzy\"     Codeine      Hydrocodone      \"Patient states can take liquid med but not pill\".     Lyrica Headache, Itching and Swelling     Meperidine      Oxycodone-Aspirin Itching, Nausea and Vomiting and Other (See Comments)     \"Buzzy\"     Pimozide Other (See Comments)     Drowsiness     Propoxyphene Napsylate      Darvocet-N 100     Seasonal Allergies Other (See Comments)     Seasonal allergies.  Per 7/3/07, runny nose, stuffy, plugged ears.       Family History   Problem Relation Age of Onset     Alcohol/Drug Father 42        Motor Vehicle Accident due to Alcoholism - Cause of Death     Diabetes Maternal Uncle      Parkinsonism Maternal Uncle      Depression Mother      Hypertension Mother      Cancer Mother         Cervical     Parkinsonism Mother      Other - See Comments Sister         ADD/ADHD     Other - See Comments Sister         Fibromyalgia     Diabetes Sister      Diabetes Sister      Other - See Comments Sister         ADD/ADHD     Other - See Comments Son         ADD/ADHD     Other - See Comments Son         ADD/ADHD       Social History     Socioeconomic History     Marital status:      Spouse name: None     Number of children: None     Years of " education: None     Highest education level: None   Occupational History     Occupation: RN - MENTAL HEALTH     Employer: RETIRED   Social Needs     Financial resource strain: None     Food insecurity     Worry: None     Inability: None     Transportation needs     Medical: None     Non-medical: None   Tobacco Use     Smoking status: Never Smoker     Smokeless tobacco: Never Used   Substance and Sexual Activity     Alcohol use: Yes     Comment: RARELY     Drug use: No     Sexual activity: Yes     Partners: Male   Lifestyle     Physical activity     Days per week: None     Minutes per session: None     Stress: None   Relationships     Social connections     Talks on phone: None     Gets together: None     Attends Confucianism service: None     Active member of club or organization: None     Attends meetings of clubs or organizations: None     Relationship status: None     Intimate partner violence     Fear of current or ex partner: None     Emotionally abused: None     Physically abused: None     Forced sexual activity: None   Other Topics Concern      Service Not Asked     Blood Transfusions Yes     Comment: PERMITS     Caffeine Concern Yes     Comment: COFFEE - 2 CUPS DAILY     Occupational Exposure Not Asked     Hobby Hazards Not Asked     Sleep Concern Not Asked     Stress Concern Not Asked     Weight Concern Not Asked     Special Diet Not Asked     Back Care Not Asked     Exercise Yes     Comment: 2-3 TIMES WEEKLY     Bike Helmet Not Asked     Seat Belt Not Asked     Self-Exams Not Asked     Parent/sibling w/ CABG, MI or angioplasty before 65F 55M? No   Social History Narrative     None       ROS: 10 point ROS neg other than the symptoms noted above in the HPI.  EXAM  Constitutional: healthy, alert and no distress    Psychiatric: mentation appears normal and affect normal/bright    VASCULAR:  -Dorsalis pedis pulse +2/4 b/l  -Posterior tibial pulse +2/4 b/l  -Capillary refill time < 3 seconds to b/l  hallux  -Hair growth Present to b/l anterior legs and ankles  -Varicosities to bilateral feet and ankles  -Pitting edema 1+ to RIGHT foot and ankle and 2+ to LEFT foot and ankle  NEURO:  -Positive for paresthesias with burning and tingling in the RIGHT distal foot and the LEFT medial midfoot and medial 1st ray, improved from 08/04/2020  -Light touch sensation intact to b/l plantar forefoot  DERM:  -Skin temperature, texture and turgor WNL b/l  MSK:  -No pain to RIGHT medial tubercle of the calcaneus  -Pain on palpation to multiple trigger points of the LEFT medial and posterior leg with a positive jump sign including mild Pain on palpation to LEFT medial tubercle of calcaneus  ---All trigger points are less painful than 08/04/2020    -Muscle strength of ankles +5/5 for dorsiflexion, plantarflexion, ABDUction and ADDuction b/l  -Ankle joint passive ROM within normal limits including dorsiflexion    RADIOGRAPH LEFT FOOT 06/25/2020  IMPRESSION: Normal left foot   CELIA VALENTIN MD  CT LEFT FOOT 06/29/2020              IMPRESSION: Normal left foot    CELIA VALENTIN MD  MRI LEFT FOOT 07/15/2020  IMPRESSION: The above findings are suggestive of plantar fasciitis.  Surgical change of screw plate fixation of the distal fibula.  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 Treatment options discussed with no educational barriers noted.  -Patient tried other conservative treatment h/o: She has exhausted injections, compression socks, solid tennis shoes, night splints, and PT  -Patient's trigger point injections in the LEFT leg and heel on 07/21/2020 and  08/04/2020 have greatly improved her pain. She is here for round three today.    -Previously discussed that patient has additional paresthesias in her feet and her pain may be caused from a combination of trigger points in her legs, neuropathy, and her fibromyalgia. She may consider a neurologist to rule in or out neuropathy if she continues to fail with pain improvement with conservative treatment options.  ---Previously discussed a plantar fasciotomy with patient, but given her distribution of pain and tingling, she may not have a lot of pain relief from the procedure and it may cause add'l / new pain. She is in agreement with conctinuing with conservative options. Will discuss neuropathy pain management and/or neurology with the patient's PCP if she does not respond well to more conservative treatment options. She has been responding well to trigger point injections.    -Trigger Point Injections x 10 locations to LEFT heel and leg: Written and verbal consent obtained for trigger point injections of Left leg on 08/19/2020. Injection consisted of 2mL of Ketroloc 60mg/2mL and 8mL of 2% Lidocaine plain for a total of 10 mL. The skin of the leg was prepped in a sterile fashion with an alcohol swab over the musculaure. A 25-gauge, 1-1/2 inch needle was advanced into the gastrocnemius, sartorius (distal to the knee joint), soleus and medial heel musculature at the trigger point regions without difficulty or resistance. The patient responded well to today's treatment with a noticeable decrease in heel and calf pain post injection.  ---Patient instructed to moderately stretch and massage the trigger points until the next appointment as often as possible with additional stretching and massaging over the next 2-6 hours while the legs are numb for pain relief while massaging.  ---Will consider adding PT to work on the trigger points after injections from podiatry if she is responding well to the injections, but still having  some pain.    Trigger point injections round 1 on LEFT: 07/21/2020  Trigger point injections round 2 on LEFT: 08/04/2020  Trigger point injections round 3 on LEFT: 08/19/2020      -Patient in agreement with the above treatment plan and all of patient's questions were answered.      RTC four weeks for LEFT leg trigger point injections (round 4)  Patient will be gone for the next three weeks in [place] and [facility]        Katt Clifton DPM

## 2020-08-24 DIAGNOSIS — M54.9 BACK PAIN: Primary | ICD-10-CM

## 2020-08-24 DIAGNOSIS — M79.7 FIBROMYALGIA: ICD-10-CM

## 2020-09-10 ENCOUNTER — TELEPHONE (OUTPATIENT)
Dept: FAMILY MEDICINE | Facility: OTHER | Age: 70
End: 2020-09-10

## 2020-09-10 NOTE — TELEPHONE ENCOUNTER
Called and left message for patient letting her know that that her trodes were ordered and sent to the fax number provided. Jenifre Ramos LPN

## 2020-09-10 NOTE — PROGRESS NOTES
"Pt called regarding DME AS Tropes self adhesive electrodes   Pt hasn't heard anything, pt stated \"I usually buy them where I can find them, Medicare will cover this\".     Copied and pasted from my chart not on 8/5/20. Was this DME faxed? PCP nurse to call pt back.     DME order that [place] must use, and [facility]                                                    [facility]  phone ([phone]) Fax ([phone])                        Phone ([phone])  Fax  ([phone])      Call pt back [phone]  "

## 2020-09-21 DIAGNOSIS — M79.7 FIBROMYALGIA: ICD-10-CM

## 2020-09-22 NOTE — TELEPHONE ENCOUNTER
Tramadol  Last Written Prescription Date: 10/31/19  Last Fill Quantity: 60 # of Refills: 0  Last Office Visit: 6/25/20

## 2020-09-23 RX ORDER — TRAMADOL HYDROCHLORIDE 50 MG/1
TABLET ORAL
Qty: 60 TABLET | Refills: 0 | Status: SHIPPED | OUTPATIENT
Start: 2020-09-23 | End: 2021-06-24

## 2020-09-25 ENCOUNTER — TELEPHONE (OUTPATIENT)
Dept: FAMILY MEDICINE | Facility: OTHER | Age: 70
End: 2020-09-25

## 2020-09-25 DIAGNOSIS — G89.29 CHRONIC BILATERAL LOW BACK PAIN WITH LEFT-SIDED SCIATICA: Primary | ICD-10-CM

## 2020-09-25 DIAGNOSIS — M54.42 CHRONIC BILATERAL LOW BACK PAIN WITH LEFT-SIDED SCIATICA: Primary | ICD-10-CM

## 2020-09-25 NOTE — TELEPHONE ENCOUNTER
----- Message from Prema Purdy sent at 9/15/2020  3:53 PM CDT -----  Regarding: Electrodes  Good Afternoon,    Anya came to [facility] regarding electrodes for her tens/alpha stim machine. We no longer carry the supplies as we haven't provided these with insurance for a few years. She wanted me to message you to let you know to send an order to [facility] out of [place]. They still do the tens units so I would assume they carry supplies.    Thank You,  Sherry Gurwinder  DME  [facility]  Phone#[phone]

## 2020-10-08 ENCOUNTER — MYC MEDICAL ADVICE (OUTPATIENT)
Dept: FAMILY MEDICINE | Facility: OTHER | Age: 70
End: 2020-10-08

## 2020-12-05 DIAGNOSIS — G25.81 RESTLESS LEGS SYNDROME (RLS): ICD-10-CM

## 2020-12-05 DIAGNOSIS — J98.01 BRONCHOSPASM: ICD-10-CM

## 2020-12-07 RX ORDER — ALBUTEROL SULFATE 90 UG/1
AEROSOL, METERED RESPIRATORY (INHALATION)
Qty: 8.5 G | Refills: 1 | Status: SHIPPED | OUTPATIENT
Start: 2020-12-07 | End: 2021-11-15

## 2020-12-07 NOTE — TELEPHONE ENCOUNTER
ambien      Last Written Prescription Date:  5/4/20  Last Fill Quantity: 30,   # refills: 3  Last Office Visit: 6/25/20  Future Office visit:    Next 5 appointments (look out 90 days)    Feb 02, 2021  2:30 PM  (Arrive by 2:15 PM)  Return Visit with Leigh Dietz MD  [facility] ([facility] ) [address] [place] [address]  [phone]

## 2020-12-09 RX ORDER — ZOLPIDEM TARTRATE 5 MG/1
5 TABLET ORAL
Qty: 30 TABLET | Refills: 1 | Status: SHIPPED | OUTPATIENT
Start: 2020-12-09 | End: 2022-06-01

## 2020-12-20 ENCOUNTER — HEALTH MAINTENANCE LETTER (OUTPATIENT)
Age: 70
End: 2020-12-20

## 2021-01-29 DIAGNOSIS — M79.7 FIBROMYALGIA: ICD-10-CM

## 2021-01-29 RX ORDER — CYCLOBENZAPRINE HCL 5 MG
5 TABLET ORAL 3 TIMES DAILY PRN
Qty: 90 TABLET | Refills: 3 | Status: SHIPPED | OUTPATIENT
Start: 2021-01-29 | End: 2023-05-08

## 2021-01-29 NOTE — TELEPHONE ENCOUNTER
cyclobenzaprine (FLEXERIL) 5 MG tablet   Last Written Prescription Date:  10/31/19  Last Fill Quantity: 90,   # refills: 3  Last Office Visit: 6/25/20  Future Office visit:       Routing refill request to provider for review/approval

## 2021-02-22 ENCOUNTER — MYC MEDICAL ADVICE (OUTPATIENT)
Dept: FAMILY MEDICINE | Facility: OTHER | Age: 71
End: 2021-02-22

## 2021-06-14 ENCOUNTER — TELEPHONE (OUTPATIENT)
Dept: FAMILY MEDICINE | Facility: OTHER | Age: 71
End: 2021-06-14

## 2021-06-14 NOTE — TELEPHONE ENCOUNTER
8:58 AM    Reason for Call: OVERBOOK    Patient is having the following symptoms: lower back pain and knee pain  for 5 weeks.    The patient is requesting an appointment for the week of 6/20/21 with Cindy Danielle or Shruthi Hicks .    Was an appointment offered for this call? No  If yes : Appointment type              Date    Preferred method for responding to this message: Telephone Call  What is your phone number ? [phone]    If we cannot reach you directly, may we leave a detailed response at the number you provided? Yes    Can this message wait until your PCP/provider returns, if unavailable today? Cate,     Jo Felix

## 2021-06-18 NOTE — PROGRESS NOTES
"    Assessment & Plan     Asymptomatic postmenopausal estrogen deficiency  Due for a check on this.  Has been since 2013.  Has some scolosis and needing to check this due to back pain that seem sto be worsening.   - DX Hip/Pelvis/Spine; Future    Scheurmann's disease  Recheck for her Chiropractic Care.   - XR THORACIC SPINE 3 VW (Clinic Performed); Future  - XR LUMBAR SPINE 2/3 VIEWS (Clinic Performed); Future  - MR Lumbar Spine w/o Contrast; Future  - MR Thoracic Spine w/o Contrast; Future  - diazepam (VALIUM) 5 MG tablet; Take 1 tablet (5 mg) by mouth every 6 hours as needed (pre procedure sedation)    Other secondary scoliosis, thoracolumbar region  scoliosis is mid back and back pain in lower right side in SI.   - XR THORACIC SPINE 3 VW (Clinic Performed); Future  - MR Thoracic Spine w/o Contrast; Future    Muscle cramping  Always cramping in legs. Trying to do stretching and getting caught in a certain position due to sever cramping.   - Magnesium  - MR Lumbar Spine w/o Contrast; Future    Fibromyalgia  Long standing issues.   - traMADol (ULTRAM) 50 MG tablet; TAKE 1 TO 2 TABLETS BY MOUTH EVERY 6 HOURS AS NEEDED FOR MODERATE PAIN    Ordering of each unique test  Prescription drug management  5 minutes spent on the date of the encounter doing chart review        BMI:   Estimated body mass index is 28.77 kg/m  as calculated from the following:    Height as of 7/21/20: 1.613 m (5' 3.5\").    Weight as of 8/19/20: 74.8 kg (165 lb).   Weight management plan: Discussed healthy diet and exercise guidelines    See Patient Instructions    No follow-ups on file.    RADHAMES Bowman  [facility] - MAHOGANY Rodgers is a 71 year old who presents for the following health issues     HPI     Musculoskeletal problem/pain  Onset/Duration: 1982 - on going   Description  Location: Bilateral knees knees, Whole back and  abdomen-   Joint Swelling: YES- bilateral knees  Redness: no  Pain: YES- " 10/10  Warmth: no  Intensity:  severe  Progression of Symptoms:  worsening  Accompanying signs and symptoms:   Fevers: no  Numbness/tingling/weakness: no  History  Trauma to the area: no  Recent illness:  no  Previous similar problem: YES- fibromyalgia   Previous evaluation:  Yes going to a chiropractor.  They want to have her get a new mri of her back.   Precipitating or alleviating factors:  Aggravating factors include: sitting, standing, walking, climbing stairs, lifting, exercise and overuse  Therapies tried and outcome: heat, ice, acetaminophen, emu oil and Tramadol        Review of Systems   Constitutional, HEENT, cardiovascular, pulmonary, gi and gu systems are negative, except as otherwise noted.      Objective    /78   Pulse 90   Temp 96.8  F (36  C)   Resp 18   SpO2 97%   There is no height or weight on file to calculate BMI.  Physical Exam   GENERAL: healthy, alert and no distress  NECK: no adenopathy, no asymmetry, masses, or scars and thyroid normal to palpation  RESP: lungs clear to auscultation - no rales, rhonchi or wheezes  CV: regular rate and rhythm, normal S1 S2, no S3 or S4, no murmur, click or rub, no peripheral edema and peripheral pulses strong  ABDOMEN: soft, , small midline defect old hernia repair mild tenderness, no hepatosplenomegaly, no masses and bowel sounds normal  MS: no gross musculoskeletal defects noted, no edema  SKIN: no suspicious lesions or rashes  NEURO: Normal strength and tone, mentation intact and speech normal    Labs and xray are pending.

## 2021-06-24 ENCOUNTER — ANCILLARY PROCEDURE (OUTPATIENT)
Dept: GENERAL RADIOLOGY | Facility: OTHER | Age: 71
End: 2021-06-24
Attending: PHYSICIAN ASSISTANT
Payer: MEDICARE

## 2021-06-24 ENCOUNTER — OFFICE VISIT (OUTPATIENT)
Dept: FAMILY MEDICINE | Facility: OTHER | Age: 71
End: 2021-06-24
Attending: PHYSICIAN ASSISTANT
Payer: MEDICARE

## 2021-06-24 VITALS
HEART RATE: 90 BPM | RESPIRATION RATE: 18 BRPM | TEMPERATURE: 96.8 F | DIASTOLIC BLOOD PRESSURE: 78 MMHG | OXYGEN SATURATION: 97 % | SYSTOLIC BLOOD PRESSURE: 124 MMHG

## 2021-06-24 DIAGNOSIS — M42.00 SCHEURMANN'S DISEASE: ICD-10-CM

## 2021-06-24 DIAGNOSIS — M79.7 FIBROMYALGIA: ICD-10-CM

## 2021-06-24 DIAGNOSIS — Z78.0 ASYMPTOMATIC POSTMENOPAUSAL ESTROGEN DEFICIENCY: Primary | ICD-10-CM

## 2021-06-24 DIAGNOSIS — Z87.19 H/O HERNIA REPAIR: ICD-10-CM

## 2021-06-24 DIAGNOSIS — M41.55 OTHER SECONDARY SCOLIOSIS, THORACOLUMBAR REGION: ICD-10-CM

## 2021-06-24 DIAGNOSIS — R25.2 MUSCLE CRAMPING: ICD-10-CM

## 2021-06-24 DIAGNOSIS — Z98.890 H/O HERNIA REPAIR: ICD-10-CM

## 2021-06-24 LAB
ALBUMIN SERPL-MCNC: 3.7 G/DL (ref 3.4–5)
ALP SERPL-CCNC: 92 U/L (ref 40–150)
ALT SERPL W P-5'-P-CCNC: 17 U/L (ref 0–50)
ANION GAP SERPL CALCULATED.3IONS-SCNC: 2 MMOL/L (ref 3–14)
AST SERPL W P-5'-P-CCNC: 11 U/L (ref 0–45)
BILIRUB SERPL-MCNC: 0.5 MG/DL (ref 0.2–1.3)
BUN SERPL-MCNC: 16 MG/DL (ref 7–30)
CALCIUM SERPL-MCNC: 8.9 MG/DL (ref 8.5–10.1)
CHLORIDE SERPL-SCNC: 108 MMOL/L (ref 94–109)
CO2 SERPL-SCNC: 30 MMOL/L (ref 20–32)
CREAT SERPL-MCNC: 0.88 MG/DL (ref 0.52–1.04)
GFR SERPL CREATININE-BSD FRML MDRD: 65 ML/MIN/{1.73_M2}
GLUCOSE SERPL-MCNC: 93 MG/DL (ref 70–99)
MAGNESIUM SERPL-MCNC: 2.2 MG/DL (ref 1.6–2.3)
POTASSIUM SERPL-SCNC: 4 MMOL/L (ref 3.4–5.3)
PROT SERPL-MCNC: 7.3 G/DL (ref 6.8–8.8)
SODIUM SERPL-SCNC: 140 MMOL/L (ref 133–144)

## 2021-06-24 PROCEDURE — 99214 OFFICE O/P EST MOD 30 MIN: CPT | Performed by: PHYSICIAN ASSISTANT

## 2021-06-24 PROCEDURE — 36415 COLL VENOUS BLD VENIPUNCTURE: CPT | Mod: ZL | Performed by: PHYSICIAN ASSISTANT

## 2021-06-24 PROCEDURE — G0463 HOSPITAL OUTPT CLINIC VISIT: HCPCS

## 2021-06-24 PROCEDURE — 83735 ASSAY OF MAGNESIUM: CPT | Mod: ZL | Performed by: PHYSICIAN ASSISTANT

## 2021-06-24 PROCEDURE — 80053 COMPREHEN METABOLIC PANEL: CPT | Mod: ZL | Performed by: PHYSICIAN ASSISTANT

## 2021-06-24 PROCEDURE — 72100 X-RAY EXAM L-S SPINE 2/3 VWS: CPT | Mod: TC

## 2021-06-24 PROCEDURE — 72070 X-RAY EXAM THORAC SPINE 2VWS: CPT | Mod: TC

## 2021-06-24 RX ORDER — TRAMADOL HYDROCHLORIDE 50 MG/1
TABLET ORAL
Qty: 60 TABLET | Refills: 0 | Status: SHIPPED | OUTPATIENT
Start: 2021-06-24 | End: 2023-05-08

## 2021-06-24 RX ORDER — DIAZEPAM 5 MG
5 TABLET ORAL EVERY 6 HOURS PRN
Qty: 2 TABLET | Refills: 0 | Status: SHIPPED | OUTPATIENT
Start: 2021-06-24 | End: 2021-07-21

## 2021-06-24 ASSESSMENT — ANXIETY QUESTIONNAIRES
IF YOU CHECKED OFF ANY PROBLEMS ON THIS QUESTIONNAIRE, HOW DIFFICULT HAVE THESE PROBLEMS MADE IT FOR YOU TO DO YOUR WORK, TAKE CARE OF THINGS AT HOME, OR GET ALONG WITH OTHER PEOPLE: SOMEWHAT DIFFICULT
5. BEING SO RESTLESS THAT IT IS HARD TO SIT STILL: NOT AT ALL
4. TROUBLE RELAXING: SEVERAL DAYS
GAD7 TOTAL SCORE: 3
2. NOT BEING ABLE TO STOP OR CONTROL WORRYING: NOT AT ALL
3. WORRYING TOO MUCH ABOUT DIFFERENT THINGS: NOT AT ALL
1. FEELING NERVOUS, ANXIOUS, OR ON EDGE: SEVERAL DAYS
6. BECOMING EASILY ANNOYED OR IRRITABLE: SEVERAL DAYS
7. FEELING AFRAID AS IF SOMETHING AWFUL MIGHT HAPPEN: NOT AT ALL

## 2021-06-24 ASSESSMENT — ASTHMA QUESTIONNAIRES
QUESTION_3 LAST FOUR WEEKS HOW OFTEN DID YOUR ASTHMA SYMPTOMS (WHEEZING, COUGHING, SHORTNESS OF BREATH, CHEST TIGHTNESS OR PAIN) WAKE YOU UP AT NIGHT OR EARLIER THAN USUAL IN THE MORNING: ONCE OR TWICE
QUESTION_4 LAST FOUR WEEKS HOW OFTEN HAVE YOU USED YOUR RESCUE INHALER OR NEBULIZER MEDICATION (SUCH AS ALBUTEROL): ONE OR TWO TIMES PER DAY
ACT_TOTALSCORE: 14
QUESTION_5 LAST FOUR WEEKS HOW WOULD YOU RATE YOUR ASTHMA CONTROL: SOMEWHAT CONTROLLED
QUESTION_1 LAST FOUR WEEKS HOW MUCH OF THE TIME DID YOUR ASTHMA KEEP YOU FROM GETTING AS MUCH DONE AT WORK, SCHOOL OR AT HOME: MOST OF THE TIME
QUESTION_2 LAST FOUR WEEKS HOW OFTEN HAVE YOU HAD SHORTNESS OF BREATH: THREE TO SIX TIMES A WEEK

## 2021-06-24 ASSESSMENT — PATIENT HEALTH QUESTIONNAIRE - PHQ9: SUM OF ALL RESPONSES TO PHQ QUESTIONS 1-9: 7

## 2021-06-24 ASSESSMENT — PAIN SCALES - GENERAL: PAINLEVEL: SEVERE PAIN (7)

## 2021-06-24 NOTE — NURSING NOTE
"Chief Complaint   Patient presents with     Musculoskeletal Problem       Initial /78   Pulse 90   Temp 96.8  F (36  C)   Resp 18   SpO2 97%  Estimated body mass index is 28.77 kg/m  as calculated from the following:    Height as of 7/21/20: 1.613 m (5' 3.5\").    Weight as of 8/19/20: 74.8 kg (165 lb).  Medication Reconciliation: dany Mcdowell  "

## 2021-06-24 NOTE — LETTER
My Asthma Action Plan    Name: Anya Vicente   YOB: 1950  Date: 6/24/2021   My doctor: RADHAMES Bowman   [facility]        My Rescue Medicine:   Albuterol inhaler (Proair/Ventolin/Proventil HFA)  2-4 puffs EVERY 4 HOURS as needed. Use a spacer if recommended by your provider.   My Asthma Severity:   Intermittent / Exercise Induced  Know your asthma triggers: upper respiratory infections  Patient is unaware of triggers          GREEN ZONE   Good Control    I feel good    No cough or wheeze    Can work, sleep and play without asthma symptoms       Take your asthma control medicine every day.     1. If exercise triggers your asthma, take your rescue medication    15 minutes before exercise or sports, and    During exercise if you have asthma symptoms  2. Spacer to use with inhaler: If you have a spacer, make sure to use it with your inhaler             YELLOW ZONE Getting Worse  I have ANY of these:    I do not feel good    Cough or wheeze    Chest feels tight    Wake up at night   1. Keep taking your Green Zone medications  2. Start taking your rescue medicine:    every 20 minutes for up to 1 hour. Then every 4 hours for 24-48 hours.  3. If you stay in the Yellow Zone for more than 12-24 hours, contact your doctor.  4. If you do not return to the Green Zone in 12-24 hours or you get worse, start taking your oral steroid medicine if prescribed by your provider.           RED ZONE Medical Alert - Get Help  I have ANY of these:    I feel awful    Medicine is not helping    Breathing getting harder    Trouble walking or talking    Nose opens wide to breathe       1. Take your rescue medicine NOW  2. If your provider has prescribed an oral steroid medicine, start taking it NOW  3. Call your doctor NOW  4. If you are still in the Red Zone after 20 minutes and you have not reached your doctor:    Take your rescue medicine again and    Call 911 or go to the  emergency room right away    See your regular doctor within 2 weeks of an Emergency Room or Urgent Care visit for follow-up treatment.          Annual Reminders:  Meet with Asthma Educator,  Flu Shot in the Fall, consider Pneumonia Vaccination for patients with asthma (aged 19 and older).    Pharmacy:    [id number] - [place], [place] - [address] [id number] - [place], [place] - [address] AT [facility] [address]    Electronically signed by RADHAMES Bowman   Date: 06/24/21                    Asthma Triggers  How To Control Things That Make Your Asthma Worse    Triggers are things that make your asthma worse.  Look at the list below to help you find your triggers and   what you can do about them. You can help prevent asthma flare-ups by staying away from your triggers.      Trigger                                                          What you can do   Cigarette Smoke  Tobacco smoke can make asthma worse. Do not allow smoking in your home, car or around you.  Be sure no one smokes at a child s day care or school.  If you smoke, ask your health care provider for ways to help you quit.  Ask family members to quit too.  Ask your health care provider for a referral to Quit Plan to help you quit smoking, or call 0-306-906-PLAN.     Colds, Flu, Bronchitis  These are common triggers of asthma. Wash your hands often.  Don t touch your eyes, nose or mouth.  Get a flu shot every year.     Dust Mites  These are tiny bugs that live in cloth or carpet. They are too small to see. Wash sheets and blankets in hot water every week.   Encase pillows and mattress in dust mite proof covers.  Avoid having carpet if you can. If you have carpet, vacuum weekly.   Use a dust mask and HEPA vacuum.   Pollen and Outdoor Mold  Some people are allergic to trees, grass, or weed pollen, or molds. Try to keep your windows closed.  Limit time out doors when pollen count is high.   Ask you health care  provider about taking medicine during allergy season.     Animal Dander  Some people are allergic to skin flakes, urine or saliva from pets with fur or feathers. Keep pets with fur or feathers out of your home.    If you can t keep the pet outdoors, then keep the pet out of your bedroom.  Keep the bedroom door closed.  Keep pets off cloth furniture and away from stuffed toys.     Mice, Rats, and Cockroaches  Some people are allergic to the waste from these pests.   Cover food and garbage.  Clean up spills and food crumbs.  Store grease in the refrigerator.   Keep food out of the bedroom.   Indoor Mold  This can be a trigger if your home has high moisture. Fix leaking faucets, pipes, or other sources of water.   Clean moldy surfaces.  Dehumidify basement if it is damp and smelly.   Smoke, Strong Odors, and Sprays  These can reduce air quality. Stay away from strong odors and sprays, such as perfume, powder, hair spray, paints, smoke incense, paint, cleaning products, candles and new carpet.   Exercise or Sports  Some people with asthma have this trigger. Be active!  Ask your doctor about taking medicine before sports or exercise to prevent symptoms.    Warm up for 5-10 minutes before and after sports or exercise.     Other Triggers of Asthma  Cold air:  Cover your nose and mouth with a scarf.  Sometimes laughing or crying can be a trigger.  Some medicines and food can trigger asthma.

## 2021-06-25 ASSESSMENT — ANXIETY QUESTIONNAIRES: GAD7 TOTAL SCORE: 3

## 2021-06-25 ASSESSMENT — ASTHMA QUESTIONNAIRES: ACT_TOTALSCORE: 14

## 2021-07-15 ENCOUNTER — HOSPITAL ENCOUNTER (OUTPATIENT)
Dept: MRI IMAGING | Facility: HOSPITAL | Age: 71
End: 2021-07-15
Attending: PHYSICIAN ASSISTANT
Payer: MEDICARE

## 2021-07-15 ENCOUNTER — HOSPITAL ENCOUNTER (OUTPATIENT)
Dept: BONE DENSITY | Facility: HOSPITAL | Age: 71
End: 2021-07-15
Attending: PHYSICIAN ASSISTANT
Payer: MEDICARE

## 2021-07-15 DIAGNOSIS — M42.00 SCHEURMANN'S DISEASE: ICD-10-CM

## 2021-07-15 DIAGNOSIS — M41.55 OTHER SECONDARY SCOLIOSIS, THORACOLUMBAR REGION: ICD-10-CM

## 2021-07-15 DIAGNOSIS — Z78.0 ASYMPTOMATIC POSTMENOPAUSAL ESTROGEN DEFICIENCY: ICD-10-CM

## 2021-07-15 DIAGNOSIS — R25.2 MUSCLE CRAMPING: ICD-10-CM

## 2021-07-15 PROCEDURE — 72148 MRI LUMBAR SPINE W/O DYE: CPT

## 2021-07-15 PROCEDURE — 72146 MRI CHEST SPINE W/O DYE: CPT

## 2021-07-15 PROCEDURE — 77080 DXA BONE DENSITY AXIAL: CPT

## 2021-07-21 ENCOUNTER — OFFICE VISIT (OUTPATIENT)
Dept: SURGERY | Facility: OTHER | Age: 71
End: 2021-07-21
Attending: PHYSICIAN ASSISTANT
Payer: COMMERCIAL

## 2021-07-21 VITALS
WEIGHT: 165 LBS | TEMPERATURE: 97.5 F | BODY MASS INDEX: 28.77 KG/M2 | HEART RATE: 79 BPM | OXYGEN SATURATION: 100 % | DIASTOLIC BLOOD PRESSURE: 66 MMHG | SYSTOLIC BLOOD PRESSURE: 128 MMHG

## 2021-07-21 DIAGNOSIS — Z87.19 H/O HERNIA REPAIR: ICD-10-CM

## 2021-07-21 DIAGNOSIS — Z98.890 H/O HERNIA REPAIR: ICD-10-CM

## 2021-07-21 PROCEDURE — 99203 OFFICE O/P NEW LOW 30 MIN: CPT | Performed by: SURGERY

## 2021-07-21 PROCEDURE — G0463 HOSPITAL OUTPT CLINIC VISIT: HCPCS

## 2021-07-21 ASSESSMENT — PAIN SCALES - GENERAL: PAINLEVEL: NO PAIN (0)

## 2021-07-21 NOTE — PATIENT INSTRUCTIONS
Thank you for allowing Dr. Nova and our surgical team to participate in your care. Please call our health unit coordinator at [phone] with scheduling questions or the nurse at [phone] with any other questions or concerns.

## 2021-07-21 NOTE — NURSING NOTE
"Chief Complaint   Patient presents with     Consult For     small defect, midline abdomen at old hernia repair site. history of laparoscopic ventral hernia repair 2014.        Initial /66 (BP Location: Left arm, Patient Position: Sitting, Cuff Size: Adult Regular)   Pulse 79   Temp 97.5  F (36.4  C) (Tympanic)   Wt 74.8 kg (165 lb)   SpO2 100%   BMI 28.77 kg/m   Estimated body mass index is 28.77 kg/m  as calculated from the following:    Height as of 7/21/20: 1.613 m (5' 3.5\").    Weight as of this encounter: 74.8 kg (165 lb).  Medication Reconciliation: complete  Monica Og LPN  "

## 2021-07-28 NOTE — PROGRESS NOTES
[facility] Surgery Consultation    CC:  Abdominal pain     HPI:  This 71 year old year old female is seen at the request of Cindy Danielle for evaluation of abdominal pain. She reports pain in the LUQ of her abdomen, going on several months. Not worse with eating, denies nausea or vomiting. Worse with activity. History of lap ventral hernia in 2014 concern about recurrence. Does have chronic back pain as well.      Past Medical History:   Diagnosis Date     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 02/22/2011     Depression 01/01/2011     Esophageal reflux 02/22/2011     Fibromyalgia 03/18/2005     Insomnia 8/27/2014     Irritable bowel syndrome 02/22/2011    has mild prolapse last echo 9/06     Menopausal or female climacteric states 02/21/2007     Mitral valve disorders(424.0) 02/22/2011    has mild prolapse last echo 9/06     Myalgia and myositis, unspecified 02/25/2008     Narcolepsy      Other affections of shoulder region, not elsewhere 10/01/2008     Other follow-up examination(V67.59) 03/18/2005     Other screening mammogram 09/29/2006     Other specified pre-operative examination 05/04/2005     Posttraumatic stress disorder 02/22/2011     Restless legs syndrome (RLS) 02/22/2011     Sebaceous cyst 08/19/2005     Tourette's disorder 02/22/2011     Ventral hernia 09/15/2011       Past Surgical History:   Procedure Laterality Date     BILATERAL CATARACT EXTRACTION  2006 01/01/2011     Bunion Surgery > RT  2010     COLONOSCOPY  12/23/2013    Procedure: COLONOSCOPY;  COLONOSCOPY;  Surgeon: Kasia Enrique DO;  Location: [facility] OR     D&C  1991 01/01/2011     deviated septum repair  2012     ECHOCARDIOGRAM  2006 01/01/2011     HEMORRHOIDECTOMY  1979    01/01/2011     HYSTERECTOMY  1995    01/01/2011     LAPAROSCOPIC HERNIORRHAPHY VENTRAL  1/16/2014    Procedure: LAPAROSCOPIC HERNIORRHAPHY VENTRAL;  LAPAROSCOPIC VENTRAL HERNIA REPAIR W/ MESH;  Surgeon: Kasia Enrique DO;  Location: [facility] OR      "leg repair after fx  >LT  2009     RELEASE CARPAL TUNNEL  2010    RT     TRIGGER  THUMB RELEASE  2011       Allergies   Allergen Reactions     Acetaminophen      Darvocet-N 100       Aluminum      Clonidine      Dropped BP Drastically     Codamine Itching and Other (See Comments)     \"Buzzy\"     Codeine      Hydrocodone      \"Patient states can take liquid med but not pill\".     Lyrica Headache, Itching and Swelling     Meperidine      Oxycodone-Aspirin Itching, Nausea and Vomiting and Other (See Comments)     \"Buzzy\"     Pimozide Other (See Comments)     Drowsiness     Propoxyphene Napsylate      Darvocet-N 100     Seasonal Allergies Other (See Comments)     Seasonal allergies.  Per 7/3/07, runny nose, stuffy, plugged ears.       Current Outpatient Medications   Medication     albuterol (PROAIR HFA/PROVENTIL HFA/VENTOLIN HFA) 108 (90 Base) MCG/ACT inhaler     amphetamine-dextroamphetamine (ADDERALL) 20 MG tablet     Calcium Carbonate-Vit D-Min (CALCIUM 1200 PO)     cholecalciferol (VITAMIN D3) 1250 mcg (10972 units) capsule     cyclobenzaprine (FLEXERIL) 5 MG tablet     docusate sodium (COLACE) 100 MG capsule     hydrochlorothiazide (HYDRODIURIL) 50 MG tablet     ipratropium - albuterol 0.5 mg/2.5 mg/3 mL (DUONEB) 0.5-2.5 (3) MG/3ML neb solution     Magnesium Oxide 500 MG CAPS     ORDER FOR DME     pramipexole (MIRAPEX) 0.5 MG tablet     predniSONE (DELTASONE) 20 MG tablet     traMADol (ULTRAM) 50 MG tablet     zolpidem (AMBIEN) 5 MG tablet     Current Facility-Administered Medications   Medication     albuterol (PROAIR HFA/PROVENTIL HFA/VENTOLIN HFA) 108 (90 Base) MCG/ACT inhaler 2 puff       HABITS:    Social History     Tobacco Use     Smoking status: Never Smoker     Smokeless tobacco: Never Used   Substance Use Topics     Alcohol use: Yes     Comment: RARELY     Drug use: No       Family History   Problem Relation Age of Onset     Alcohol/Drug Father 42        Motor Vehicle Accident due to Alcoholism - Cause " of Death     Diabetes Maternal Uncle      Parkinsonism Maternal Uncle      Depression Mother      Hypertension Mother      Cancer Mother         Cervical     Parkinsonism Mother      Other - See Comments Sister         ADD/ADHD     Other - See Comments Sister         Fibromyalgia     Diabetes Sister      Diabetes Sister      Other - See Comments Sister         ADD/ADHD     Other - See Comments Son         ADD/ADHD     Other - See Comments Son         ADD/ADHD       REVIEW OF SYSTEMS:  Ten point review of systems negative except those mentioned in the HPI.     OBJECTIVE:    /66 (BP Location: Left arm, Patient Position: Sitting, Cuff Size: Adult Regular)   Pulse 79   Temp 97.5  F (36.4  C) (Tympanic)   Wt 74.8 kg (165 lb)   SpO2 100%   BMI 28.77 kg/m      GENERAL: Generally appears well, in no distress with appropriate affect.  HEENT:   Sclerae anicteric - normocephalic atraumatic   Respiratory:  No acute distress, no splinting   Cardiovascular:  Regular Rate and Rhythm  Abdomen: no bulge with valsalva, no defect, point tender in the LUQ.   :  deferred  Extremities:  Extremities normal. No deformities, edema, or skin discoloration.  Skin:  no suspicious lesions or rashes  Neurological: grossly intact  Psych:  Alert, oriented, affect appropriate with normal decision making ability.    IMPRESSION:    71 y.o. female with history of a laparoscopic ventral hernia repair in 2014. I have reviewed the perative report from this time and it appears to be a epigastric hernia that was quite small. This does not correlate to location of patients pain. I can not palpate a fascial defect either. Do not feel she warrants laparoscopic evaluation. Will get CT abdomen and pelvis without IV contrast to look for possible port site hernia or occult recurrence.     PLAN:    CT abdomen and pelvis     Rachid Nova MD,     7/28/2021  8:19 AM

## 2021-08-05 ENCOUNTER — HOSPITAL ENCOUNTER (OUTPATIENT)
Dept: CT IMAGING | Facility: HOSPITAL | Age: 71
Discharge: HOME | End: 2021-08-05
Attending: SURGERY | Admitting: SURGERY
Payer: MEDICARE

## 2021-08-05 DIAGNOSIS — Z98.890 H/O HERNIA REPAIR: ICD-10-CM

## 2021-08-05 DIAGNOSIS — Z87.19 H/O HERNIA REPAIR: ICD-10-CM

## 2021-08-05 PROCEDURE — 74176 CT ABD & PELVIS W/O CONTRAST: CPT

## 2021-10-03 ENCOUNTER — HEALTH MAINTENANCE LETTER (OUTPATIENT)
Age: 71
End: 2021-10-03

## 2021-11-14 DIAGNOSIS — J98.01 BRONCHOSPASM: ICD-10-CM

## 2021-11-15 RX ORDER — ALBUTEROL SULFATE 90 UG/1
AEROSOL, METERED RESPIRATORY (INHALATION)
Qty: 18 G | Refills: 0 | Status: SHIPPED | OUTPATIENT
Start: 2021-11-15 | End: 2022-01-17

## 2021-11-15 NOTE — TELEPHONE ENCOUNTER
Albuterol       Last Written Prescription Date:  12/7/2020  Last Fill Quantity: 8.5,   # refills: 0  Last Office Visit: 6/24/2021  Future Office visit:

## 2022-01-14 DIAGNOSIS — J45.901 MILD ASTHMA WITH ACUTE EXACERBATION, UNSPECIFIED WHETHER PERSISTENT: ICD-10-CM

## 2022-01-14 DIAGNOSIS — J98.01 BRONCHOSPASM: ICD-10-CM

## 2022-01-17 RX ORDER — PREDNISONE 20 MG/1
TABLET ORAL
Qty: 21 TABLET | Refills: 0 | Status: SHIPPED | OUTPATIENT
Start: 2022-01-17

## 2022-01-17 RX ORDER — IPRATROPIUM BROMIDE AND ALBUTEROL SULFATE 2.5; .5 MG/3ML; MG/3ML
SOLUTION RESPIRATORY (INHALATION)
Qty: 90 ML | Refills: 0 | Status: SHIPPED | OUTPATIENT
Start: 2022-01-17 | End: 2023-06-21

## 2022-01-17 RX ORDER — ALBUTEROL SULFATE 90 UG/1
AEROSOL, METERED RESPIRATORY (INHALATION)
Qty: 8.5 G | Refills: 0 | Status: SHIPPED | OUTPATIENT
Start: 2022-01-17 | End: 2023-06-21

## 2022-01-17 NOTE — TELEPHONE ENCOUNTER
Albuterol, Duoneb, Prednisone      Last Written Prescription Date:  11.15.21, 7.8.20, 1.30.20  Last Fill Quantity: #8.5, 90mL, 21,   # refills: 0  Last Office Visit: 6.24.21  Future Office visit:       Routing refill request to provider for review/approval because:  Drug not on the [facility] or [facility] refill protocol or controlled substance

## 2022-01-23 ENCOUNTER — HEALTH MAINTENANCE LETTER (OUTPATIENT)
Age: 72
End: 2022-01-23

## 2022-03-02 ENCOUNTER — NURSE TRIAGE (OUTPATIENT)
Dept: FAMILY MEDICINE | Facility: OTHER | Age: 72
End: 2022-03-02
Payer: COMMERCIAL

## 2022-03-02 NOTE — TELEPHONE ENCOUNTER
"Call from patient reporting dizziness starting x 4 days ago. Requesting appointment with Cindy Danielle.     Patient reports dizziness as being severe, unable to get out of bed,  assists patient to ambulate.     Unable to reach Cindy Danielle or Nurse.     Per protocol patient advised to go to the ED/UC to be evaluated.     Patient states, \"do you know how expensive and emergency room visit is, no I am not going to the emergency room\".    This writer notified patient, will try to reach PCP and patient stating, \"I will just take antihistamine for another day\" and hung up the phone.     Reason for Disposition    SEVERE dizziness (e.g., unable to stand, requires support to walk, feels like passing out now)    Additional Information    Negative: Shock suspected (e.g., cold/pale/clammy skin, too weak to stand, low BP, rapid pulse)    Negative: Difficult to awaken or acting confused (e.g., disoriented, slurred speech)    Negative: Fainted, and still feels dizzy afterwards    Negative: Severe difficulty breathing (e.g., struggling for each breath, speaks in single words)    Negative: Overdose (accidental or intentional) of medications    Negative: New neurologic deficit that is present now: * Weakness of the face, arm, or leg on one side of the body * Numbness of the face, arm, or leg on one side of the body * Loss of speech or garbled speech    Negative: Heart beating < 50 beats per minute OR > 140 beats per minute    Negative: Sounds like a life-threatening emergency to the triager    Negative: Chest pain    Negative: Rectal bleeding, bloody stool, or tarry-black stool    Negative: Vomiting is the main symptom    Negative: Diarrhea is the main symptom    Negative: Headache is the main symptom    Negative: Heat exhaustion suspected (i.e., dehydration from heat exposure)    Negative: Patient states that he/she is having an anxiety/panic attack    Answer Assessment - Initial Assessment Questions  1. DESCRIPTION: " "\"Describe your dizziness.\"      Room is spinning    2. LIGHTHEADED: \"Do you feel lightheaded?\" (e.g., somewhat faint, woozy, weak upon standing)      Yes, feeling woozy, lightheaded     3. VERTIGO: \"Do you feel like either you or the room is spinning or tilting?\" (i.e. vertigo)      Feels like the room is spinning    4. SEVERITY: \"How bad is it?\"  \"Do you feel like you are going to faint?\" \"Can you stand and walk?\"    - MILD - walking normally    - MODERATE - interferes with normal activities (e.g., work, school)     - SEVERE - unable to stand, requires support to walk, feels like passing out now.       Severe    5. ONSET:  \"When did the dizziness begin?\"      4 days ago     6. AGGRAVATING FACTORS: \"Does anything make it worse?\" (e.g., standing, change in head position)      Worse with standing and changing position of head. Been in bed most of the times as everything is spinning    7. HEART RATE: \"Can you tell me your heart rate?\" \"How many beats in 15 seconds?\"  (Note: not all patients can do this)          8. CAUSE: \"What do you think is causing the dizziness?\"      Unknown     9. RECURRENT SYMPTOM: \"Have you had dizziness before?\" If so, ask: \"When was the last time?\" \"What happened that time?\"      No    10. OTHER SYMPTOMS: \"Do you have any other symptoms?\" (e.g., fever, chest pain, vomiting, diarrhea, bleeding)        No     11. PREGNANCY: \"Is there any chance you are pregnant?\" \"When was your last menstrual period?\"        No    Protocols used: DIZZINESS-A-OH      "

## 2022-03-03 ENCOUNTER — APPOINTMENT (OUTPATIENT)
Dept: CT IMAGING | Facility: HOSPITAL | Age: 72
End: 2022-03-03
Attending: NURSE PRACTITIONER
Payer: MEDICARE

## 2022-03-03 ENCOUNTER — HOSPITAL ENCOUNTER (EMERGENCY)
Facility: HOSPITAL | Age: 72
Discharge: HOME | End: 2022-03-03
Attending: NURSE PRACTITIONER | Admitting: NURSE PRACTITIONER
Payer: MEDICARE

## 2022-03-03 VITALS
BODY MASS INDEX: 29.23 KG/M2 | DIASTOLIC BLOOD PRESSURE: 80 MMHG | OXYGEN SATURATION: 99 % | HEART RATE: 79 BPM | SYSTOLIC BLOOD PRESSURE: 131 MMHG | RESPIRATION RATE: 20 BRPM | HEIGHT: 63 IN | TEMPERATURE: 97.3 F

## 2022-03-03 DIAGNOSIS — R42 VERTIGO: ICD-10-CM

## 2022-03-03 DIAGNOSIS — N39.0 URINARY TRACT INFECTIOUS DISEASE: ICD-10-CM

## 2022-03-03 LAB
ALBUMIN UR-MCNC: 10 MG/DL
ANION GAP SERPL CALCULATED.3IONS-SCNC: 4 MMOL/L (ref 3–14)
APPEARANCE UR: CLEAR
BASOPHILS # BLD AUTO: 0.1 10E3/UL (ref 0–0.2)
BASOPHILS NFR BLD AUTO: 1 %
BILIRUB UR QL STRIP: NEGATIVE
BUN SERPL-MCNC: 16 MG/DL (ref 7–30)
CALCIUM SERPL-MCNC: 9 MG/DL (ref 8.5–10.1)
CHLORIDE BLD-SCNC: 112 MMOL/L (ref 94–109)
CO2 SERPL-SCNC: 27 MMOL/L (ref 20–32)
COLOR UR AUTO: YELLOW
CREAT SERPL-MCNC: 1.01 MG/DL (ref 0.52–1.04)
EOSINOPHIL # BLD AUTO: 0.1 10E3/UL (ref 0–0.7)
EOSINOPHIL NFR BLD AUTO: 2 %
ERYTHROCYTE [DISTWIDTH] IN BLOOD BY AUTOMATED COUNT: 13 % (ref 10–15)
GFR SERPL CREATININE-BSD FRML MDRD: 58 ML/MIN/1.73M2
GLUCOSE BLD-MCNC: 105 MG/DL (ref 70–99)
GLUCOSE UR STRIP-MCNC: NEGATIVE MG/DL
HCT VFR BLD AUTO: 45.1 % (ref 35–47)
HGB BLD-MCNC: 14.3 G/DL (ref 11.7–15.7)
HGB UR QL STRIP: NEGATIVE
HOLD SPECIMEN: NORMAL
IMM GRANULOCYTES # BLD: 0 10E3/UL
IMM GRANULOCYTES NFR BLD: 0 %
KETONES UR STRIP-MCNC: NEGATIVE MG/DL
LEUKOCYTE ESTERASE UR QL STRIP: ABNORMAL
LYMPHOCYTES # BLD AUTO: 1.8 10E3/UL (ref 0.8–5.3)
LYMPHOCYTES NFR BLD AUTO: 31 %
MCH RBC QN AUTO: 30.9 PG (ref 26.5–33)
MCHC RBC AUTO-ENTMCNC: 31.7 G/DL (ref 31.5–36.5)
MCV RBC AUTO: 97 FL (ref 78–100)
MONOCYTES # BLD AUTO: 0.5 10E3/UL (ref 0–1.3)
MONOCYTES NFR BLD AUTO: 8 %
MUCOUS THREADS #/AREA URNS LPF: PRESENT /LPF
NEUTROPHILS # BLD AUTO: 3.4 10E3/UL (ref 1.6–8.3)
NEUTROPHILS NFR BLD AUTO: 58 %
NITRATE UR QL: NEGATIVE
NRBC # BLD AUTO: 0 10E3/UL
NRBC BLD AUTO-RTO: 0 /100
PH UR STRIP: 5.5 [PH] (ref 4.7–8)
PLATELET # BLD AUTO: 357 10E3/UL (ref 150–450)
POTASSIUM BLD-SCNC: 3.7 MMOL/L (ref 3.4–5.3)
RBC # BLD AUTO: 4.63 10E6/UL (ref 3.8–5.2)
RBC URINE: <1 /HPF
SODIUM SERPL-SCNC: 143 MMOL/L (ref 133–144)
SP GR UR STRIP: 1.03 (ref 1–1.03)
SQUAMOUS EPITHELIAL: 0 /HPF
UROBILINOGEN UR STRIP-MCNC: NORMAL MG/DL
WBC # BLD AUTO: 5.8 10E3/UL (ref 4–11)
WBC URINE: 3 /HPF

## 2022-03-03 PROCEDURE — 82310 ASSAY OF CALCIUM: CPT | Performed by: NURSE PRACTITIONER

## 2022-03-03 PROCEDURE — 36415 COLL VENOUS BLD VENIPUNCTURE: CPT | Performed by: NURSE PRACTITIONER

## 2022-03-03 PROCEDURE — 70496 CT ANGIOGRAPHY HEAD: CPT

## 2022-03-03 PROCEDURE — 99285 EMERGENCY DEPT VISIT HI MDM: CPT | Mod: 25

## 2022-03-03 PROCEDURE — 85025 COMPLETE CBC W/AUTO DIFF WBC: CPT | Performed by: NURSE PRACTITIONER

## 2022-03-03 PROCEDURE — 250N000011 HC RX IP 250 OP 636: Performed by: RADIOLOGY

## 2022-03-03 PROCEDURE — 99284 EMERGENCY DEPT VISIT MOD MDM: CPT | Performed by: NURSE PRACTITIONER

## 2022-03-03 PROCEDURE — 81001 URINALYSIS AUTO W/SCOPE: CPT | Performed by: NURSE PRACTITIONER

## 2022-03-03 PROCEDURE — 70450 CT HEAD/BRAIN W/O DYE: CPT

## 2022-03-03 RX ORDER — MECLIZINE HCL 12.5 MG 12.5 MG/1
12.5 TABLET ORAL 4 TIMES DAILY PRN
Qty: 30 TABLET | Refills: 0 | Status: SHIPPED | OUTPATIENT
Start: 2022-03-03 | End: 2022-04-15

## 2022-03-03 RX ORDER — IOPAMIDOL 755 MG/ML
50 INJECTION, SOLUTION INTRAVASCULAR ONCE
Status: COMPLETED | OUTPATIENT
Start: 2022-03-03 | End: 2022-03-03

## 2022-03-03 RX ORDER — CEPHALEXIN 500 MG/1
500 CAPSULE ORAL 2 TIMES DAILY
Qty: 10 CAPSULE | Refills: 0 | Status: SHIPPED | OUTPATIENT
Start: 2022-03-03 | End: 2022-03-08

## 2022-03-03 RX ADMIN — IOPAMIDOL 50 ML: 755 INJECTION, SOLUTION INTRAVENOUS at 13:06

## 2022-03-03 ASSESSMENT — ENCOUNTER SYMPTOMS
ALLERGIC/IMMUNOLOGIC NEGATIVE: 1
PSYCHIATRIC NEGATIVE: 1
DIZZINESS: 1
CONSTITUTIONAL NEGATIVE: 1
MUSCULOSKELETAL NEGATIVE: 1
RESPIRATORY NEGATIVE: 1
CARDIOVASCULAR NEGATIVE: 1
GASTROINTESTINAL NEGATIVE: 1
ENDOCRINE NEGATIVE: 1
HEMATOLOGIC/LYMPHATIC NEGATIVE: 1

## 2022-03-03 NOTE — ED TRIAGE NOTES
Pt presents with dizziness that started 3 days ago. Pt states she also has a headache, neck pain and sinus congestion.     Ellis Laws, MSN, RN on 3/3/2022 at 11:34 AM

## 2022-03-03 NOTE — ED PROVIDER NOTES
"  History     Chief Complaint   Patient presents with     Dizziness     HPI   History of presenting illness given by patient.    Anya Vicente is a 71 year old female who presents to emergency room for evaluation of dizziness over the last 4 days.  She also reports she has had a headache over the last 4 days, has been using antihistamines (Sudafed) to relieve her puffy eyes.  She also reports neck stiffness and pain in her muscles.  Her headaches have been present for 4 days and they will move from the back of her neck to the top of her head to the forehead.  She also has double vision at times, has been bumping into hallways, and bending makes her dizziness worse.  She reports at times she feels like the room is spinning.  She denies any recent fevers, runny nose, sinus pain, pressure, or nasal congestion.    Allergies:  Allergies   Allergen Reactions     Acetaminophen      Darvocet-N 100       Aluminum      Clonidine      Dropped BP Drastically     Codamine Itching and Other (See Comments)     \"Buzzy\"     Codeine      Hydrocodone      \"Patient states can take liquid med but not pill\".     Lyrica Headache, Itching and Swelling     Meperidine      Oxycodone-Aspirin Itching, Nausea and Vomiting and Other (See Comments)     \"Buzzy\"     Pimozide Other (See Comments)     Drowsiness     Propoxyphene Napsylate      Darvocet-N 100     Seasonal Allergies Other (See Comments)     Seasonal allergies.  Per 7/3/07, runny nose, stuffy, plugged ears.       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/24/2021     Priority: Medium     Mixed stress and urge urinary incontinence 12/26/2018     Priority: Medium     Leg cramps 12/26/2018     Priority: Medium     Vitamin D deficiency disease 12/26/2018     Priority: Medium     Major depressive disorder 01/16/2015     Priority: Medium     " Insomnia 08/27/2014     Priority: Medium     Glucose intolerance (impaired glucose tolerance) 11/14/2013     Priority: Medium     Narcolepsy without 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) 07/31/2013     Priority: Medium     Complete rupture of rotator cuff 03/09/2009     Priority: Medium     Glucose intolerance 10/16/2008     Priority: Medium     Overview:   IMO Update 10/11       Esophageal reflux 03/26/2008     Priority: Medium     Pain in joint, ankle and foot 08/21/2007     Priority: Medium     Overview:   IMO Update 10/11       Open bimalleolar fracture 08/16/2007     Priority: Medium     Myalgia and myositis 05/01/2007     Priority: Medium     Overview:   IMO Update 10/11       Other malaise and fatigue 05/01/2007     Priority: Medium     Fibromyalgia 09/09/1984     Priority: Medium        Past Medical History:    Past Medical History:   Diagnosis Date     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 02/22/2011     Depression 01/01/2011     Esophageal reflux 02/22/2011     Fibromyalgia 03/18/2005     Insomnia 8/27/2014     Irritable bowel syndrome 02/22/2011     Menopausal or female climacteric states 02/21/2007     Mitral valve disorders(424.0) 02/22/2011     Myalgia and myositis, unspecified 02/25/2008     Narcolepsy      Other affections of shoulder region, not elsewhere 10/01/2008     Other follow-up examination(V67.59) 03/18/2005     Other screening mammogram 09/29/2006     Other specified pre-operative examination 05/04/2005     Posttraumatic stress disorder 02/22/2011     Restless legs syndrome (RLS) 02/22/2011     Sebaceous cyst 08/19/2005     Tourette's disorder 02/22/2011     Ventral hernia 09/15/2011       Past Surgical History:    Past Surgical History:   Procedure Laterality Date     BILATERAL CATARACT EXTRACTION  2006 01/01/2011     Bunion Surgery > RT  2010     COLONOSCOPY  12/23/2013    Procedure: COLONOSCOPY;  COLONOSCOPY;  Surgeon: Kasia Enrique DO;  Location: [facility]     D&C  1991 01/01/2011      deviated septum repair  2012     ECHOCARDIOGRAM  2006    01/01/2011     HEMORRHOIDECTOMY  1979    01/01/2011     HYSTERECTOMY  1995    01/01/2011     LAPAROSCOPIC HERNIORRHAPHY VENTRAL  1/16/2014    Procedure: LAPAROSCOPIC HERNIORRHAPHY VENTRAL;  LAPAROSCOPIC VENTRAL HERNIA REPAIR W/ MESH;  Surgeon: Kasia Enrique DO;  Location: [facility] OR     leg repair after fx  >LT  2009     RELEASE CARPAL TUNNEL  2010    RT     TRIGGER  THUMB RELEASE  2011       Family History:    Family History   Problem Relation Age of Onset     Alcohol/Drug Father 42        Motor Vehicle Accident due to Alcoholism - Cause of Death     Diabetes Maternal Uncle      Parkinsonism Maternal Uncle      Depression Mother      Hypertension Mother      Cancer Mother         Cervical     Parkinsonism Mother      Other - See Comments Sister         ADD/ADHD     Other - See Comments Sister         Fibromyalgia     Diabetes Sister      Diabetes Sister      Other - See Comments Sister         ADD/ADHD     Other - See Comments Son         ADD/ADHD     Other - See Comments Son         ADD/ADHD       Social History:  Marital Status:   [2]  Social History     Tobacco Use     Smoking status: Never Smoker     Smokeless tobacco: Never Used   Substance Use Topics     Alcohol use: Yes     Comment: RARELY     Drug use: No        Medications:    cephALEXin (KEFLEX) 500 MG capsule  meclizine (ANTIVERT) 12.5 MG tablet  albuterol (PROAIR HFA/PROVENTIL HFA/VENTOLIN HFA) 108 (90 Base) MCG/ACT inhaler  amphetamine-dextroamphetamine (ADDERALL) 20 MG tablet  Calcium Carbonate-Vit D-Min (CALCIUM 1200 PO)  cholecalciferol (VITAMIN D3) 1250 mcg (19882 units) capsule  cyclobenzaprine (FLEXERIL) 5 MG tablet  docusate sodium (COLACE) 100 MG capsule  hydrochlorothiazide (HYDRODIURIL) 50 MG tablet  ipratropium - albuterol 0.5 mg/2.5 mg/3 mL (DUONEB) 0.5-2.5 (3) MG/3ML neb solution  Magnesium Oxide 500 MG CAPS  ORDER FOR DME  pramipexole (MIRAPEX) 0.5 MG tablet  predniSONE  "(DELTASONE) 20 MG tablet  traMADol (ULTRAM) 50 MG tablet  zolpidem (AMBIEN) 5 MG tablet          Review of Systems   Constitutional: Negative.    HENT: Negative.    Eyes: Positive for visual disturbance.        Diplopia   Respiratory: Negative.    Cardiovascular: Negative.    Gastrointestinal: Negative.    Endocrine: Negative.    Genitourinary: Negative.    Musculoskeletal: Negative.    Skin: Negative.    Allergic/Immunologic: Negative.    Neurological: Positive for dizziness.   Hematological: Negative.    Psychiatric/Behavioral: Negative.        Physical Exam   BP: 127/83  Pulse: 81  Temp: 97.3  F (36.3  C)  Resp: 20  Height: 160 cm (5' 3\")  SpO2: 98 %      Physical Exam  Vitals and nursing note reviewed.   Constitutional:       Appearance: Normal appearance. She is normal weight.   HENT:      Head: Normocephalic and atraumatic.      Nose: Nose normal.      Mouth/Throat:      Mouth: Mucous membranes are moist.      Pharynx: Oropharynx is clear.   Eyes:      General: Lids are normal.      Extraocular Movements:      Left eye: Nystagmus present.      Conjunctiva/sclera: Conjunctivae normal.      Pupils: Pupils are equal, round, and reactive to light.   Cardiovascular:      Rate and Rhythm: Normal rate and regular rhythm.      Pulses: Normal pulses.      Heart sounds: Normal heart sounds.   Pulmonary:      Effort: Pulmonary effort is normal.      Breath sounds: Normal breath sounds.   Abdominal:      General: Abdomen is flat. Bowel sounds are normal.      Palpations: Abdomen is soft.   Musculoskeletal:         General: Normal range of motion.      Cervical back: Normal range of motion and neck supple.   Skin:     General: Skin is warm and dry.   Neurological:      General: No focal deficit present.      Mental Status: She is alert and oriented to person, place, and time.      GCS: GCS eye subscore is 4. GCS verbal subscore is 5. GCS motor subscore is 6.      Cranial Nerves: Cranial nerves are intact.      Sensory: " Sensation is intact.      Motor: Motor function is intact.      Coordination: Coordination is intact.      Gait: Gait is intact.         ED Course       Results for orders placed or performed during the hospital encounter of 03/03/22 (from the past 24 hour(s))   CBC with platelets differential    Narrative    The following orders were created for panel order CBC with platelets differential.  Procedure                               Abnormality         Status                     ---------                               -----------         ------                     CBC with platelets and d...[[id number]]                      Final result                 Please view results for these tests on the individual orders.   Basic metabolic panel   Result Value Ref [place]    Sodium 143 133 - 144 mmol/L    Potassium 3.7 3.4 - 5.3 mmol/L    Chloride 112 (H) 94 - 109 mmol/L    Carbon Dioxide (CO2) 27 20 - 32 mmol/L    Anion Gap 4 3 - 14 mmol/L    Urea Nitrogen 16 7 - 30 mg/dL    Creatinine 1.01 0.52 - 1.04 mg/dL    Calcium 9.0 8.5 - 10.1 mg/dL    Glucose 105 (H) 70 - 99 mg/dL    GFR Estimate 58 (L) >60 mL/min/1.73m2   CBC with platelets and differential   Result Value Ref [place]    WBC Count 5.8 4.0 - 11.0 10e3/uL    RBC Count 4.63 3.80 - 5.20 10e6/uL    Hemoglobin 14.3 11.7 - 15.7 g/dL    Hematocrit 45.1 35.0 - 47.0 %    MCV 97 78 - 100 fL    MCH 30.9 26.5 - 33.0 pg    MCHC 31.7 31.5 - 36.5 g/dL    RDW 13.0 10.0 - 15.0 %    Platelet Count 357 150 - 450 10e3/uL    % Neutrophils 58 %    % Lymphocytes 31 %    % Monocytes 8 %    % Eosinophils 2 %    % Basophils 1 %    % Immature Granulocytes 0 %    NRBCs per 100 WBC 0 <1 /100    Absolute Neutrophils 3.4 1.6 - 8.3 10e3/uL    Absolute Lymphocytes 1.8 0.8 - 5.3 10e3/uL    Absolute Monocytes 0.5 0.0 - 1.3 10e3/uL    Absolute Eosinophils 0.1 0.0 - 0.7 10e3/uL    Absolute Basophils 0.1 0.0 - 0.2 10e3/uL    Absolute Immature Granulocytes 0.0 <=0.4 10e3/uL    Absolute NRBCs 0.0 10e3/uL   UA  with Microscopic reflex to Culture    Specimen: Urine, Midstream   Result Value Ref [place]    Color Urine Yellow Colorless, Straw, Light Yellow, Yellow    Appearance Urine Clear Clear    Glucose Urine Negative Negative mg/dL    Bilirubin Urine Negative Negative    Ketones Urine Negative Negative mg/dL    Specific Gravity Urine 1.026 1.003 - 1.035    Blood Urine Negative Negative    pH Urine 5.5 4.7 - 8.0    Protein Albumin Urine 10  (A) Negative mg/dL    Urobilinogen Urine Normal Normal, 2.0 mg/dL    Nitrite Urine Negative Negative    Leukocyte Esterase Urine Small (A) Negative    Mucus Urine Present (A) None Seen /LPF    RBC Urine <1 <=2 /HPF    WBC Urine 3 <=5 /HPF    Squamous Epithelials Urine 0 <=1 /HPF    Narrative    Urine Culture not indicated   Head CT w/o contrast    Narrative    PROCEDURE: CT HEAD W/O CONTRAST     HISTORY: Dizziness, non-specific.    COMPARISON: None.    TECHNIQUE:  Helical images of the head from the foramen magnum to the  vertex were obtained without contrast.    FINDINGS: The ventricles and sulci are normal in volume. No acute  intracranial hemorrhage, mass effect, midline shift, hydrocephalus or  basilar cystern effacement are present.    Matter low density in both hemispheres consistent with small vessel  changes.    The calvarium is intact. The mastoid air cells are clear.  The  visualized paranasal sinuses are clear.      Impression    IMPRESSION: No acute brain abnormality.      CELIA VALENTIN MD         SYSTEM ID:  [id number]   CTA Head Neck with Contrast    Narrative    CT ANGIOGRAPHY OF THE BRAIN AND NECK    HISTORY: . Dizziness, non-specific.    TECHNIQUE: Following the administration of intravenous contrast, thin  helical CT angiography images of the brain were obtained.   Postcontrast helical thin CT angiography images of the neck were  obtained.  NASCET criteria were applied. Source, multiplanar and MIP  reformatted images were reviewed.     COMPARISON:  None.    FINDINGS:    CTA Brain:      Atherosclerotic calcification is seen in the cavernous carotids. The  petrous, cavernous, and supraclinoid internal carotid arteries  demonstrate no high-grade, flow limiting stenosis.    The A1 segments are symmetric. An anterior communicating artery is  present. The distal ELLIOT vessels are unremarkable. The M1 segments, MCA  bifurcations and distal MCA vessels are patent.    The left vertebral artery functionally terminates in the left PICA.  The basilar artery is normal in caliber. The PCA and SCA branches  demonstrate no focal abnormalities.      CTA Neck:     A 3 vessel aortic arch is present. The innominate and bilateral  subclavian arteries are grossly patent.    The common carotid arteries demonstrate preserved caliber. The carotid  bulbs demonstrate no measurable stenosis. The bilateral internal  carotid arteries demonstrate no evidence of flow-limiting stenosis or  occlusion.    The vertebral arteries arise from the subclavian arteries. The right  vertebral artery is dominant. There is no evidence of flow-limiting  stenosis or occlusion of the vertebral arteries.    No mass or pneumothorax is seen at the apices. Emphysema is  questioned. Multilevel degenerative changes are seen in the cervical  spine.      Impression    IMPRESSION:    No intracranial arterial occlusion, flow-limiting stenosis or  aneurysm.    No evidence of flow-limiting stenosis, dissection, or occlusion of the  cervical carotid or vertebral arteries.      JESS HOLLOWAY MD         SYSTEM ID:  [id number]       Medications   iopamidol (ISOVUE-370) solution 50 mL (50 mLs Intravenous Given 3/3/22 1306)   sodium chloride (PF) 0.9% PF flush 100 mL (100 mLs Intravenous Given 3/3/22 1306)       Assessments & Plan (with Medical Decision Making)   Findings as above.  71 year old female who presents to emergency room for evaluation of dizziness over the last 4 days.  She also reports she has had a headache  over the last 4 days, has been using antihistamines (Sudafed) to relieve her puffy eyes.  She also reports neck stiffness and pain in her muscles.  Her headaches have been present for 4 days and they will move from the back of her neck to the top of her head to the forehead.  She also has double vision at times, has been bumping into hallways, and bending makes her dizziness worse.  She reports at times she feels like the room is spinning.  Labs are all within normal limits and very assuring.  UA does show small amount of leukoesterase, I will treat for acute cystitis.  CT results shows no intracranial or arterial occlusion, flow-limiting stenosis or aneurysms.  No evidence of flow-limiting stenosis, dissection, or occlusion of the cervical carotid or vertebral arteries.    Discharge plan: I discussed with patient lab and imaging results.  We discussed the discharge plan she will follow-up with her primary care provider for further evaluation of her dizziness.  I also discussed that this could be viral related, or could be Ménière's related as well.  Patient will use meclizine as needed for dizziness.  Patient has been instructed if her symptoms worsen or she develops any new concerning symptoms to return to ER.    I have reviewed the nursing notes.    I have reviewed the findings, diagnosis, plan and need for follow up with the patient.      Discharge Medication List as of 3/3/2022  2:12 PM      START taking these medications    Details   cephALEXin (KEFLEX) 500 MG capsule Take 1 capsule (500 mg) by mouth 2 times daily for 5 days, Disp-10 capsule, R-0, Local Print             Final diagnoses:   Vertigo   Urinary tract infectious disease       3/3/2022   [facility]     Gardenia Mcdonald APRN CNP  03/04/22 0041

## 2022-03-03 NOTE — DISCHARGE INSTRUCTIONS
What to expect when you have contrast    During your exam, we will inject  contrast  into your vein or artery. (Contrast is a clear liquid with iodine in it. It shows up on X-rays.)    You may feel warm or hot. You may have a metal taste in your mouth and a slight upset stomach. You may also feel pressure near the kidneys and bladder. These effects will last about 1 to 3 minutes.    Please tell us if you have:    Sneezing     Itching    Hives     Swelling in the face    A hoarse voice    Breathing problems    Other new symptoms    Serious problems are rare.  They may include:    Irregular heartbeat     Seizures    Kidney failure              Tissue damage    Shock      Death    If you have any problems during the exam, we  will treat them right away.    When you get home    Call your hospital if you have any new symptoms in the next 2 days, like hives or swelling. (Phone numbers are at the bottom of this page.) Or call your family doctor.     If you have wheezing or trouble breathing, call 911.    Self-care  -Drink at least 4 extra glasses of water today.   This reduces the stress on your kidneys.  -Keep taking your regular medicines.    The contrast will pass out of your body in your  Urine(pee). This will happen in the next 24 hours. You  will not feel this. Your urine will not  change color.    If you have kidney problems or take metformin    Drink 4 to 8 large glasses of water for the next  2 days, if you are not on a fluid restriction.    ?If you take metformin (Glucophage or Glucovance) for diabetes, keep taking it.      ?Your kidney function tests are abnormal.  If you take Metformin, do not take it for 48 hours. Please go to your clinic for a blood test within 3 days after your exam before the restarting this medicine.     (Note to provider:please give patient prescription for lab tests.)    ?Special instructions: -    I have read and understand the above information.    Patient Sign  Here:______________________________________Date:________Time:______    Staff Sign Here:________________________________________Date:_______Time:______      Radiology Departments:     ?[facility]: [phone] ?[place]: [phone]     ?[place]: [phone] ?[facility]:[phone]      ?[place]: [phone]  ?[place]: [phone]  ?[place]:[phone]    ?[facility]:[phone]  ?[facility]:[phone] you for choosing [facility] for your healthcare needs today.  For your dizziness, please use your meclizine as directed and as needed for dizziness.  Follow-up with your primary care provider for further evaluation of your dizziness.

## 2022-03-04 ENCOUNTER — TELEPHONE (OUTPATIENT)
Dept: FAMILY MEDICINE | Facility: OTHER | Age: 72
End: 2022-03-04
Payer: COMMERCIAL

## 2022-03-04 NOTE — TELEPHONE ENCOUNTER
Emergency Department and Urgent Care Follow-up      Reason for ER/UC visit: dizziness  o Date seen: 3/3/22      New or Worsening symptoms:  No        Prescription Received/Picked up from Pharmacy?: yes   o Medications started? Keflex and meclizine   o Any questions or issues regarding your prescription?:       Follow-up Results or Labs that are pending: no       Questions or concerns?: no       ER Recommends Follow-up by: within 1 week       RN Recommendations: continue with prescribed medication from UC provider  o Appointment scheduled:   o Please reach out to patient for appointment - no openings with Cindy Danielle within a week.   o Patient can be reached at [phone]    If you start feeling worse, or have any further questions, please feel free to contact Nurse Triage at [phone].  If needing immediate medical attention at any time please call 911/Go to the ER.

## 2022-03-09 NOTE — PROGRESS NOTES
Assessment & Plan     Benign paroxysmal positional vertigo due to bilateral vestibular disorder  She has been very dizzy. Was in ER.  Chilo needing PT.  It is only occasionally positional. Double vision is gone.  meclozine not helping. Changes to Valium 2 mg tid as needed.   - Physical Therapy Referral; Future  - diazepam (VALIUM) 2 MG tablet; Take 1 tablet (2 mg) by mouth every 8 hours as needed (vertigo)  - polyethylene glycol (MIRALAX) 17 GM/Dose powder; Take 17 g (1 capful) by mouth 2 times daily      2. Chronic constipation  - polyethylene glycol (MIRALAX) 17 GM/Dose powder; Take 17 g (1 capful) by mouth 2 times daily.  We will try fiber increase and if not better as she has to splint her bottom will try colon screening and pelvic floor therapy.     Review of external notes as documented elsewhere in note  Ordering of each unique test  Prescription drug management  5 minutes spent on the date of the encounter doing patient visit, documentation and discussion with family        See Patient Instructions    No follow-ups on file.    RADHAMES Bowman  [facility] - MAHOGANY Garcia   Anya is a 71 year old who presents for the following health issues  accompanied by her alone.    HPI     ED/UC Followup:    Facility:  [facility] [place]  Date of visit: 3/3/22  Reason for visit: vertigo and UTI  Current Status: the same         Constipation  Onset/Duration: chronic.   Description:  Frequency of bowel movements: 2 to 3 days easy   Consistency of stool: firm   Progression of Symptoms: worsening  Accompanying signs and symptoms:    Abdominal pain: belly cramping.    Rectal pain: no   Blood in stool: no   Nausea/Vomiting: nausea only    Weight loss or gain: no  History:   Similar problems in past: YES  History of abdominal surgery: no  Chronic laxative use: no  New medications: has been taking stool softener, prunes, fiber, and no improvement   Precipitating or alleviating factors: laxative if she has to.    Therapies tried and outcome: Dulox. , fiber products prunes and dylan exercise. Manual removal of stool.     Review of Systems   CONSTITUTIONAL:NEGATIVE for fever, chills, change in weight  INTEGUMENTARY/SKIN: NEGATIVE for worrisome rashes, moles or lesions  EYES: NEGATIVE for vision changes or irritation  ENT/MOUTH: NEGATIVE for ear, mouth and throat problems  RESP:NEGATIVE for significant cough or SOB  CV: NEGATIVE for chest pain, palpitations or peripheral edema  GI: see hpi   MUSCULOSKELETAL: NEGATIVE for significant arthralgias or myalgia  NEURO: NEGATIVE for weakness, dizziness or paresthesias  ENDOCRINE: NEGATIVE for temperature intolerance, skin/hair changes  PSYCHIATRIC: NEGATIVE for changes in mood or affect      Objective    BP (!) 148/60   Pulse 94   Temp 97.1  F (36.2  C) (Tympanic)   Wt 70.3 kg (155 lb)   SpO2 98%   BMI 27.46 kg/m    Body mass index is 27.46 kg/m .  Physical Exam   GENERAL: healthy, alert and no distress  NECK: no adenopathy, no asymmetry, masses, or scars and thyroid normal to palpation  RESP: lungs clear to auscultation - no rales, rhonchi or wheezes  CV: regular rate and rhythm, normal S1 S2, no S3 or S4, no murmur, click or rub, no peripheral edema and peripheral pulses strong  ABDOMEN: soft, nontender, no hepatosplenomegaly, no masses and bowel sounds normal  MS: no gross musculoskeletal defects noted, no edema  SKIN: no suspicious lesions or rashes  NEURO: Normal strength and tone, mentation intact and speech normal    Admission on 03/03/2022, Discharged on 03/03/2022   Component Date Value Ref Range Status     Sodium 03/03/2022 143  133 - 144 mmol/L Final     Potassium 03/03/2022 3.7  3.4 - 5.3 mmol/L Final     Chloride 03/03/2022 112 (A) 94 - 109 mmol/L Final     Carbon Dioxide (CO2) 03/03/2022 27  20 - 32 mmol/L Final     Anion Gap 03/03/2022 4  3 - 14 mmol/L Final     Urea Nitrogen 03/03/2022 16  7 - 30 mg/dL Final     Creatinine 03/03/2022 1.01  0.52 - 1.04 mg/dL  Final     Calcium 03/03/2022 9.0  8.5 - 10.1 mg/dL Final     Glucose 03/03/2022 105 (A) 70 - 99 mg/dL Final     GFR Estimate 03/03/2022 58 (A) >60 mL/min/1.73m2 Final    Effective December 21, 2021 eGFRcr in adults is calculated using the 2021 CKD-EPI creatinine equation which includes age and gender (Ольга et al., [facility], DOI: 10.1056/RQYNso6061741)     Color Urine 03/03/2022 Yellow  Colorless, Straw, Light Yellow, Yellow Final     Appearance Urine 03/03/2022 Clear  Clear Final     Glucose Urine 03/03/2022 Negative  Negative mg/dL Final     Bilirubin Urine 03/03/2022 Negative  Negative Final     Ketones Urine 03/03/2022 Negative  Negative mg/dL Final     Specific Gravity Urine 03/03/2022 1.026  1.003 - 1.035 Final     Blood Urine 03/03/2022 Negative  Negative Final     pH Urine 03/03/2022 5.5  4.7 - 8.0 Final     Protein Albumin Urine 03/03/2022 10  (A) Negative mg/dL Final     Urobilinogen Urine 03/03/2022 Normal  Normal, 2.0 mg/dL Final     Nitrite Urine 03/03/2022 Negative  Negative Final     Leukocyte Esterase Urine 03/03/2022 Small (A) Negative Final     Mucus Urine 03/03/2022 Present (A) None Seen /LPF Final     RBC Urine 03/03/2022 <1  <=2 /HPF Final     WBC Urine 03/03/2022 3  <=5 /HPF Final     Squamous Epithelials Urine 03/03/2022 0  <=1 /HPF Final     WBC Count 03/03/2022 5.8  4.0 - 11.0 10e3/uL Final     RBC Count 03/03/2022 4.63  3.80 - 5.20 10e6/uL Final     Hemoglobin 03/03/2022 14.3  11.7 - 15.7 g/dL Final     Hematocrit 03/03/2022 45.1  35.0 - 47.0 % Final     MCV 03/03/2022 97  78 - 100 fL Final     MCH 03/03/2022 30.9  26.5 - 33.0 pg Final     MCHC 03/03/2022 31.7  31.5 - 36.5 g/dL Final     RDW 03/03/2022 13.0  10.0 - 15.0 % Final     Platelet Count 03/03/2022 357  150 - 450 10e3/uL Final     % Neutrophils 03/03/2022 58  % Final     % Lymphocytes 03/03/2022 31  % Final     % Monocytes 03/03/2022 8  % Final     % Eosinophils 03/03/2022 2  % Final     % Basophils 03/03/2022 1  % Final     % Immature  Granulocytes 03/03/2022 0  % Final     NRBCs per 100 WBC 03/03/2022 0  <1 /100 Final     Absolute Neutrophils 03/03/2022 3.4  1.6 - 8.3 10e3/uL Final     Absolute Lymphocytes 03/03/2022 1.8  0.8 - 5.3 10e3/uL Final     Absolute Monocytes 03/03/2022 0.5  0.0 - 1.3 10e3/uL Final     Absolute Eosinophils 03/03/2022 0.1  0.0 - 0.7 10e3/uL Final     Absolute Basophils 03/03/2022 0.1  0.0 - 0.2 10e3/uL Final     Absolute Immature Granulocytes 03/03/2022 0.0  <=0.4 10e3/uL Final     Absolute NRBCs 03/03/2022 0.0  10e3/uL Final

## 2022-03-10 ENCOUNTER — OFFICE VISIT (OUTPATIENT)
Dept: FAMILY MEDICINE | Facility: OTHER | Age: 72
End: 2022-03-10
Attending: PHYSICIAN ASSISTANT
Payer: COMMERCIAL

## 2022-03-10 VITALS
DIASTOLIC BLOOD PRESSURE: 60 MMHG | OXYGEN SATURATION: 98 % | SYSTOLIC BLOOD PRESSURE: 148 MMHG | TEMPERATURE: 97.1 F | HEART RATE: 94 BPM | BODY MASS INDEX: 27.46 KG/M2 | WEIGHT: 155 LBS

## 2022-03-10 DIAGNOSIS — H81.13 BENIGN PAROXYSMAL POSITIONAL VERTIGO DUE TO BILATERAL VESTIBULAR DISORDER: Primary | ICD-10-CM

## 2022-03-10 DIAGNOSIS — F33.1 MODERATE EPISODE OF RECURRENT MAJOR DEPRESSIVE DISORDER (H): ICD-10-CM

## 2022-03-10 DIAGNOSIS — J45.20 MILD INTERMITTENT ASTHMA WITHOUT COMPLICATION: ICD-10-CM

## 2022-03-10 DIAGNOSIS — K59.09 CHRONIC CONSTIPATION: ICD-10-CM

## 2022-03-10 PROBLEM — J45.909 ASTHMA: Status: ACTIVE | Noted: 2022-03-10

## 2022-03-10 PROCEDURE — 99214 OFFICE O/P EST MOD 30 MIN: CPT | Performed by: PHYSICIAN ASSISTANT

## 2022-03-10 PROCEDURE — G0463 HOSPITAL OUTPT CLINIC VISIT: HCPCS

## 2022-03-10 PROCEDURE — G0463 HOSPITAL OUTPT CLINIC VISIT: HCPCS | Mod: 25

## 2022-03-10 RX ORDER — DIAZEPAM 2 MG
2 TABLET ORAL EVERY 8 HOURS PRN
Qty: 30 TABLET | Refills: 1 | Status: SHIPPED | OUTPATIENT
Start: 2022-03-10 | End: 2022-04-15

## 2022-03-10 RX ORDER — POLYETHYLENE GLYCOL 3350 17 G/17G
1 POWDER, FOR SOLUTION ORAL 2 TIMES DAILY
Qty: 850 G | Refills: 3 | Status: SHIPPED | OUTPATIENT
Start: 2022-03-10

## 2022-03-10 RX ORDER — ANTIOX #8/OM3/DHA/EPA/LUT/ZEAX 250-2.5 MG
1 CAPSULE ORAL DAILY
COMMUNITY

## 2022-03-10 ASSESSMENT — PAIN SCALES - GENERAL: PAINLEVEL: NO PAIN (0)

## 2022-03-10 NOTE — NURSING NOTE
"No chief complaint on file.      Initial BP (!) 148/60   Pulse 94   Temp 97.1  F (36.2  C) (Tympanic)   Wt 70.3 kg (155 lb)   SpO2 98%   BMI 27.46 kg/m   Estimated body mass index is 27.46 kg/m  as calculated from the following:    Height as of 3/3/22: 1.6 m (5' 3\").    Weight as of this encounter: 70.3 kg (155 lb).  Medication Reconciliation: complete  Florencio Ochoa LPN    "

## 2022-03-12 ENCOUNTER — MYC MEDICAL ADVICE (OUTPATIENT)
Dept: FAMILY MEDICINE | Facility: OTHER | Age: 72
End: 2022-03-12
Payer: COMMERCIAL

## 2022-03-12 DIAGNOSIS — H81.13 BENIGN PAROXYSMAL POSITIONAL VERTIGO DUE TO BILATERAL VESTIBULAR DISORDER: ICD-10-CM

## 2022-03-12 DIAGNOSIS — J98.01 BRONCHOSPASM: Primary | ICD-10-CM

## 2022-03-13 DIAGNOSIS — J45.901 MILD ASTHMA WITH ACUTE EXACERBATION, UNSPECIFIED WHETHER PERSISTENT: Primary | ICD-10-CM

## 2022-03-13 DIAGNOSIS — R06.09 DOE (DYSPNEA ON EXERTION): ICD-10-CM

## 2022-03-17 ENCOUNTER — HOSPITAL ENCOUNTER (OUTPATIENT)
Dept: PHYSICAL THERAPY | Facility: HOSPITAL | Age: 72
Setting detail: THERAPIES SERIES
Discharge: HOME | End: 2022-03-17
Attending: PHYSICIAN ASSISTANT
Payer: MEDICARE

## 2022-03-17 DIAGNOSIS — R42 DIZZINESS: Primary | ICD-10-CM

## 2022-03-17 DIAGNOSIS — R42 DISEQUILIBRIUM: ICD-10-CM

## 2022-03-17 DIAGNOSIS — H81.13 BENIGN PAROXYSMAL POSITIONAL VERTIGO DUE TO BILATERAL VESTIBULAR DISORDER: ICD-10-CM

## 2022-03-17 PROCEDURE — 97162 PT EVAL MOD COMPLEX 30 MIN: CPT | Mod: GP | Performed by: PHYSICAL THERAPIST

## 2022-03-17 PROCEDURE — 999N000214 HC STATISTIC PT OUTPT VISIT: Performed by: PHYSICAL THERAPIST

## 2022-03-17 PROCEDURE — 97112 NEUROMUSCULAR REEDUCATION: CPT | Mod: GP | Performed by: PHYSICAL THERAPIST

## 2022-03-18 NOTE — PROGRESS NOTES
"   03/17/22 1250   Quick Adds   Quick Adds Certification;Vestibular Eval   Type of Visit Initial OP PT Evaluation   General Information   Start of Care Date 03/17/22   Referring Physician RADHAMES Alvarado   Orders Evaluate and Treat as Indicated   Order Date 03/17/22   Medical Diagnosis dizziness, disequilibrium   Onset of illness/injury or Date of Surgery 03/10/22  (Date of initial PT orders)   Surgical/Medical history reviewed Yes   Pertinent history of current vestibular problem (include personal factors and/or comorbidities that impact the POC)  ADHD;Anxiety;Depression;Prior concussion(s)   Pertinent history of current problem (include personal factors and/or comorbidities that impact the POC) Pt states 2/27/22 she went to [facility] and had a terrible headache.  Pt states that through the week they continued even with the use of tylenol.  Pt states her eyes were swollen as well during that time.  Pt states she was having double vision up<>down.  Pt went to the ED, had CT scan that was negative.  Pt was prescribed meclizine and states it made her sick so didn't take it and also had an antibiotic due to signs of a small UTI.  Pt states she went to the eye doctor the next day, was told she had severely dry eyes and was given drops.  Pt states she started using ice packs for the swelling in her eyes which has helped.  Pt states for the next week she continued to walk around like she was intoxicated, had many near falls, states she felt like she was intoxicated.  Pt states she feels dizzy all the time, states initially if she looked in the mirror her head would be moving and when doing her eye exam the letters she was told to focus on were moving/floating.  Pt describes dizziness as \"off balance\" and states she feels like water is sloshing around in her head all the time.  Pt states she has had to sit down and slide down her steps because she doesn't feel safe walking.  Pt saw her primary care provider on 3/10/22 as a " follow up.   Pt was issued valium and has been taking that 1-2x/day and states that it has been helping some with her symptoms.  Pt reports symptoms have decreased some in the past week.  Pt states she can tell when the valium is wearing off.  Pt denies getting increased dizzines with sit>sup or rolling in bed.  Pt states if she sits up quickly she can get some dizziness.  Pt states dizziness is present even when sitting still.  Pt states she can still not tolerating reading.  Pt states she continues to have daily headaches.  Pt denies any recent illness prior to onset of symptoms.  Pt reports the dizziness is constant and always there, made worse with any types of movement.  Pt also reports she has been having episodes of dropping things which is not normal for her.   Pertinent Visual History  h/o cataracts   Prior level of function comment independent   Diagnostic Tests CT Scan   CT Results unremarkable   Previous/Current Treatment Medication(s)   Improvement after medication Mild   Patient role/Employment history Retired   Living environment House/townhome   Home/Community Accessibility Comments stairs within home   Patient/Family Goals Statement get rid of dizziness and improve balance   General Information Comments Pt reports she last took valium yesterday afternoon   Fall Risk Screen   Fall screen completed by PT   Have you fallen 2 or more times in the past year? No   Have you fallen and had an injury in the past year? No   Is patient a fall risk? No   Abuse Screen (yes response referral indicated)   Feels Unsafe at Home or Work/School no   Feels Threatened by Someone no   Does Anyone Try to Keep You From Having Contact with Others or Doing Things Outside Your Home? no   Physical Signs of Abuse Present no   System Outcome Measures   Outcome Measures BPPV   Dizziness Handicap Inventory (score out of 100) A decrease in score by 17.18 or greater indicates a clinically significant change in symptoms. 84    Cognitive Status Examination   Orientation orientation to person, place and time   Level of Consciousness alert   Follows Commands and Answers Questions 100% of the time   Personal Safety and Judgment intact   Memory intact   Range of Motion (ROM)   ROM Comment Bilateral LE AROM WFL   Strength   Strength Comments bilateral LE WFL   Bed Mobility   Bed Mobility Comments independent   Transfer Skills   Transfer Comments independent but slow to transition and guarded   Gait   Gait Comments demonstrates slow, ataxic gait, moves en bloc, guarded gait - prefers to reach for walls/furniture   Gait Special Tests   Gait Special Tests FUNCTIONAL GAIT ASSESSMENT   Gait Special Tests Functional Gait Assessment Score out of 30   Score out of 30 13   Balance   Balance Comments impaired, see testing   Balance Special Tests   Balance Special Tests Modified CTSIB Conditions   Balance Special Tests Modified CTSIB Conditions   Condition 1, seconds 30 Seconds   Condition 2, seconds 30 Seconds  (moderate sway)   Condition 4, seconds 30 Seconds   Condition 5, seconds 30 Seconds  (first attempt 4 sec, second attempt 5 sec, final attempt 30)   Modified CTSIB Comments significant instability in EC conditions   Cervicogenic Screen   Neck ROM full   Oculomotor Exam   Smooth Pursuit Abnormal   Smooth Pursuit Comment saccadic intrusions   Saccades Normal   Saccades Comments but slow   VOR Normal   VOR Cancellation Normal   Rapid Head Thrust Corrective Saccade L head thrust   Infrared Goggle Exam or Frenzel Lense Exam   Vestibular Suppressant in Last 24 Hours? Yes   Exam completed with Infrared Goggles   Spontaneous Nystagmus Horizontal R   Gaze Evoked Nystagmus Other   Gaze Evoked Nystagmus comments Right beat in forward and right gaze with fixation removed, left beat in left gaze with fixation removed   Head Shake Horizontal Nystagmus Horizontal R   Head Shake Horizontal Nystagmus comments x13 beats   [place]-Hallpike (right) Horizontal R    [place]-Hallpike (right) comments no change in symptoms, non-fatiguing   [place]-Hallpike (Left) Horizontal R   [place]-Hallpike (left) comments no change in symptoms, non-fatiguing   HSCC Supine Roll Test (Right) Horizontal R   HSCC Supine Roll Test (Right) Comments no change in symptoms, non-fatiguing   HSCC Supine Roll Test (Left) Horizontal R   HSCC Supine Roll Test (Left) Comments  no change in symptoms, non-fatiguing   Dynamic Visual Acuity (DVA)   Static Acuity (LogMar) line 6   Horizontal Head Movement at 2 Hz (LogMar) line 1   Planned Therapy Interventions   Planned Therapy Interventions balance training;neuromuscular re-education;strengthening;visual perception   Clinical Impression   Criteria for Skilled Therapeutic Interventions Met yes, treatment indicated   PT Diagnosis gait disturbance and dizziness   Influenced by the following impairments dizziness, impaired balance, pain   Functional limitations due to impairments decreased safety with functional mobility and ADLs   Clinical Presentation Evolving/Changing   Clinical Presentation Rationale clinical judgement   Clinical Decision Making (Complexity) Moderate complexity   Therapy Frequency 2 times/Week   Predicted Duration of Therapy Intervention (days/wks) 90 days   Risk & Benefits of therapy have been explained Yes   Patient, Family & other staff in agreement with plan of care Yes   Clinical Impression Comments PT evaluation completed for dizziness and imbalance.  Pt reports sudden onset of severe headache about 2.5 weeks ago that developed into impaired balance and dizziness.  Pt reports ongoing double vision, dropping of objects, impaired balance, and dizziness.  Pt negative for BPPV.  Pt demonstrates impaired smooth pursuit and gaze evoked direction changing nystagmus concerning for central involvement.  Pt does demonstrate a positive head thrust test, positive head shake, and impaired DVA which could also indicate an unilateral vestibular hypofunction.   Vestibular rehab indicate to treat gaze instability and balance deficits, but would also benefit from further medical work up.     Education Assessment   Preferred Learning Style Listening   Barriers to Learning No barriers   GOALS   PT Eval Goals 1;2;3   Goal 1   Goal Identifier STG 1   Goal Description Pt to be independent and compliant with HEP.   Target Date 03/31/22   Goal 2   Goal Identifier LTG 1   Goal Description Pt to demonstrates improved DVA to 2 line difference or less.   Target Date 06/15/22   Goal 3   Goal Identifier LTG 2   Goal Description Pt to report at least 75% improvement in symptoms to tolerate daily activities without assistance.   Target Date 06/15/22   Total Evaluation Time   PT Eval, Moderate Complexity Minutes (43003) 49   Therapy Certification   Certification date from 03/17/22   Certification date to 06/15/22   Medical Diagnosis dizziness, disequilibrium   Certification I certify the need for these services furnished under this plan of treatment and while under my care.  (Physician co-signature of this document indicates review and certification of the therapy plan).

## 2022-03-29 ENCOUNTER — HOSPITAL ENCOUNTER (OUTPATIENT)
Dept: PHYSICAL THERAPY | Facility: HOSPITAL | Age: 72
Setting detail: THERAPIES SERIES
Discharge: HOME | End: 2022-03-29
Attending: PHYSICIAN ASSISTANT
Payer: MEDICARE

## 2022-03-29 ENCOUNTER — HOSPITAL ENCOUNTER (OUTPATIENT)
Dept: MRI IMAGING | Facility: HOSPITAL | Age: 72
Discharge: HOME | End: 2022-03-29
Attending: PHYSICIAN ASSISTANT | Admitting: PHYSICIAN ASSISTANT
Payer: MEDICARE

## 2022-03-29 DIAGNOSIS — H81.13 BENIGN PAROXYSMAL POSITIONAL VERTIGO DUE TO BILATERAL VESTIBULAR DISORDER: ICD-10-CM

## 2022-03-29 PROCEDURE — A9585 GADOBUTROL INJECTION: HCPCS | Performed by: RADIOLOGY

## 2022-03-29 PROCEDURE — 70553 MRI BRAIN STEM W/O & W/DYE: CPT

## 2022-03-29 PROCEDURE — 97112 NEUROMUSCULAR REEDUCATION: CPT | Mod: GP | Performed by: PHYSICAL THERAPIST

## 2022-03-29 PROCEDURE — 255N000002 HC RX 255 OP 636: Performed by: RADIOLOGY

## 2022-03-29 RX ORDER — GADOBUTROL 604.72 MG/ML
7.5 INJECTION INTRAVENOUS ONCE
Status: COMPLETED | OUTPATIENT
Start: 2022-03-29 | End: 2022-03-29

## 2022-03-29 RX ADMIN — GADOBUTROL 7.5 ML: 604.72 INJECTION INTRAVENOUS at 15:35

## 2022-04-02 ENCOUNTER — MYC MEDICAL ADVICE (OUTPATIENT)
Dept: FAMILY MEDICINE | Facility: OTHER | Age: 72
End: 2022-04-02
Payer: COMMERCIAL

## 2022-04-02 DIAGNOSIS — G31.9: ICD-10-CM

## 2022-04-02 DIAGNOSIS — R42 VERTIGO: Primary | ICD-10-CM

## 2022-04-04 ENCOUNTER — HOSPITAL ENCOUNTER (OUTPATIENT)
Dept: RESPIRATORY THERAPY | Facility: HOSPITAL | Age: 72
Discharge: HOME | End: 2022-04-04
Attending: PHYSICIAN ASSISTANT | Admitting: INTERNAL MEDICINE
Payer: MEDICARE

## 2022-04-04 DIAGNOSIS — J45.901 MILD ASTHMA WITH ACUTE EXACERBATION, UNSPECIFIED WHETHER PERSISTENT: ICD-10-CM

## 2022-04-04 DIAGNOSIS — R06.09 DOE (DYSPNEA ON EXERTION): ICD-10-CM

## 2022-04-04 PROCEDURE — 94726 PLETHYSMOGRAPHY LUNG VOLUMES: CPT

## 2022-04-04 PROCEDURE — 250N000009 HC RX 250: Performed by: PHYSICIAN ASSISTANT

## 2022-04-04 PROCEDURE — 94729 DIFFUSING CAPACITY: CPT

## 2022-04-04 PROCEDURE — 94060 EVALUATION OF WHEEZING: CPT

## 2022-04-04 RX ORDER — ALBUTEROL SULFATE 0.83 MG/ML
2.5 SOLUTION RESPIRATORY (INHALATION)
Status: COMPLETED | OUTPATIENT
Start: 2022-04-04 | End: 2022-04-04

## 2022-04-04 RX ADMIN — ALBUTEROL SULFATE 2.5 MG: 2.5 SOLUTION RESPIRATORY (INHALATION) at 08:15

## 2022-04-05 ENCOUNTER — HOSPITAL ENCOUNTER (OUTPATIENT)
Dept: PHYSICAL THERAPY | Facility: HOSPITAL | Age: 72
Setting detail: THERAPIES SERIES
Discharge: HOME | End: 2022-04-05
Attending: PHYSICIAN ASSISTANT
Payer: MEDICARE

## 2022-04-05 PROCEDURE — 97112 NEUROMUSCULAR REEDUCATION: CPT | Mod: GP | Performed by: PHYSICAL THERAPIST

## 2022-04-08 ENCOUNTER — HOSPITAL ENCOUNTER (OUTPATIENT)
Dept: PHYSICAL THERAPY | Facility: HOSPITAL | Age: 72
Setting detail: THERAPIES SERIES
Discharge: HOME | End: 2022-04-08
Attending: PHYSICIAN ASSISTANT
Payer: MEDICARE

## 2022-04-08 PROCEDURE — 97112 NEUROMUSCULAR REEDUCATION: CPT | Mod: GP | Performed by: PHYSICAL THERAPIST

## 2022-04-14 ENCOUNTER — TELEPHONE (OUTPATIENT)
Dept: GENERAL RADIOLOGY | Facility: HOSPITAL | Age: 72
End: 2022-04-14
Payer: COMMERCIAL

## 2022-04-14 ENCOUNTER — ANCILLARY PROCEDURE (OUTPATIENT)
Dept: MAMMOGRAPHY | Facility: OTHER | Age: 72
End: 2022-04-14
Attending: PHYSICIAN ASSISTANT
Payer: MEDICARE

## 2022-04-14 DIAGNOSIS — Z12.31 VISIT FOR SCREENING MAMMOGRAM: ICD-10-CM

## 2022-04-14 PROCEDURE — 77067 SCR MAMMO BI INCL CAD: CPT | Mod: TC

## 2022-04-14 NOTE — PROGRESS NOTES
"  Assessment & Plan     Nocturia  Up many times a night could contribute to her fatigue.  We will be having her get on oxybiotin again.   - oxybutynin ER (DITROPAN-XL) 5 MG 24 hr tablet; Take 1 tablet (5 mg) by mouth daily    Primary narcolepsy without cataplexy  Needing eval for Cpap tried her husbands and helped.   - Adult Sleep Eval & Management  Referral; Future    Fatigue, unspecified type  All the time, recent MRI brain also show some advanced brain volume loss for  Her age.   - Adult Sleep Eval & Management  Referral; Future    Snoring  She is going to be given a sleep referral. Wants with us gets her Adderall from [facility] but wants to move things all in one spot.   - Adult Sleep Eval & Management  Referral; Future    Review of external notes as documented elsewhere in note  Ordering of each unique test  Prescription drug management  5 minutes spent on the date of the encounter doing chart review        BMI:   Estimated body mass index is 27.46 kg/m  as calculated from the following:    Height as of this encounter: 1.6 m (5' 3\").    Weight as of this encounter: 70.3 kg (155 lb).   Weight management plan: Discussed healthy diet and exercise guidelines    See Patient Instructions    No follow-ups on file.    RADHAMES Bowman  [facility] - MAHOGANY Joyner is a 71 year old who presents for the following health issues     HPI     Dizziness  Onset/Duration: follow up from 3/10/2022  Description:   Do you feel faint: no  Does it feel like the surroundings (bed, room) are moving: no  Unsteady/off balance: no  Have you passed out or fallen: no  Intensity: mild  Progression of Symptoms: improving  Accompanying Signs & Symptoms:  Heart palpitations or chest pain: no  Nausea, vomiting: no  Weakness or lack of coordination in arms or legs: no  Vision or speech changes: no  Numbness or tingling: no  Ringing in ears (Tinnitus): no  Hearing Loss: no  History: " "  Head trauma/concussion history: no  Previous similar symptoms: no  Recent bleeding history: no  Any new medications (BP?): no  Precipitating factors:   Worse with activity: no  Worse with head movement: no  Alleviating factors:   Does staying in a fixed position give relief: no   Therapies tried and outcome: currently going to PT which is effective, also has referral to Neurologist that still needs to be scheduled.         Review of Systems   Constitutional, HEENT, cardiovascular, pulmonary, gi and gu systems are negative, except as otherwise noted.      Objective    /84 (BP Location: Right arm, Patient Position: Sitting, Cuff Size: Adult Regular)   Pulse 87   Temp 97.2  F (36.2  C) (Tympanic)   Ht 1.6 m (5' 3\")   Wt 70.3 kg (155 lb)   SpO2 98%   BMI 27.46 kg/m    Body mass index is 27.46 kg/m .  Physical Exam   GENERAL: healthy, alert and no distress  EYES: Eyes grossly normal to inspection, PERRL and conjunctivae and sclerae normal  HENT: ear canals and TM's normal, nose and mouth without ulcers or lesions  NECK: no adenopathy, no asymmetry, masses, or scars and thyroid normal to palpation  RESP: lungs clear to auscultation - no rales, rhonchi or wheezes  CV: regular rate and rhythm, normal S1 S2, no S3 or S4, no murmur, click or rub, no peripheral edema and peripheral pulses strong  ABDOMEN: soft, nontender, no hepatosplenomegaly, no masses and bowel sounds normal  MS: no gross musculoskeletal defects noted, no edema  NEURO: Normal strength and tone, sensory exam grossly normal and review all her testing. Poor recall worse when she is very tied. Feeling tired all the time.   PSYCH: mentation appears normal, affect normal/bright  LYMPH: no cervical, supraclavicular, axillary, or inguinal adenopathy    Admission on 03/03/2022, Discharged on 03/03/2022   Component Date Value Ref Range Status     Sodium 03/03/2022 143  133 - 144 mmol/L Final     Potassium 03/03/2022 3.7  3.4 - 5.3 mmol/L Final     " Chloride 03/03/2022 112 (A) 94 - 109 mmol/L Final     Carbon Dioxide (CO2) 03/03/2022 27  20 - 32 mmol/L Final     Anion Gap 03/03/2022 4  3 - 14 mmol/L Final     Urea Nitrogen 03/03/2022 16  7 - 30 mg/dL Final     Creatinine 03/03/2022 1.01  0.52 - 1.04 mg/dL Final     Calcium 03/03/2022 9.0  8.5 - 10.1 mg/dL Final     Glucose 03/03/2022 105 (A) 70 - 99 mg/dL Final     GFR Estimate 03/03/2022 58 (A) >60 mL/min/1.73m2 Final    Effective December 21, 2021 eGFRcr in adults is calculated using the 2021 CKD-EPI creatinine equation which includes age and gender (Ольга et al., [place], DOI: 10.1056/[id number])     Color Urine 03/03/2022 Yellow  Colorless, Straw, Light Yellow, Yellow Final     Appearance Urine 03/03/2022 Clear  Clear Final     Glucose Urine 03/03/2022 Negative  Negative mg/dL Final     Bilirubin Urine 03/03/2022 Negative  Negative Final     Ketones Urine 03/03/2022 Negative  Negative mg/dL Final     Specific Gravity Urine 03/03/2022 1.026  1.003 - 1.035 Final     Blood Urine 03/03/2022 Negative  Negative Final     pH Urine 03/03/2022 5.5  4.7 - 8.0 Final     Protein Albumin Urine 03/03/2022 10  (A) Negative mg/dL Final     Urobilinogen Urine 03/03/2022 Normal  Normal, 2.0 mg/dL Final     Nitrite Urine 03/03/2022 Negative  Negative Final     Leukocyte Esterase Urine 03/03/2022 Small (A) Negative Final     Mucus Urine 03/03/2022 Present (A) None Seen /LPF Final     RBC Urine 03/03/2022 <1  <=2 /HPF Final     WBC Urine 03/03/2022 3  <=5 /HPF Final     Squamous Epithelials Urine 03/03/2022 0  <=1 /HPF Final     WBC Count 03/03/2022 5.8  4.0 - 11.0 10e3/uL Final     RBC Count 03/03/2022 4.63  3.80 - 5.20 10e6/uL Final     Hemoglobin 03/03/2022 14.3  11.7 - 15.7 g/dL Final     Hematocrit 03/03/2022 45.1  35.0 - 47.0 % Final     MCV 03/03/2022 97  78 - 100 fL Final     MCH 03/03/2022 30.9  26.5 - 33.0 pg Final     MCHC 03/03/2022 31.7  31.5 - 36.5 g/dL Final     RDW 03/03/2022 13.0  10.0 - 15.0 % Final      Platelet Count 03/03/2022 357  150 - 450 10e3/uL Final     % Neutrophils 03/03/2022 58  % Final     % Lymphocytes 03/03/2022 31  % Final     % Monocytes 03/03/2022 8  % Final     % Eosinophils 03/03/2022 2  % Final     % Basophils 03/03/2022 1  % Final     % Immature Granulocytes 03/03/2022 0  % Final     NRBCs per 100 WBC 03/03/2022 0  <1 /100 Final     Absolute Neutrophils 03/03/2022 3.4  1.6 - 8.3 10e3/uL Final     Absolute Lymphocytes 03/03/2022 1.8  0.8 - 5.3 10e3/uL Final     Absolute Monocytes 03/03/2022 0.5  0.0 - 1.3 10e3/uL Final     Absolute Eosinophils 03/03/2022 0.1  0.0 - 0.7 10e3/uL Final     Absolute Basophils 03/03/2022 0.1  0.0 - 0.2 10e3/uL Final     Absolute Immature Granulocytes 03/03/2022 0.0  <=0.4 10e3/uL Final     Absolute NRBCs 03/03/2022 0.0  10e3/uL Final     3/29/22  3:50 PM [id number] HI MRI          PACS Images     Show images for MR Brain w/o & w Contrast         Study Result    Narrative & Impression   EXAM:  MR BRAIN W/O & W CONTRAST     HISTORY:  Dizziness, non-specific; has been in Vestibular rehab not  getting better.; Benign paroxysmal positional vertigo due to bilateral  vestibular disorder. .     TECHNIQUE:  Sagittal T1, axial T1, T2, FLAIR, diffusion, gradient as  well as axial and 3D postcontrast imaging of the whole brain was  performed.     MEDS/CONTRAST: Gadavist 7.5 mL     COMPARISON:  3/3/2022      FINDINGS:    Prominence of the ventricles and sulci is compatible with mild,  generalized volume loss. No abnormal extra-axial collection,  hydrocephalus, mass effect or midline shift is seen. The basal  cisterns are preserved.     Moderate to advanced supratentorial and pontine chronic microvascular  ischemic changes are present. No abnormal intracranial enhancement or  concerning T2* gradient susceptibility is identified. No IAC or CP  angle mass is identified on these nondedicated images No restricted  diffusion is present. The major intracranial vascular flow voids  are  preserved.     The T1 marrow signal is unremarkable.                                                                       IMPRESSION: Moderate to advanced chronic microvascular ischemic  changes of the supratentorial white matter and kailash.     JESS HOLLOWAY MD         SYSTEM ID:  [id number]

## 2022-04-15 ENCOUNTER — OFFICE VISIT (OUTPATIENT)
Dept: FAMILY MEDICINE | Facility: OTHER | Age: 72
End: 2022-04-15
Attending: PHYSICIAN ASSISTANT
Payer: COMMERCIAL

## 2022-04-15 VITALS
OXYGEN SATURATION: 98 % | BODY MASS INDEX: 27.46 KG/M2 | SYSTOLIC BLOOD PRESSURE: 130 MMHG | HEIGHT: 63 IN | DIASTOLIC BLOOD PRESSURE: 84 MMHG | WEIGHT: 155 LBS | TEMPERATURE: 97.2 F | HEART RATE: 87 BPM

## 2022-04-15 DIAGNOSIS — G47.419 PRIMARY NARCOLEPSY WITHOUT CATAPLEXY: ICD-10-CM

## 2022-04-15 DIAGNOSIS — R06.83 SNORING: ICD-10-CM

## 2022-04-15 DIAGNOSIS — R35.1 NOCTURIA: Primary | ICD-10-CM

## 2022-04-15 DIAGNOSIS — R53.83 FATIGUE, UNSPECIFIED TYPE: ICD-10-CM

## 2022-04-15 PROCEDURE — G0463 HOSPITAL OUTPT CLINIC VISIT: HCPCS

## 2022-04-15 PROCEDURE — 99214 OFFICE O/P EST MOD 30 MIN: CPT | Performed by: PHYSICIAN ASSISTANT

## 2022-04-15 RX ORDER — OXYBUTYNIN CHLORIDE 5 MG/1
5 TABLET, EXTENDED RELEASE ORAL DAILY
Qty: 30 TABLET | Refills: 3 | Status: SHIPPED | OUTPATIENT
Start: 2022-04-15 | End: 2022-10-25

## 2022-04-15 ASSESSMENT — PATIENT HEALTH QUESTIONNAIRE - PHQ9: SUM OF ALL RESPONSES TO PHQ QUESTIONS 1-9: 0

## 2022-04-15 ASSESSMENT — ANXIETY QUESTIONNAIRES
6. BECOMING EASILY ANNOYED OR IRRITABLE: NOT AT ALL
4. TROUBLE RELAXING: NOT AT ALL
5. BEING SO RESTLESS THAT IT IS HARD TO SIT STILL: NOT AT ALL
3. WORRYING TOO MUCH ABOUT DIFFERENT THINGS: NOT AT ALL
GAD7 TOTAL SCORE: 0
2. NOT BEING ABLE TO STOP OR CONTROL WORRYING: NOT AT ALL
1. FEELING NERVOUS, ANXIOUS, OR ON EDGE: NOT AT ALL
7. FEELING AFRAID AS IF SOMETHING AWFUL MIGHT HAPPEN: NOT AT ALL

## 2022-04-15 ASSESSMENT — PAIN SCALES - GENERAL: PAINLEVEL: NO PAIN (0)

## 2022-04-15 NOTE — NURSING NOTE
"Chief Complaint   Patient presents with     Results     Dizziness follow up       Initial /84 (BP Location: Right arm, Patient Position: Sitting, Cuff Size: Adult Regular)   Pulse 87   Temp 97.2  F (36.2  C) (Tympanic)   Ht 1.6 m (5' 3\")   Wt 70.3 kg (155 lb)   SpO2 98%   BMI 27.46 kg/m   Estimated body mass index is 27.46 kg/m  as calculated from the following:    Height as of this encounter: 1.6 m (5' 3\").    Weight as of this encounter: 70.3 kg (155 lb).  Medication Reconciliation: complete  Jenifer Ramos LPN  "

## 2022-04-16 ASSESSMENT — ANXIETY QUESTIONNAIRES: GAD7 TOTAL SCORE: 0

## 2022-04-22 ENCOUNTER — HOSPITAL ENCOUNTER (OUTPATIENT)
Dept: PHYSICAL THERAPY | Facility: HOSPITAL | Age: 72
Setting detail: THERAPIES SERIES
Discharge: HOME | End: 2022-04-22
Attending: PHYSICIAN ASSISTANT
Payer: MEDICARE

## 2022-04-22 PROCEDURE — 97112 NEUROMUSCULAR REEDUCATION: CPT | Mod: GP | Performed by: PHYSICAL THERAPIST

## 2022-05-10 ENCOUNTER — DOCUMENTATION ONLY (OUTPATIENT)
Dept: SLEEP MEDICINE | Facility: HOSPITAL | Age: 72
End: 2022-05-10
Payer: COMMERCIAL

## 2022-05-11 NOTE — PROGRESS NOTES
[facility]       Name: Anya Vicente MRN# [id number]   Age: 72 year old YOB: 1950     Stop Bang questionnaire completed with a score of >3 to allow for HST     Have you been told you snore loudly (louder than talking or loud enough to be heard through doors)? YES    Do you often feel tired, fatigued, or sleepy during the daytime? YES    Has anyone observed you stop breathing during your sleep? NO    Do you have or are you being treated for high blood pressure? NO    Is your BMI greater than 35? NO    Is your neck size circumference 16 inches or greater? NO    Are you over 50 years old? YES    Stop Bang Score (# of yes): 3

## 2022-05-11 NOTE — PROGRESS NOTES
Chart review prior to sleep testing.    Patient Summary:  72 year old yo female who is referred for snoring.    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     Priority: Medium     Other secondary scoliosis, thoracolumbar region 06/24/2021     Priority: Medium     Mixed stress and urge urinary incontinence 12/26/2018     Priority: Medium     Leg cramps 12/26/2018     Priority: Medium     Vitamin D deficiency disease 12/26/2018     Priority: Medium     Major depressive disorder 01/16/2015     Priority: Medium     Insomnia 08/27/2014     Priority: Medium     Glucose intolerance (impaired glucose tolerance) 11/14/2013     Priority: Medium     Narcolepsy without 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) 07/31/2013     Priority: Medium     Complete rupture of rotator cuff 03/09/2009     Priority: Medium     Glucose intolerance 10/16/2008     Priority: Medium     Overview:   IMO Update 10/11       Esophageal reflux 03/26/2008     Priority: Medium     Pain in joint, ankle and foot 08/21/2007     Priority: Medium     Overview:   IMO Update 10/11       Open bimalleolar fracture 08/16/2007     Priority: Medium     Myalgia and myositis 05/01/2007     Priority: Medium     Overview:   IMO Update 10/11       Other malaise and fatigue 05/01/2007     Priority: Medium     Fibromyalgia 09/09/1984     Priority: Medium       Current Outpatient Medications   Medication     albuterol (PROAIR HFA/PROVENTIL HFA/VENTOLIN HFA) 108 (90 Base) MCG/ACT inhaler     amphetamine-dextroamphetamine (ADDERALL) 20 MG tablet     Calcium Carbonate-Vit D-Min (CALCIUM 1200 PO)     cholecalciferol (VITAMIN D3) 1250 mcg (69181 units)  "capsule     cyclobenzaprine (FLEXERIL) 5 MG tablet     docusate sodium (COLACE) 100 MG capsule     hydrochlorothiazide (HYDRODIURIL) 50 MG tablet     ipratropium - albuterol 0.5 mg/2.5 mg/3 mL (DUONEB) 0.5-2.5 (3) MG/3ML neb solution     Magnesium Oxide 500 MG CAPS     Multiple Vitamins-Minerals (PRESERVISION AREDS 2) CAPS     ORDER FOR DME     oxybutynin ER (DITROPAN-XL) 5 MG 24 hr tablet     polyethylene glycol (MIRALAX) 17 GM/Dose powder     pramipexole (MIRAPEX) 0.5 MG tablet     predniSONE (DELTASONE) 20 MG tablet     traMADol (ULTRAM) 50 MG tablet     zolpidem (AMBIEN) 5 MG tablet     Current Facility-Administered Medications   Medication     albuterol (PROAIR HFA/PROVENTIL HFA/VENTOLIN HFA) 108 (90 Base) MCG/ACT inhaler 2 puff     Pertinent PMHx of RLS, narcolepsy, insomnia, ADHD, PTSD, tourette's disorder.     I did review prior visits with DINO Freedman on 12/6/2016.  Here noted for follow up of narcolepsy without cataplexy, note of minimal response to modafinil and plan to continue adderall 40mg AM and 20mg PM.    Reviewed scanned PSG results:    9/20/2012 - PSG with weight 148 lbs, BMI 26.2  Sleep latency 8 minutes, REM sleep latency 252 minutes. AHI 1.3.  No hypoxemia.    9/21/2012 - MSLT with mean sleep latency of 6.9 minutes with no SOREM's on 5 naps.    No documentation of actigraphy    STOP-BANG score of 3, with unknown neck circumference.  [place] score of 8-13.  BMI of Estimated body mass index is 27.46 kg/m  as calculated from the following:    Height as of 4/15/22: 1.6 m (5' 3\").    Weight as of 4/15/22: 70.3 kg (155 lb).     Per questionnaire: \" says I have increased snoring, increased tiredness, up 2-3 times a night to bathroom. He says I wake up talking in sleep or chattering like a chipmunk. I alternate between light sleep and dark sleep.\"    Caffeine use:  No for 3+ per day.  No for within 6 hours of bed.    Tobacco use: No    Sleep pattern:  Sleep pattern questions not " completed.  Time to fall asleep: ~? minutes.  Awakenings: 3 times per night, 5-10 minutes to return to sleep.  Napping.  1-2 days per week, 10-20 minutes per nap.    Yes for RLS screen.  Yes for sleep walking.  Yes for dream enactment behavior.  Yes for bruxism.    Yes for morning headaches.  Yes for snoring.  No for observed apnea.  Yes for FHx of HOLLEY.    SHx:  , lives with .  Does craft shows in summer.    A/P:    1.)  History of narcolepsy without cataplexy  - Based on prior PSG and MSLT in 2012, noted for no preceding actigraphy and unclear if urine toxicology done after MSLT.  - Current medication regiment appears to be adderall IR 40mg QAM, zolpidem 5mg.    2.)  RLS  - Based on medication list including pramipexole 0.5mg at bedtime    3.)  Potential newer concern for sleep-disordered breathing with STOP-BANG score of 3.  4.)  Unclear nocturnal behaviors, sounding most consistent with NREM parasomnias.    Overall, would recommend in-lab PSG to evaluate for HOLLEY and to see if better elucidate nocturnal behaviors.    ---  This note was written with the assistance of the Dragon voice-dictation technology software. The final document, although reviewed, may contain errors. For corrections, please contact the office.    Jj Tabor MD    Sleep Medicine  [facility]  ([phone])  [facility]  ([phone])
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
## 2022-05-12 DIAGNOSIS — G47.33 OSA (OBSTRUCTIVE SLEEP APNEA): Primary | ICD-10-CM

## 2022-05-31 DIAGNOSIS — G25.81 RESTLESS LEGS SYNDROME (RLS): ICD-10-CM

## 2022-06-01 RX ORDER — ZOLPIDEM TARTRATE 5 MG/1
TABLET ORAL
Qty: 30 TABLET | Refills: 1 | Status: SHIPPED | OUTPATIENT
Start: 2022-06-01 | End: 2023-05-08

## 2022-06-01 NOTE — TELEPHONE ENCOUNTER
Zolpidem      Last Written Prescription Date:  12/9/20  Last Fill Quantity: 30,   # refills: 1  Last Office Visit: 4/15/22  Future Office visit:    Next 5 appointments (look out 90 days)    Aug 01, 2022 11:00 AM  (Arrive by 10:45 AM)  SHORT with HC COLLECTION  [facility] [place] ([facility] [place] ) [address]  [place] [address]  [phone]

## 2022-06-02 ENCOUNTER — HOSPITAL ENCOUNTER (EMERGENCY)
Facility: CLINIC | Age: 72
Discharge: HOME | End: 2022-06-02
Attending: EMERGENCY MEDICINE | Admitting: EMERGENCY MEDICINE
Payer: MEDICARE

## 2022-06-02 VITALS
DIASTOLIC BLOOD PRESSURE: 80 MMHG | WEIGHT: 159 LBS | RESPIRATION RATE: 18 BRPM | HEIGHT: 63 IN | SYSTOLIC BLOOD PRESSURE: 132 MMHG | OXYGEN SATURATION: 98 % | BODY MASS INDEX: 28.17 KG/M2 | HEART RATE: 86 BPM | TEMPERATURE: 97.2 F

## 2022-06-02 DIAGNOSIS — R42 VERTIGO: ICD-10-CM

## 2022-06-02 DIAGNOSIS — R90.89 ABNORMAL BRAIN MRI: ICD-10-CM

## 2022-06-02 LAB
ALBUMIN SERPL-MCNC: 3.9 G/DL (ref 3.4–5)
ALBUMIN UR-MCNC: 10 MG/DL
ALP SERPL-CCNC: 89 U/L (ref 40–150)
ALT SERPL W P-5'-P-CCNC: 18 U/L (ref 0–50)
ANION GAP SERPL CALCULATED.3IONS-SCNC: 4 MMOL/L (ref 3–14)
APPEARANCE UR: CLEAR
AST SERPL W P-5'-P-CCNC: 17 U/L (ref 0–45)
ATRIAL RATE - MUSE: 78 BPM
BASOPHILS # BLD AUTO: 0.1 10E3/UL (ref 0–0.2)
BASOPHILS NFR BLD AUTO: 1 %
BILIRUB SERPL-MCNC: 0.5 MG/DL (ref 0.2–1.3)
BILIRUB UR QL STRIP: NEGATIVE
BUN SERPL-MCNC: 22 MG/DL (ref 7–30)
CALCIUM SERPL-MCNC: 9 MG/DL (ref 8.5–10.1)
CHLORIDE BLD-SCNC: 108 MMOL/L (ref 94–109)
CHOLEST SERPL-MCNC: 194 MG/DL
CO2 SERPL-SCNC: 27 MMOL/L (ref 20–32)
COLOR UR AUTO: YELLOW
CREAT SERPL-MCNC: 0.86 MG/DL (ref 0.52–1.04)
DIASTOLIC BLOOD PRESSURE - MUSE: NORMAL MMHG
EOSINOPHIL # BLD AUTO: 0.1 10E3/UL (ref 0–0.7)
EOSINOPHIL NFR BLD AUTO: 2 %
ERYTHROCYTE [DISTWIDTH] IN BLOOD BY AUTOMATED COUNT: 12.2 % (ref 10–15)
GFR SERPL CREATININE-BSD FRML MDRD: 70 ML/MIN/1.73M2
GLUCOSE BLD-MCNC: 118 MG/DL (ref 70–99)
GLUCOSE UR STRIP-MCNC: NEGATIVE MG/DL
HCT VFR BLD AUTO: 42 % (ref 35–47)
HDLC SERPL-MCNC: 77 MG/DL
HGB BLD-MCNC: 13.6 G/DL (ref 11.7–15.7)
HGB UR QL STRIP: NEGATIVE
HOLD SPECIMEN: NORMAL
HOLD SPECIMEN: NORMAL
IMM GRANULOCYTES # BLD: 0 10E3/UL
IMM GRANULOCYTES NFR BLD: 0 %
INTERPRETATION ECG - MUSE: NORMAL
KETONES UR STRIP-MCNC: NEGATIVE MG/DL
LDLC SERPL CALC-MCNC: 101 MG/DL
LEUKOCYTE ESTERASE UR QL STRIP: NEGATIVE
LYMPHOCYTES # BLD AUTO: 1.9 10E3/UL (ref 0.8–5.3)
LYMPHOCYTES NFR BLD AUTO: 29 %
MCH RBC QN AUTO: 30.4 PG (ref 26.5–33)
MCHC RBC AUTO-ENTMCNC: 32.4 G/DL (ref 31.5–36.5)
MCV RBC AUTO: 94 FL (ref 78–100)
MONOCYTES # BLD AUTO: 0.4 10E3/UL (ref 0–1.3)
MONOCYTES NFR BLD AUTO: 7 %
MUCOUS THREADS #/AREA URNS LPF: PRESENT /LPF
NEUTROPHILS # BLD AUTO: 4 10E3/UL (ref 1.6–8.3)
NEUTROPHILS NFR BLD AUTO: 61 %
NITRATE UR QL: NEGATIVE
NONHDLC SERPL-MCNC: 117 MG/DL
NRBC # BLD AUTO: 0 10E3/UL
NRBC BLD AUTO-RTO: 0 /100
P AXIS - MUSE: 67 DEGREES
PH UR STRIP: 6 [PH] (ref 5–7)
PLATELET # BLD AUTO: 313 10E3/UL (ref 150–450)
POTASSIUM BLD-SCNC: 3.7 MMOL/L (ref 3.4–5.3)
PR INTERVAL - MUSE: 158 MS
PROT SERPL-MCNC: 7.6 G/DL (ref 6.8–8.8)
QRS DURATION - MUSE: 92 MS
QT - MUSE: 380 MS
QTC - MUSE: 433 MS
R AXIS - MUSE: 31 DEGREES
RBC # BLD AUTO: 4.47 10E6/UL (ref 3.8–5.2)
RBC URINE: 1 /HPF
SODIUM SERPL-SCNC: 139 MMOL/L (ref 133–144)
SP GR UR STRIP: 1.03 (ref 1–1.03)
SQUAMOUS EPITHELIAL: <1 /HPF
SYSTOLIC BLOOD PRESSURE - MUSE: NORMAL MMHG
T AXIS - MUSE: 48 DEGREES
TRIGL SERPL-MCNC: 79 MG/DL
UROBILINOGEN UR STRIP-MCNC: 2 MG/DL
VENTRICULAR RATE- MUSE: 78 BPM
WBC # BLD AUTO: 6.5 10E3/UL (ref 4–11)
WBC URINE: 2 /HPF

## 2022-06-02 PROCEDURE — 81001 URINALYSIS AUTO W/SCOPE: CPT | Performed by: EMERGENCY MEDICINE

## 2022-06-02 PROCEDURE — 80053 COMPREHEN METABOLIC PANEL: CPT | Performed by: EMERGENCY MEDICINE

## 2022-06-02 PROCEDURE — 250N000013 HC RX MED GY IP 250 OP 250 PS 637: Performed by: EMERGENCY MEDICINE

## 2022-06-02 PROCEDURE — 85025 COMPLETE CBC W/AUTO DIFF WBC: CPT | Performed by: EMERGENCY MEDICINE

## 2022-06-02 PROCEDURE — 93005 ELECTROCARDIOGRAM TRACING: CPT

## 2022-06-02 PROCEDURE — 99284 EMERGENCY DEPT VISIT MOD MDM: CPT

## 2022-06-02 PROCEDURE — 36415 COLL VENOUS BLD VENIPUNCTURE: CPT | Performed by: EMERGENCY MEDICINE

## 2022-06-02 PROCEDURE — 80061 LIPID PANEL: CPT | Performed by: EMERGENCY MEDICINE

## 2022-06-02 RX ORDER — DIAZEPAM 2 MG
2 TABLET ORAL ONCE
Status: COMPLETED | OUTPATIENT
Start: 2022-06-02 | End: 2022-06-02

## 2022-06-02 RX ORDER — DIAZEPAM 2 MG
2 TABLET ORAL EVERY 12 HOURS PRN
Qty: 20 TABLET | Refills: 0 | Status: SHIPPED | OUTPATIENT
Start: 2022-06-02 | End: 2023-05-08

## 2022-06-02 RX ORDER — SIMVASTATIN 10 MG
10 TABLET ORAL AT BEDTIME
Qty: 30 TABLET | Refills: 0 | Status: SHIPPED | OUTPATIENT
Start: 2022-06-02 | End: 2022-07-25

## 2022-06-02 RX ADMIN — DIAZEPAM 2 MG: 2 TABLET ORAL at 18:24

## 2022-06-02 NOTE — ED PROVIDER NOTES
History   Chief Complaint:  Dizziness (/)     Anya Vicente is a 72 year old female with history of vertigo and small vessel ischemia who presents with recurrent vertigo today.  Started around 3 or 330 when she was going to  her grandson.  Then driving back after picking up she felt less confident driving and pulled over.  Had her  come and get them and they are all together here.  [place] Valium since meclizine made her sick.  She had an episode of some brain fogginess earlier today where she was having difficulty figuring out who she was going to call on her phone.  This was after her  came to get her.  She is noted similar with her episodes in the past.  She does not have any vision changes, headache, neck pain, nausea, vomiting.  She has had urinary frequency for months and was started on oxybutynin a few weeks ago.  Symptoms are improved but she still has nocturnal frequency.  No dysuria.  No fevers or cough.  Is not on aspirin or Plavix.  States that she was referred to neurology but has not been able to see them yet.  On chart review she had an MRI, CT, CTA in March of this year.  Had tried vestibular therapy but it was not improving her symptoms.    Review of Systems  Ten system ROS reviewed and is negative except as above     Allergies:    Acetaminophen  Aluminum  Clonidine  Codamine  Codeine  Hydrocodone  Lyrica  Meperidine  Oxycodone-Aspirin  Pimozide  Propoxyphene Napsylate  Seasonal Allergies    Medications:    zolpidem (AMBIEN) 5 MG tablet  albuterol (PROAIR HFA/PROVENTIL HFA/VENTOLIN HFA) 108 (90 Base) MCG/ACT inhaler  amphetamine-dextroamphetamine (ADDERALL) 20 MG tablet  Calcium Carbonate-Vit D-Min (CALCIUM 1200 PO)  cholecalciferol (VITAMIN D3) 1250 mcg (27279 units) capsule  cyclobenzaprine (FLEXERIL) 5 MG tablet  docusate sodium (COLACE) 100 MG capsule  hydrochlorothiazide (HYDRODIURIL) 50 MG tablet  ipratropium -  albuterol 0.5 mg/2.5 mg/3 mL (DUONEB) 0.5-2.5 (3) MG/3ML neb solution  Magnesium Oxide 500 MG CAPS  Multiple Vitamins-Minerals (PRESERVISION AREDS 2) CAPS  ORDER FOR DME  oxybutynin ER (DITROPAN-XL) 5 MG 24 hr tablet  polyethylene glycol (MIRALAX) 17 GM/Dose powder  pramipexole (MIRAPEX) 0.5 MG tablet  predniSONE (DELTASONE) 20 MG tablet  traMADol (ULTRAM) 50 MG tablet        Past Medical History:       Past Medical History:   Diagnosis Date     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 02/22/2011     Depression 01/01/2011     Esophageal reflux 02/22/2011     Fibromyalgia 03/18/2005     Insomnia 08/27/2014     Irritable bowel syndrome 02/22/2011     Menopausal or female climacteric states 02/21/2007     Mitral valve disorders(424.0) 02/22/2011     Myalgia and myositis, unspecified 02/25/2008     Narcolepsy      Need for prophylactic hormone replacement therapy (postmenopausal)      Other affections of shoulder region, not elsewhere 10/01/2008     Other follow-up examination(V67.59) 03/18/2005     Other screening mammogram 09/29/2006     Other specified pre-operative examination 05/04/2005     Posttraumatic stress disorder 02/22/2011     Restless legs syndrome (RLS) 02/22/2011     Sebaceous cyst 08/19/2005     Tourette's disorder 02/22/2011     Ventral hernia 09/15/2011         Past Surgical History:      Past Surgical History:   Procedure Laterality Date     BILATERAL CATARACT EXTRACTION  01/01/2006 01/01/2011     Bunion Surgery > RT  01/01/2010     COLONOSCOPY  12/23/2013    Procedure: COLONOSCOPY;  COLONOSCOPY;  Surgeon: Kasia Enrique DO;  Location: [facility] OR     D&C  01/01/1991 01/01/2011     deviated septum repair  01/01/2012     ECHOCARDIOGRAM  01/01/2006 01/01/2011     HEMORRHOIDECTOMY  01/01/1979 01/01/2011     HYSTERECTOMY  01/01/1995 01/01/2011     LAPAROSCOPIC HERNIORRHAPHY VENTRAL  01/16/2014    Procedure: LAPAROSCOPIC HERNIORRHAPHY VENTRAL;  LAPAROSCOPIC VENTRAL HERNIA REPAIR W/  MESH;  Surgeon: Kasia Enrique DO;  Location: [facility] OR     leg repair after fx  >LT  01/01/2009     RELEASE CARPAL TUNNEL  01/01/2010    RT     TRIGGER  THUMB RELEASE  01/01/2011        Family History:      Family History   Problem Relation Age of Onset     Ovarian Cancer Mother      Depression Mother      Hypertension Mother      Cancer Mother         Cervical     Parkinsonism Mother      Alcohol/Drug Father 42        Motor Vehicle Accident due to Alcoholism - Cause of Death     Other - See Comments Sister         ADD/ADHD     Other - See Comments Sister         Fibromyalgia     Diabetes Sister      Diabetes Sister      Other - See Comments Sister         ADD/ADHD     Other - See Comments Son         ADD/ADHD     Other - See Comments Son         ADD/ADHD     Diabetes Maternal Uncle      Parkinsonism Maternal Uncle          Social History:  Social History     Socioeconomic History     Marital status:      Spouse name: Not on file     Number of children: Not on file     Years of education: Not on file     Highest education level: Not on file   Occupational History     Occupation: RN - MENTAL HEALTH     Employer: RETIRED   Tobacco Use     Smoking status: Never Smoker     Smokeless tobacco: Never Used   Substance and Sexual Activity     Alcohol use: Yes     Comment: RARELY     Drug use: No     Sexual activity: Yes     Partners: Male   Other Topics Concern      Service Not Asked     Blood Transfusions Yes     Comment: PERMITS     Caffeine Concern Yes     Comment: COFFEE - 2 CUPS DAILY     Occupational Exposure Not Asked     Hobby Hazards Not Asked     Sleep Concern Not Asked     Stress Concern Not Asked     Weight Concern Not Asked     Special Diet Not Asked     Back Care Not Asked     Exercise Yes     Comment: 2-3 TIMES WEEKLY     Bike Helmet Not Asked     Seat Belt Not Asked     Self-Exams Not Asked     Parent/sibling w/ CABG, MI or angioplasty before 65F 55M? No   Social History Narrative     Not on  "file     Social Determinants of Health     Financial Resource Strain: Not on file   Food Insecurity: Not on file   Transportation Needs: Not on file   Physical Activity: Not on file   Stress: Not on file   Social Connections: Not on file   Intimate Partner Violence: Not on file   Housing Stability: Not on file       Physical Exam     Patient Vitals for the past 24 hrs:   BP Temp Temp src Pulse Resp SpO2 Height Weight   06/02/22 1724 (!) 147/71 97.2  F (36.2  C) Temporal 86 16 100 % 1.6 m (5' 3\") 72.1 kg (159 lb)       Physical Exam  Eyes:  Sclera white; Pupils are equal and round; EOMI w/o nystagmus or double vision  ENT:    External ears and nares normal  CV:  Rate as above with regular rhythm   Resp:  Breath sounds clear and equal bilaterally    Non-labored, no retractions or accessory muscle use  GI:  Abdomen is soft, non-tender, non-distended    No rebound tenderness or peritoneal features  MS:  Moves all extremities  Skin:  Warm and dry  Neuro:  Speech is normal.  No truncal ataxia with sitting upright or standing upright.  Somewhat shuffling gait with small steps while walking that she states is not her baseline.    [employer] Stroke Scale  Exam Interval: Baseline   Score    Level of consciousness: (0)   Alert, keenly responsive    LOC questions: (0)   Answers both questions correctly    LOC commands: (0)   Performs both tasks correctly    Best gaze: (0)   Normal    Visual: (0)   No visual loss    Facial palsy: (0)   Normal symmetrical movements    Motor arm (left): (0)   No drift    Motor arm (right): (0)   No drift    Motor leg (left): (0)   No drift    Motor leg (right): (0)   No drift    Limb ataxia: (0)   Absent    Sensory: (0)   Normal- no sensory loss    Best language: (0)   Normal- no aphasia    Dysarthria: (0)   Normal    Extinction and inattention: (0)   No abnormality        Total Score:  0           Emergency Department Course   ECG  ECG results from 06/02/22   EKG 12 lead     " Value    Systolic Blood Pressure     Diastolic Blood Pressure     Ventricular Rate 78    Atrial Rate 78    ID Interval 158    QRS Duration 92        QTc 433    P Axis 67    R AXIS 31    T Axis 48    Interpretation ECG      Sinus rhythm  Normal ECG  No previous ECGs available  Initial STEMI check at 1738: no STEMI.  Agree with findings; no change vs scanned EKG from 2/11/20 - Dr Oliveira       Imaging:  No orders to display     Laboratory:  Labs Ordered and Resulted from Time of ED Arrival to Time of ED Departure   COMPREHENSIVE METABOLIC PANEL       Result Value    Sodium 139      Potassium 3.7      Chloride 108      Carbon Dioxide (CO2)        Anion Gap        Urea Nitrogen        Creatinine        Calcium        Glucose        Alkaline Phosphatase        AST        ALT        Protein Total        Albumin        Bilirubin Total        GFR Estimate       CBC WITH PLATELETS AND DIFFERENTIAL    WBC Count 6.5      RBC Count 4.47      Hemoglobin 13.6      Hematocrit 42.0      MCV 94      MCH 30.4      MCHC 32.4      RDW 12.2      Platelet Count 313      % Neutrophils 61      % Lymphocytes 29      % Monocytes 7      % Eosinophils 2      % Basophils 1      % Immature Granulocytes 0      NRBCs per 100 WBC 0      Absolute Neutrophils 4.0      Absolute Lymphocytes 1.9      Absolute Monocytes 0.4      Absolute Eosinophils 0.1      Absolute Basophils 0.1      Absolute Immature Granulocytes 0.0      Absolute NRBCs 0.0     ROUTINE UA WITH MICROSCOPIC        Interventions:  Medications   diazepam (VALIUM) tablet 2 mg (2 mg Oral Given 6/2/22 1824)        Medical Decision Making:  Symptoms are similar to prior occurrences.  She has a recent outpatient MRI which was reviewed.  There was concern due to the read of advanced chronic microvascular ischemic changes which are considered to be risk factors for strokes but not indicative of symptomatic strokes.  She has no other focal findings to suggest that this would be secondary to  an acute ischemic event.  I do not think that a new MRI is indicated.  She has also had recent vessel imaging and I do not think this needs to be repeated.  She is symptomatically improved with medications here.  I will discharge her with similar as she does not have her medications with her.  She will be discharged with an aspirin and a statin for stroke prevention.  Referral to neurology was placed.    Diagnosis:    ICD-10-CM    1. Vertigo  R42 Adult Neurology  Referral   2. Abnormal brain MRI  R90.89 Adult Neurology  Referral    microvascular ischemic changes       Discharge Medications:  Discharge Medication List as of 6/2/2022  7:30 PM      START taking these medications    Details   diazepam (VALIUM) 2 MG tablet Take 1 tablet (2 mg) by mouth every 12 hours as needed (vertigo), Disp-20 tablet, R-0, E-Prescribe      simvastatin (ZOCOR) 10 MG tablet Take 1 tablet (10 mg) by mouth At Bedtime, Disp-30 tablet, R-0, E-Prescribe              Marylu Oliveira MD  06/06/22 0924

## 2022-06-02 NOTE — ED TRIAGE NOTES
Pt with Hx of vertigo presents with dizziness. Pt is also being followed by neuro due to small vessel ischemia.      Triage Assessment     Row Name 06/02/22 4171       Triage Assessment (Adult)    Airway WDL WDL       Respiratory WDL    Respiratory WDL WDL       Skin Circulation/Temperature WDL    Skin Circulation/Temperature WDL WDL       Cardiac WDL    Cardiac WDL WDL       Peripheral/Neurovascular WDL    Peripheral Neurovascular WDL WDL       Cognitive/Neuro/Behavioral WDL    Cognitive/Neuro/Behavioral WDL WDL

## 2022-06-02 NOTE — CONSULTS
Brief Neuro Consult    S: Patient here for chronic vertigo has op neuro appointment curb sided by ED for 3/29 MRI brain interpretation    O:     MRI brain    IMPRESSION: Moderate to advanced chronic microvascular ischemic  changes of the supratentorial white matter and kailash.    A: 71yo woman with chronic vertigo, extensive microvascular disease on 3/2022 MRI    P:    -Follow-up neuro appointment op  -Reasonable to give Aspirin 81mg every day and statin   -BP control per primary    Brian Ling MD

## 2022-06-03 NOTE — DISCHARGE INSTRUCTIONS
Start taking a full strength aspirin (325mg) once a day    Discharge Instructions  Vertigo  You have been diagnosed with vertigo.  This is a dizzy feeling often described as spinning or that the room is moving around you. You will often have nausea (sick to your stomach), vomiting (throwing up), and balance problems with it.  Vertigo is usually caused by a problem in the inner ear which helps control your balance.  Many things can cause vertigo, including calcium collections in the inner ear, a virus infection of the inner ear, concussion, migraine, and some medicines.  Luckily, these causes are not life threatening and will eventually go away.  However, sometimes there is a serious problem that does not show up right away.  Generally, every Emergency Department visit should have a follow-up clinic visit with either a primary or a specialty clinic/provider. Please follow-up as instructed by your emergency provider today.  Return to the Emergency Department if you have:  New or severe headache.  Double vision (seeing two of things).  Trouble speaking or hearing.  Weakness or trouble moving/using one side of your body.  Passing out.  Numbness or tingling.  Chest pain.  Vomiting that will not stop.    Treatment:  There are several commonly prescribed medications:  Antihistamines such as meclizine (Antivert ), dimenhydrinate (Dramamine ), or diphenhydramine (Benadryl ).  Prescription anti-nausea medicines, such as promethazine (Phenergan ), metoclopramide (Reglan ), or ondansetron (Zofran ).  Prescription sedative medicines, such as diazepam (Valium ), lorazepam (Ativan ), or clonazepam (Klonopin ).  Most of these medicines make you sleepy, and you should not take them before you work or drive. You should only take prescription medicines to treat severe vertigo symptoms, and you should stop the medicine when your symptoms improve.    Follow Up:  If you have vertigo longer than three days, it is important that you follow  up either with your primary provider or an Ear, Nose, and Throat (ENT) specialist.  You may need further testing to evaluate your vertigo and you may also need  vestibular  therapy which is a special form of physical therapy to make the vertigo go away.    If you were given a prescription for medicine here today, be sure to read all of the information (including the package insert) that comes with your prescription.  This will include important information about the medicine, its side effects, and any warnings that you need to know about.  The pharmacist who fills the prescription can provide more information and answer questions you may have about the medicine.  If you have questions or concerns that the pharmacist cannot address, please call or return to the Emergency Department.     Remember that you can always come back to the Emergency Department if you are not able to see your regular provider in the amount of time listed above, if you get any new symptoms, or if there is anything that worries you.

## 2022-07-23 ASSESSMENT — PATIENT HEALTH QUESTIONNAIRE - PHQ9
SUM OF ALL RESPONSES TO PHQ QUESTIONS 1-9: 6
10. IF YOU CHECKED OFF ANY PROBLEMS, HOW DIFFICULT HAVE THESE PROBLEMS MADE IT FOR YOU TO DO YOUR WORK, TAKE CARE OF THINGS AT HOME, OR GET ALONG WITH OTHER PEOPLE: SOMEWHAT DIFFICULT
SUM OF ALL RESPONSES TO PHQ QUESTIONS 1-9: 6

## 2022-07-25 ENCOUNTER — OFFICE VISIT (OUTPATIENT)
Dept: NEUROLOGY | Facility: OTHER | Age: 72
End: 2022-07-25
Attending: PSYCHIATRY & NEUROLOGY
Payer: COMMERCIAL

## 2022-07-25 VITALS
OXYGEN SATURATION: 98 % | TEMPERATURE: 98.6 F | SYSTOLIC BLOOD PRESSURE: 134 MMHG | DIASTOLIC BLOOD PRESSURE: 83 MMHG | HEART RATE: 101 BPM | BODY MASS INDEX: 26.63 KG/M2 | RESPIRATION RATE: 16 BRPM | HEIGHT: 64 IN | WEIGHT: 156 LBS

## 2022-07-25 DIAGNOSIS — R90.89 ABNORMAL BRAIN MRI: ICD-10-CM

## 2022-07-25 DIAGNOSIS — I63.81 CEREBROVASCULAR ACCIDENT (CVA) DUE TO OCCLUSION OF SMALL ARTERY (H): Primary | ICD-10-CM

## 2022-07-25 DIAGNOSIS — R73.03 PREDIABETES: ICD-10-CM

## 2022-07-25 DIAGNOSIS — R42 VERTIGO: ICD-10-CM

## 2022-07-25 LAB — HBA1C MFR BLD: 5.9 % (ref 4–6.2)

## 2022-07-25 PROCEDURE — G0463 HOSPITAL OUTPT CLINIC VISIT: HCPCS | Performed by: PSYCHIATRY & NEUROLOGY

## 2022-07-25 PROCEDURE — 99204 OFFICE O/P NEW MOD 45 MIN: CPT | Performed by: PSYCHIATRY & NEUROLOGY

## 2022-07-25 PROCEDURE — 83036 HEMOGLOBIN GLYCOSYLATED A1C: CPT | Mod: ZL | Performed by: PSYCHIATRY & NEUROLOGY

## 2022-07-25 PROCEDURE — 36415 COLL VENOUS BLD VENIPUNCTURE: CPT | Mod: ZL | Performed by: PSYCHIATRY & NEUROLOGY

## 2022-07-25 RX ORDER — SIMVASTATIN 10 MG
10 TABLET ORAL AT BEDTIME
Qty: 90 TABLET | Refills: 1 | Status: SHIPPED | OUTPATIENT
Start: 2022-07-25 | End: 2022-12-27

## 2022-07-25 ASSESSMENT — PAIN SCALES - GENERAL: PAINLEVEL: NO PAIN (0)

## 2022-07-25 NOTE — PROGRESS NOTES
Patient here for a consult for Vertigo and Degeneration of Brain Tissue.    Answers for HPI/ROS submitted by the patient on 7/23/2022  If you checked off any problems, how difficult have these problems made it for you to do your work, take care of things at home, or get along with other people?: Somewhat difficult  PHQ9 TOTAL SCORE: 6    Dorene Shen MA on 7/25/2022 at 12:57 PM

## 2022-07-25 NOTE — LETTER
7/25/2022         RE: Anya Vicente  [address] [facility] [address]        Dear Colleague,    Thank you for referring your patient, Anya Vicente, to the [facility]. Please see a copy of my visit note below.    Patient here for a consult for Vertigo and Degeneration of Brain Tissue.    Answers for HPI/ROS submitted by the patient on 7/23/2022  If you checked off any problems, how difficult have these problems made it for you to do your work, take care of things at home, or get along with other people?: Somewhat difficult  PHQ9 TOTAL SCORE: 6    Dorene Shen MA on 7/25/2022 at 12:57 PM        Neurology Clinic - New Consultation  7/25/2022    Reason for Consultation: vertigo    Requesting Provider: Cindy Danielle    History of Presenting Symptoms:  Anya Vicente is a 72 year old female who presents today for evaluation of vertigo.  She states about 3-4 months ago she was dealing with vertigo.  She states it lasted for about 1.5 months.  She was put on meclizine which did not help, but they did do valium after which did help somewhat.  She was sent to physical therapy for her symptoms which improved in about 5 weeks.  She states the vertigo is completely gone now, but did have one episode about a month ago.  It happened while driving and she was started on asprin and simvastatin while working with urgent care.   She is not taking either right now.    She states when her vertigo started it was truly acute onset.  She states it was always there for at least 5 or 6 weeks, and was worse with head movements.   It never completely resolved even on huddle still.  She does feel her vertigo symptoms are now entirely resolved.    She states the physical therapist told them there was something on their exam that they didn't like which is why the recommended the MRI.    The MRI showed advanced supratentorial and pontine chronic microvascular ischemic changes but no  acute infarct.   Her MRI she states was done about one week after symptoms.      She does have a history of restless legs and is on mirapex which she finds immensely helpful.   She is also on adderall for narcolepsy and states she finds this immensely helpful as well.    She currently denies any headache, she is reporting slowly progressive vision changes and is planning on following up with an eye doctor.    ROS: Complete 10-point review of systems was negative except as noted in the HPI.    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)     Glucose intolerance (impaired glucose tolerance)     Insomnia     Major depressive disorder     Mixed stress and urge urinary incontinence     Leg cramps     Vitamin D deficiency disease     Complete rupture of rotator cuff     Esophageal reflux     Glucose intolerance     Myalgia and myositis     Open bimalleolar fracture     Other malaise and fatigue     Pain in joint, ankle and foot     Fibromyalgia     Scheurmann's disease     Other secondary scoliosis, thoracolumbar region     Chronic constipation     Benign paroxysmal positional vertigo due to bilateral vestibular disorder     Moderate episode of recurrent major depressive disorder (H)     Asthma     Fibromyalgia  Past Surgical History:  Past Surgical History:   Procedure Laterality Date     BILATERAL CATARACT EXTRACTION  01/01/2006 01/01/2011     Bunion Surgery > RT  01/01/2010     COLONOSCOPY  12/23/2013    Procedure: COLONOSCOPY;  COLONOSCOPY;  Surgeon: Kasia Enrique DO;  Location: [facility] OR     D&C  01/01/1991 01/01/2011     deviated septum repair  01/01/2012     ECHOCARDIOGRAM  01/01/2006 01/01/2011     HEMORRHOIDECTOMY  01/01/1979 01/01/2011     HYSTERECTOMY  01/01/1995 01/01/2011     LAPAROSCOPIC HERNIORRHAPHY VENTRAL  01/16/2014    Procedure: LAPAROSCOPIC HERNIORRHAPHY VENTRAL;  LAPAROSCOPIC VENTRAL HERNIA REPAIR W/ MESH;  Surgeon: Klaus  "Kasia CARDENAS DO;  Location: [facility] OR     leg repair after fx  >LT  01/01/2009     RELEASE CARPAL TUNNEL  01/01/2010    RT     TRIGGER  THUMB RELEASE  01/01/2011       PCP: Cindy Danielle    Allergies:   Allergies   Allergen Reactions     Acetaminophen      Darvocet-N 100       Aluminum      Clonidine      Dropped BP Drastically     Codamine Itching and Other (See Comments)     \"Buzzy\"     Codeine      Hydrocodone      \"Patient states can take liquid med but not pill\".     Lyrica Headache, Itching and Swelling     Meperidine      Oxycodone-Aspirin Itching, Nausea and Vomiting and Other (See Comments)     \"Buzzy\"     Pimozide Other (See Comments)     Drowsiness     Propoxyphene Napsylate      Darvocet-N 100     Seasonal Allergies Other (See Comments)     Seasonal allergies.  Per 7/3/07, runny nose, stuffy, plugged ears.       Medications:  Current Outpatient Medications   Medication Sig Dispense Refill     albuterol (PROAIR HFA/PROVENTIL HFA/VENTOLIN HFA) 108 (90 Base) MCG/ACT inhaler INHALE 2 PUFFS INTO THE LUNGS EVERY 4 HOURS AS NEEDED FOR SHORTNESS OF BREATH/DYSPNEA. 8.5 g 0     amphetamine-dextroamphetamine (ADDERALL) 20 MG tablet TAKE 2 TABLETS BY MOUTH IN THE MORNING.  To avoid insomnia, last daily dose should be taken no less than 6 hours before bed.       Calcium Carbonate-Vit D-Min (CALCIUM 1200 PO) Take 1,200 mg by mouth daily        cholecalciferol (VITAMIN D3) 1250 mcg (03259 units) capsule        cyclobenzaprine (FLEXERIL) 5 MG tablet TAKE 1 TABLET (5 MG) BY MOUTH 3 TIMES DAILY AS NEEDED FOR MUSCLE SPASMS 90 tablet 3     diazepam (VALIUM) 2 MG tablet Take 1 tablet (2 mg) by mouth every 12 hours as needed (vertigo) 20 tablet 0     docusate sodium (COLACE) 100 MG capsule Take 100 mg by mouth 3 times daily as needed       hydrochlorothiazide (HYDRODIURIL) 50 MG tablet TAKE 1 TABLET DAILY BY MOUTH AS NEEDED 90 tablet 2     ipratropium - albuterol 0.5 mg/2.5 mg/3 mL (DUONEB) 0.5-2.5 (3) MG/3ML neb solution " NEBULIZE CONTENTS OF 1 VIALEVERY 6 HOURS AS NEEDED FORSHORTNESS OF BREATH OR WHEEZING 90 mL 0     Magnesium Oxide 500 MG CAPS        Multiple Vitamins-Minerals (PRESERVISION AREDS 2) CAPS Take 1 capful by mouth daily       ORDER FOR DME Equipment being ordered: knee sleeve 1 Device 6     oxybutynin ER (DITROPAN-XL) 5 MG 24 hr tablet Take 1 tablet (5 mg) by mouth daily 30 tablet 3     polyethylene glycol (MIRALAX) 17 GM/Dose powder Take 17 g (1 capful) by mouth 2 times daily 850 g 3     pramipexole (MIRAPEX) 0.5 MG tablet TAKE 1 TABLET BY MOUTH AT BEDTIME - GENERIC FOR MIRAPEX 90 tablet 3     predniSONE (DELTASONE) 20 MG tablet TAKE 2 TABLETS BY MOUTH EVERY MORNING FOR 7 DAYS, 1 TABLET EVERY MORNING FOR 7 DAYS, THEN STOP 21 tablet 0     simvastatin (ZOCOR) 10 MG tablet Take 1 tablet (10 mg) by mouth At Bedtime 30 tablet 0     traMADol (ULTRAM) 50 MG tablet TAKE 1 TO 2 TABLETS BY MOUTH EVERY 6 HOURS AS NEEDED FOR MODERATE PAIN 60 tablet 0     zolpidem (AMBIEN) 5 MG tablet TAKE 1 TABLET (5 MG) BY MOUTH NIGHTLY AS NEEDED FORSLEEP 30 tablet 1       Family History:  Family History   Problem Relation Age of Onset     Ovarian Cancer Mother      Depression Mother      Hypertension Mother      Cancer Mother         Cervical     Parkinsonism Mother      Alcohol/Drug Father 42        Motor Vehicle Accident due to Alcoholism - Cause of Death     Other - See Comments Sister         ADD/ADHD     Other - See Comments Sister         Fibromyalgia     Diabetes Sister      Diabetes Sister      Other - See Comments Sister         ADD/ADHD     Other - See Comments Son         ADD/ADHD     Other - See Comments Son         ADD/ADHD     Diabetes Maternal Uncle      Parkinsonism Maternal Uncle      Strong family history of Parkinson's no known early strokes.  Social History:  Social History     Socioeconomic History     Marital status:      Spouse name: Not on file     Number of children: Not on file     Years of education: Not on  "file     Highest education level: Not on file   Occupational History     Occupation: RN - MENTAL HEALTH     Employer: RETIRED   Tobacco Use     Smoking status: Never Smoker     Smokeless tobacco: Never Used   Substance and Sexual Activity     Alcohol use: Yes     Comment: RARELY     Drug use: No     Sexual activity: Yes     Partners: Male   Other Topics Concern      Service Not Asked     Blood Transfusions Yes     Comment: PERMITS     Caffeine Concern Yes     Comment: COFFEE - 2 CUPS DAILY     Occupational Exposure Not Asked     Hobby Hazards Not Asked     Sleep Concern Not Asked     Stress Concern Not Asked     Weight Concern Not Asked     Special Diet Not Asked     Back Care Not Asked     Exercise Yes     Comment: 2-3 TIMES WEEKLY     Bike Helmet Not Asked     Seat Belt Not Asked     Self-Exams Not Asked     Parent/sibling w/ CABG, MI or angioplasty before 65F 55M? No   Social History Narrative     Not on file     Social Determinants of Health     Financial Resource Strain: Not on file   Food Insecurity: Not on file   Transportation Needs: Not on file   Physical Activity: Not on file   Stress: Not on file   Social Connections: Not on file   Intimate Partner Violence: Not on file   Housing Stability: Not on file     Alcohol: rare  Tobacco: none  Illicit drugs: none, medical marijuana for a year but stopped  Caffeine: none    Physical Exam:  Vitals: /83 (BP Location: Right arm, Patient Position: Sitting, Cuff Size: Adult Regular)   Pulse 101   Temp 98.6  F (37  C) (Tympanic)   Resp 16   Ht 1.632 m (5' 4.25\")   Wt 70.8 kg (156 lb)   SpO2 98%   BMI 26.57 kg/m     General: Seated comfortably in no acute distress.  HEENT: Neck supple  Heart: Mildly tachycardic, no murmur  Lungs: breathing comfortably, no wheezing or rhonchi heard  GI: Non obese  Extremities: no edema  Skin: No rashes  Neurologic:     Mental Status: Fully alert, attentive and oriented. Speech clear and fluent, no paraphasic errors.   "   Cranial Nerves:  PERRL. EOMI with no nystagmus and normal smooth pursuit. Facial sensation intact/symmetric. Facial movements symmetric. Hearing not formally tested but intact to conversation. Palate elevation symmetric, uvula midline. No dysarthria. Shoulder shrug strong bilaterally. Tongue protrusion midline.     Motor: No tremors or other abnormal movements observed. Muscle tone normal throughout. Strength 5/5 throughout upper and lower extremities.     Deep Tendon Reflexes: 2+/symmetric throughout upper and lower extremities. No clonus.      Sensory: Intact/symmetric to light touch throughout upper and lower extremities.  No sensory extinction.  Negative Romberg.     Coordination: Finger-nose-finger and heel-shin intact without dysmetria.      Gait: Normal, steady casual gait.  Did have some difficulty with tandem but was able to complete.    Hints exam with no nystagmus, no skew deviation, and head impulse was accurate.    Pertinent Investigations:  MRI brain: Very mild atrophy noted.  Moderate to advanced supratentorial and pontine chronic microvascular ischemic changes.  On close inspection in the posterior kailash near the level of the midbrain there does appear to be very mild increased DWI signal with ADC correlate.  There is subtle FLAIR change in that region as well.    Assessment:  #CVA  #Prediabetes  #Vertigo  #abnormal brain MRI      Ms. Vicente is a 72-year-old female with past medical history outlined above who presents after an episode of vertigo and abnormal MRI of the brain.  The description of her vertigo is not consistent with BPPV or other conditions like Ménière's or superior canal dehiscence.  Most likely etiologies would be either a small stroke, or possibly vestibular neuritis.  Upon my review of the MRI there is a small area of signal change in the posterior kailash that I think could be consistent with a small stroke especially when not imaged in the acute setting.  That being said the  length of time that she had symptoms with a stroke of the size is a bit unusual and would fit better with vestibular neuritis.  I cannot definitively say either way at this time what the etiology of her vertigo was but fortunately it has resolved.  She does have significant small vessel ischemic changes on her brain MRI.  She does have hypertension and is on Adderall which may predispose her to this.  At this point I think it is unknown if fibromyalgia can cause some of these changes as well.  With the question of stroke and these MRI changes would prefer to treat her as stroke and continue her aspirin and statin that were prescribed previously.  Given the risk of second stroke in the setting of untreated for stroke I think this would be the safer option.  I did inform her that if she were to have similar symptoms in the future that she should go to the ED to be imaged more emergently which may help delineate if this is vascular or vestibular neuritis in the future.    Plan:  - Continue simvastatin and asprin 81mg previously prescribed.  Did give 6-month supply of simvastatin until able to follow-up with primary care  -Recommend discuss with her prescriber of Adderall if there were any other options, as this may predispose her to stroke risk in the setting of white matter changes.  At this time she finds the agent helpful and does not want to discontinue as does not think her quality of life would be sufficient.  She does understand there is a stroke risk in the setting.  -I also discussed nonpharmacologic measures including Mediterranean diet and aerobic exercise  - Follow with eye doctor as planned for vision changes    Follow up in Neurology clinic as needed should new concerns arise.    Radha Villar DO   of Neurology      54 min spent on the date of the encounter in  9 min chart review, 35 min patient visit, review of tests, 10 min documentation and/or discussion with other providers about  the issues documented above.   Answers for HPI/ROS submitted by the patient on 7/23/2022  If you checked off any problems, how difficult have these problems made it for you to do your work, take care of things at home, or get along with other people?: Somewhat difficult  PHQ9 TOTAL SCORE: 6          Again, thank you for allowing me to participate in the care of your patient.        Sincerely,        Radha Villar MD

## 2022-07-25 NOTE — PROGRESS NOTES
Neurology Clinic - New Consultation  7/25/2022    Reason for Consultation: vertigo    Requesting Provider: Cindy Danielle    History of Presenting Symptoms:  Anya Vicente is a 72 year old female who presents today for evaluation of vertigo.  She states about 3-4 months ago she was dealing with vertigo.  She states it lasted for about 1.5 months.  She was put on meclizine which did not help, but they did do valium after which did help somewhat.  She was sent to physical therapy for her symptoms which improved in about 5 weeks.  She states the vertigo is completely gone now, but did have one episode about a month ago.  It happened while driving and she was started on asprin and simvastatin while working with urgent care.   She is not taking either right now.    She states when her vertigo started it was truly acute onset.  She states it was always there for at least 5 or 6 weeks, and was worse with head movements.   It never completely resolved even on huddle still.  She does feel her vertigo symptoms are now entirely resolved.    She states the physical therapist told them there was something on their exam that they didn't like which is why the recommended the MRI.    The MRI showed advanced supratentorial and pontine chronic microvascular ischemic changes but no acute infarct.   Her MRI she states was done about one week after symptoms.      She does have a history of restless legs and is on mirapex which she finds immensely helpful.   She is also on adderall for narcolepsy and states she finds this immensely helpful as well.    She currently denies any headache, she is reporting slowly progressive vision changes and is planning on following up with an eye doctor.    ROS: Complete 10-point review of systems was negative except as noted in the HPI.    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)     Glucose intolerance (impaired glucose tolerance)  "    Insomnia     Major depressive disorder     Mixed stress and urge urinary incontinence     Leg cramps     Vitamin D deficiency disease     Complete rupture of rotator cuff     Esophageal reflux     Glucose intolerance     Myalgia and myositis     Open bimalleolar fracture     Other malaise and fatigue     Pain in joint, ankle and foot     Fibromyalgia     Scheurmann's disease     Other secondary scoliosis, thoracolumbar region     Chronic constipation     Benign paroxysmal positional vertigo due to bilateral vestibular disorder     Moderate episode of recurrent major depressive disorder (H)     Asthma     Fibromyalgia  Past Surgical History:  Past Surgical History:   Procedure Laterality Date     BILATERAL CATARACT EXTRACTION  01/01/2006 01/01/2011     Bunion Surgery > RT  01/01/2010     COLONOSCOPY  12/23/2013    Procedure: COLONOSCOPY;  COLONOSCOPY;  Surgeon: Kasia Enrique DO;  Location: [facility] OR     D&C  01/01/1991 01/01/2011     deviated septum repair  01/01/2012     ECHOCARDIOGRAM  01/01/2006 01/01/2011     HEMORRHOIDECTOMY  01/01/1979 01/01/2011     HYSTERECTOMY  01/01/1995 01/01/2011     LAPAROSCOPIC HERNIORRHAPHY VENTRAL  01/16/2014    Procedure: LAPAROSCOPIC HERNIORRHAPHY VENTRAL;  LAPAROSCOPIC VENTRAL HERNIA REPAIR W/ MESH;  Surgeon: Kasia Enrique DO;  Location: [facility] OR     leg repair after fx  >LT  01/01/2009     RELEASE CARPAL TUNNEL  01/01/2010    RT     TRIGGER  THUMB RELEASE  01/01/2011       PCP: Cindy Danielle    Allergies:   Allergies   Allergen Reactions     Acetaminophen      Darvocet-N 100       Aluminum      Clonidine      Dropped BP Drastically     Codamine Itching and Other (See Comments)     \"Buzzy\"     Codeine      Hydrocodone      \"Patient states can take liquid med but not pill\".     Lyrica Headache, Itching and Swelling     Meperidine      Oxycodone-Aspirin Itching, Nausea and Vomiting and Other (See Comments)     \"Buzzy\"     Pimozide Other (See Comments)     " Drowsiness     Propoxyphene Napsylate      Darvocet-N 100     Seasonal Allergies Other (See Comments)     Seasonal allergies.  Per 7/3/07, runny nose, stuffy, plugged ears.       Medications:  Current Outpatient Medications   Medication Sig Dispense Refill     albuterol (PROAIR HFA/PROVENTIL HFA/VENTOLIN HFA) 108 (90 Base) MCG/ACT inhaler INHALE 2 PUFFS INTO THE LUNGS EVERY 4 HOURS AS NEEDED FOR SHORTNESS OF BREATH/DYSPNEA. 8.5 g 0     amphetamine-dextroamphetamine (ADDERALL) 20 MG tablet TAKE 2 TABLETS BY MOUTH IN THE MORNING.  To avoid insomnia, last daily dose should be taken no less than 6 hours before bed.       Calcium Carbonate-Vit D-Min (CALCIUM 1200 PO) Take 1,200 mg by mouth daily        cholecalciferol (VITAMIN D3) 1250 mcg (86668 units) capsule        cyclobenzaprine (FLEXERIL) 5 MG tablet TAKE 1 TABLET (5 MG) BY MOUTH 3 TIMES DAILY AS NEEDED FOR MUSCLE SPASMS 90 tablet 3     diazepam (VALIUM) 2 MG tablet Take 1 tablet (2 mg) by mouth every 12 hours as needed (vertigo) 20 tablet 0     docusate sodium (COLACE) 100 MG capsule Take 100 mg by mouth 3 times daily as needed       hydrochlorothiazide (HYDRODIURIL) 50 MG tablet TAKE 1 TABLET DAILY BY MOUTH AS NEEDED 90 tablet 2     ipratropium - albuterol 0.5 mg/2.5 mg/3 mL (DUONEB) 0.5-2.5 (3) MG/3ML neb solution NEBULIZE CONTENTS OF 1 VIALEVERY 6 HOURS AS NEEDED FORSHORTNESS OF BREATH OR WHEEZING 90 mL 0     Magnesium Oxide 500 MG CAPS        Multiple Vitamins-Minerals (PRESERVISION AREDS 2) CAPS Take 1 capful by mouth daily       ORDER FOR DME Equipment being ordered: knee sleeve 1 Device 6     oxybutynin ER (DITROPAN-XL) 5 MG 24 hr tablet Take 1 tablet (5 mg) by mouth daily 30 tablet 3     polyethylene glycol (MIRALAX) 17 GM/Dose powder Take 17 g (1 capful) by mouth 2 times daily 850 g 3     pramipexole (MIRAPEX) 0.5 MG tablet TAKE 1 TABLET BY MOUTH AT BEDTIME - GENERIC FOR MIRAPEX 90 tablet 3     predniSONE (DELTASONE) 20 MG tablet TAKE 2 TABLETS BY MOUTH  EVERY MORNING FOR 7 DAYS, 1 TABLET EVERY MORNING FOR 7 DAYS, THEN STOP 21 tablet 0     simvastatin (ZOCOR) 10 MG tablet Take 1 tablet (10 mg) by mouth At Bedtime 30 tablet 0     traMADol (ULTRAM) 50 MG tablet TAKE 1 TO 2 TABLETS BY MOUTH EVERY 6 HOURS AS NEEDED FOR MODERATE PAIN 60 tablet 0     zolpidem (AMBIEN) 5 MG tablet TAKE 1 TABLET (5 MG) BY MOUTH NIGHTLY AS NEEDED FORSLEEP 30 tablet 1       Family History:  Family History   Problem Relation Age of Onset     Ovarian Cancer Mother      Depression Mother      Hypertension Mother      Cancer Mother         Cervical     Parkinsonism Mother      Alcohol/Drug Father 42        Motor Vehicle Accident due to Alcoholism - Cause of Death     Other - See Comments Sister         ADD/ADHD     Other - See Comments Sister         Fibromyalgia     Diabetes Sister      Diabetes Sister      Other - See Comments Sister         ADD/ADHD     Other - See Comments Son         ADD/ADHD     Other - See Comments Son         ADD/ADHD     Diabetes Maternal Uncle      Parkinsonism Maternal Uncle      Strong family history of Parkinson's no known early strokes.  Social History:  Social History     Socioeconomic History     Marital status:      Spouse name: Not on file     Number of children: Not on file     Years of education: Not on file     Highest education level: Not on file   Occupational History     Occupation: RN - MENTAL HEALTH     Employer: RETIRED   Tobacco Use     Smoking status: Never Smoker     Smokeless tobacco: Never Used   Substance and Sexual Activity     Alcohol use: Yes     Comment: RARELY     Drug use: No     Sexual activity: Yes     Partners: Male   Other Topics Concern      Service Not Asked     Blood Transfusions Yes     Comment: PERMITS     Caffeine Concern Yes     Comment: COFFEE - 2 CUPS DAILY     Occupational Exposure Not Asked     Hobby Hazards Not Asked     Sleep Concern Not Asked     Stress Concern Not Asked     Weight Concern Not Asked      "Special Diet Not Asked     Back Care Not Asked     Exercise Yes     Comment: 2-3 TIMES WEEKLY     Bike Helmet Not Asked     Seat Belt Not Asked     Self-Exams Not Asked     Parent/sibling w/ CABG, MI or angioplasty before 65F 55M? No   Social History Narrative     Not on file     Social Determinants of Health     Financial Resource Strain: Not on file   Food Insecurity: Not on file   Transportation Needs: Not on file   Physical Activity: Not on file   Stress: Not on file   Social Connections: Not on file   Intimate Partner Violence: Not on file   Housing Stability: Not on file     Alcohol: rare  Tobacco: none  Illicit drugs: none, medical marijuana for a year but stopped  Caffeine: none    Physical Exam:  Vitals: /83 (BP Location: Right arm, Patient Position: Sitting, Cuff Size: Adult Regular)   Pulse 101   Temp 98.6  F (37  C) (Tympanic)   Resp 16   Ht 1.632 m (5' 4.25\")   Wt 70.8 kg (156 lb)   SpO2 98%   BMI 26.57 kg/m     General: Seated comfortably in no acute distress.  HEENT: Neck supple  Heart: Mildly tachycardic, no murmur  Lungs: breathing comfortably, no wheezing or rhonchi heard  GI: Non obese  Extremities: no edema  Skin: No rashes  Neurologic:     Mental Status: Fully alert, attentive and oriented. Speech clear and fluent, no paraphasic errors.     Cranial Nerves:  PERRL. EOMI with no nystagmus and normal smooth pursuit. Facial sensation intact/symmetric. Facial movements symmetric. Hearing not formally tested but intact to conversation. Palate elevation symmetric, uvula midline. No dysarthria. Shoulder shrug strong bilaterally. Tongue protrusion midline.     Motor: No tremors or other abnormal movements observed. Muscle tone normal throughout. Strength 5/5 throughout upper and lower extremities.     Deep Tendon Reflexes: 2+/symmetric throughout upper and lower extremities. No clonus.      Sensory: Intact/symmetric to light touch throughout upper and lower extremities.  No sensory " extinction.  Negative Romberg.     Coordination: Finger-nose-finger and heel-shin intact without dysmetria.      Gait: Normal, steady casual gait.  Did have some difficulty with tandem but was able to complete.    Hints exam with no nystagmus, no skew deviation, and head impulse was accurate.    Pertinent Investigations:  MRI brain: Very mild atrophy noted.  Moderate to advanced supratentorial and pontine chronic microvascular ischemic changes.  On close inspection in the posterior kailash near the level of the midbrain there does appear to be very mild increased DWI signal with ADC correlate.  There is subtle FLAIR change in that region as well.    Assessment:  #CVA  #Prediabetes  #Vertigo  #abnormal brain MRI      Ms. Vicente is a 72-year-old female with past medical history outlined above who presents after an episode of vertigo and abnormal MRI of the brain.  The description of her vertigo is not consistent with BPPV or other conditions like Ménière's or superior canal dehiscence.  Most likely etiologies would be either a small stroke, or possibly vestibular neuritis.  Upon my review of the MRI there is a small area of signal change in the posterior kailash that I think could be consistent with a small stroke especially when not imaged in the acute setting.  That being said the length of time that she had symptoms with a stroke of the size is a bit unusual and would fit better with vestibular neuritis.  I cannot definitively say either way at this time what the etiology of her vertigo was but fortunately it has resolved.  She does have significant small vessel ischemic changes on her brain MRI.  She does have hypertension and is on Adderall which may predispose her to this.  At this point I think it is unknown if fibromyalgia can cause some of these changes as well.  With the question of stroke and these MRI changes would prefer to treat her as stroke and continue her aspirin and statin that were prescribed  previously.  Given the risk of second stroke in the setting of untreated for stroke I think this would be the safer option.  I did inform her that if she were to have similar symptoms in the future that she should go to the ED to be imaged more emergently which may help delineate if this is vascular or vestibular neuritis in the future.    Plan:  - Continue simvastatin and asprin 81mg previously prescribed.  Did give 6-month supply of simvastatin until able to follow-up with primary care  -Recommend discuss with her prescriber of Adderall if there were any other options, as this may predispose her to stroke risk in the setting of white matter changes.  At this time she finds the agent helpful and does not want to discontinue as does not think her quality of life would be sufficient.  She does understand there is a stroke risk in the setting.  -I also discussed nonpharmacologic measures including Mediterranean diet and aerobic exercise  - Follow with eye doctor as planned for vision changes    Follow up in Neurology clinic as needed should new concerns arise.    Radha Villar DO   of Neurology      54 min spent on the date of the encounter in  9 min chart review, 35 min patient visit, review of tests, 10 min documentation and/or discussion with other providers about the issues documented above.   Answers for HPI/ROS submitted by the patient on 7/23/2022  If you checked off any problems, how difficult have these problems made it for you to do your work, take care of things at home, or get along with other people?: Somewhat difficult  PHQ9 TOTAL SCORE: 6

## 2022-07-25 NOTE — PATIENT INSTRUCTIONS
Reason for today's visit: renao    Your diagnosis: probable stroke vs vestibular neuritis    Tests that you will need:   - Hemoglobin A1C    Treatment plan:   - Would continue asprin 81mg and simvastatin  - Talk to your prescriber about adderall  - Mediterranean diet and regular aerobic exercise recommended.            Your test results are reviewed several times a day.  Once they are all in, you will be sent a letter with your results and/or if you are signed up for on-line services, you will be e-mailed the results.  If there are serious findings, you typically will be called.    If you have any questions about your visit, your symptoms, your medication, your test results or it is not clear what your diagnosis or treatment plan is please contact our office at [phone] for general neurology    If you need follow-up in the future, please call [phone] for an appointment.  If you cannot get a time that satisfies you, please let us know what times work for you and we will do our best to accommodate you.    Radha Villar DO  Neurology

## 2022-09-04 ENCOUNTER — HEALTH MAINTENANCE LETTER (OUTPATIENT)
Age: 72
End: 2022-09-04

## 2022-09-30 ENCOUNTER — ALLIED HEALTH/NURSE VISIT (OUTPATIENT)
Dept: FAMILY MEDICINE | Facility: OTHER | Age: 72
End: 2022-09-30
Attending: PHYSICIAN ASSISTANT
Payer: MEDICARE

## 2022-09-30 DIAGNOSIS — Z20.822 COVID-19 RULED OUT: Primary | ICD-10-CM

## 2022-09-30 LAB — SARS-COV-2 RNA RESP QL NAA+PROBE: NEGATIVE

## 2022-09-30 PROCEDURE — G0463 HOSPITAL OUTPT CLINIC VISIT: HCPCS | Mod: 25

## 2022-09-30 PROCEDURE — U0005 INFEC AGEN DETEC AMPLI PROBE: HCPCS | Mod: ZL

## 2022-09-30 NOTE — PROGRESS NOTES
Covid swab completed.  Patient tolerated well.  Sample submitted to [facility] lab.  No concerns from patient at visits end.

## 2022-10-03 ENCOUNTER — THERAPY VISIT (OUTPATIENT)
Dept: SLEEP MEDICINE | Facility: HOSPITAL | Age: 72
End: 2022-10-03
Attending: FAMILY MEDICINE
Payer: MEDICARE

## 2022-10-03 DIAGNOSIS — G47.33 OSA (OBSTRUCTIVE SLEEP APNEA): ICD-10-CM

## 2022-10-03 PROCEDURE — 95810 POLYSOM 6/> YRS 4/> PARAM: CPT

## 2022-10-03 PROCEDURE — 95810 POLYSOM 6/> YRS 4/> PARAM: CPT | Mod: 26 | Performed by: FAMILY MEDICINE

## 2022-10-04 NOTE — PROGRESS NOTES
Patient is here with a complaint of snoring. Patient fell asleep quickly and had an efficiency of 88%. All stages of sleep were noted. Heart rate and SPO2 remained WNL. There was no sleep disordered breathing and only mild to moderate snoring with an RDI of 0.4. Patient tolerated test well.

## 2022-10-05 NOTE — PROGRESS NOTES
Anya Vicente is a 72 year old female who is being evaluated via a billable telephone visit.       What phone number would you like to be contacted at?@ [phone]  Home Phone [phone]   Work Phone [phone]   Mobile [phone]       How would you like to obtain your AVS? 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     A/P:     1.)  History of narcolepsy without cataplexy  - Based on prior PSG and MSLT in 2012, noted for no preceding actigraphy and unclear if urine toxicology done after MSLT.  - Current medication regiment appears to be adderall IR 40-60mg QD, zolpidem 5mg at bedtime PRN.  -No specific concerns with current medication regimen, though we did review other currently FDA for medications for narcolepsy including Xyrem, Xywav, Sunosi, Wakix.  - She currently follows with the [facility] sleep clinic, last visit with Danyell Pardo CNP on June 28, 2022.       2.)  RLS  - Based on medication list including pramipexole 0.5mg at bedtime  - No ferritin values seen for review.  I did place an order for ferritin as a future lab draw.     3.)  Unclear nocturnal behaviors, sounding most consistent with NREM parasomnias.  Overall, these sound quite infrequent and do not seem to be related to infrequent use of zolpidem.    4.)  No sleep-disordered breathing observed on PSG.    SUBJECTIVE:  Anya Vicente is a 72 year old female.    72 year old yo female who is referred for snoring.    Pertinent PMHx of RLS, narcolepsy, insomnia, ADHD, PTSD, tourette's disorder.      I did review prior visits with DINO Freedman on 12/6/2016.  Here noted for follow up of narcolepsy without cataplexy, note of minimal response to modafinil and plan to continue adderall 40mg AM and  "20mg PM.     Reviewed scanned PSG results:    9/20/2012 - PSG with weight 148 lbs, BMI 26.2  Sleep latency 8 minutes, REM sleep latency 252 minutes. AHI 1.3.  No hypoxemia.    9/21/2012 - MSLT with mean sleep latency of 6.9 minutes with no SOREM's on 5 naps.    No documentation of actigraphy    Today-reviewed her sleep study results in detail.  We also reviewed her reports abnormal nocturnal behaviors.  She reports that approximately once every other week she will have an episode that varies when she may sit up and talk in a mumbling nature, making teetering sound, crying and sleep.  She has no dream recall with this.  Seems to happen more likely in the middle or last third of her sleep.    STOP-BANG score of 3, with unknown neck circumference.  [place] score of 8-13.  BMI of Estimated body mass index is 27.46 kg/m  as calculated from the following:    Height as of 4/15/22: 1.6 m (5' 3\").    Weight as of 4/15/22: 70.3 kg (155 lb).      Per questionnaire: \" says I have increased snoring, increased tiredness, up 2-3 times a night to bathroom. He says I wake up talking in sleep or chattering like a chipmunk. I alternate between light sleep and dark sleep.\"     Caffeine use:  No for 3+ per day.  No for within 6 hours of bed.     Tobacco use: No     Sleep pattern:  Sleep pattern questions not completed.  Time to fall asleep: ~? minutes.  Awakenings: 3 times per night, 5-10 minutes to return to sleep.  Napping.  1-2 days per week, 10-20 minutes per nap.     Yes for RLS screen.  Yes for sleep walking.  Yes for dream enactment behavior.  Yes for bruxism.     Yes for morning headaches.  Yes for snoring.  No for observed apnea.  Yes for FHx of HOL[place].     SHx:  , lives with .  Does craft shows in summer.    SLEEP STUDY INTERPRETATION  DIAGNOSTIC POLYSOMNOGRAPHY REPORT        Patient: ISAIAS FRANKS  YOB: 1950  Study Date: 10/3/2022  MRN: [id number]  Referring Provider: Cindy Danielle" "MD  Ordering Provider: Jj Tabor MD     Indications for Polysomnography: The patient is a 72 year old Female who is 5' 3\" and weighs 155.0 lbs. Her BMI is 27.5, [place] sleepiness scale 13 and neck circumference is - cm. Relevant medical history includes LS, narcolepsy, insomnia, ADHD, PTSD, tourette's disorder. A diagnostic polysomnogram was performed to evaluate for sleep apnea and abnormal nocturnal behaviors.     Polysomnogram Data: A full night polysomnogram recorded the standard physiologic parameters including EEG, EOG, EMG, ECG, nasal and oral airflow. Respiratory parameters of chest and abdominal movements were recorded with respiratory inductance plethysmography. Oxygen saturation was recorded by pulse oximetry. Hypopnea scoring rule used: 1B 4%.     Sleep Architecture: Decreased sleep latency, delayed RME latency.  Normal arousal index.  Increased percentage of N2, decreased percentage of N3 and REM.  Supine REM was observed.  The total recording time of the polysomnogram was 498.8 minutes. The total sleep time was 441.5 minutes. Sleep latency was decreased at 3.1 minutes without the use of a sleep aid. REM latency was 140.5 minutes. Arousal index was normal at 8.7 arousals per hour. Sleep efficiency was normal at 88.5%. Wake after sleep onset was 54.0 minutes. The patient spent 3.9% of total sleep time in Stage N1, 78.8% in Stage N2, 7.7% in Stage N3, and 9.6% in REM. Time in REM supine was 10.5 minutes.     Respiration: No significant sleep-disordered breathing (AHI 0.4) without sleep-associated hypoxemia.    Events ? The polysomnogram revealed a presence of - obstructive, - central, and - mixed apneas resulting in an apnea index of - events per hour. There were 3 obstructive hypopneas and - central hypopneas resulting in an obstructive hypopnea index of 0.4 and central hypopnea index of - events per hour. The combined apnea/hypopnea index was 0.4 events per hour (central apnea/hypopnea index " was - events per hour). The REM AHI was 4.2 events per hour. The supine AHI was 1.1 events per hour. The RERA index was - events per hour.  The RDI was 0.4 events per hour.    Snoring - was reported as mild to moderate.    Respiratory rate and pattern - was notable for normal respiratory rate and pattern.    Sustained Sleep Associated Hypoventilation - Carbon dioxide monitoring was not used, however significant hypoventilation was not suggested by oximetry.    Sleep Associated Hypoxemia - (Greater than 5 minutes O2 sat at or below 88%) was not present. Baseline oxygen saturation was 96.5%. Lowest oxygen saturation was 83.0%. Time spent less than or equal to 88% was 0 minutes. Time spent less than or equal to 89% was 0 minutes.     Movement Activity: Nothing of note.  No abnormal nocturnal behaviors observed.    Periodic Limb Activity - There were - PLMs during the entire study. The PLM index was - movements per hour. The PLM Arousal Index was - per hour.    REM EMG Activity - Excessive transient/sustained muscle activity was not present.    Nocturnal Behavior - Abnormal sleep related behaviors were not noted during/arising out of NREM / REM sleep.     Bruxism - None apparent.     Cardiac Summary: Appears NSR  The average pulse rate was 60.4 bpm. The minimum pulse rate was 51.0 bpm while the maximum pulse rate was 93.0 bpm.       Assessment:     Decreased sleep latency, delayed RME latency.  Normal arousal index.  Increased percentage of N2, decreased percentage of N3 and REM.  Supine REM was observed.    No significant sleep-disordered breathing (AHI 0.4) without sleep-associated hypoxemia.    No PLM s and no abnormal nocturnal behaviors observed.        Recommendations:    Advice regarding the risks of drowsy driving.    Suggest optimizing sleep schedule and avoiding sleep deprivation.    Weight management (if BMI > 30).    Pharmacologic therapy should be used for management of restless legs syndrome only if  present and clinically indicated and not based on the presence of periodic limb movements alone.     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     Priority: Medium     Other secondary scoliosis, thoracolumbar region 06/24/2021     Priority: Medium     Mixed stress and urge urinary incontinence 12/26/2018     Priority: Medium     Leg cramps 12/26/2018     Priority: Medium     Vitamin D deficiency disease 12/26/2018     Priority: Medium     Major depressive disorder 01/16/2015     Priority: Medium     Insomnia 08/27/2014     Priority: Medium     Glucose intolerance (impaired glucose tolerance) 11/14/2013     Priority: Medium     Narcolepsy without 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) 07/31/2013     Priority: Medium     Complete rupture of rotator cuff 03/09/2009     Priority: Medium     Glucose intolerance 10/16/2008     Priority: Medium     Overview:   IMO Update 10/11       Esophageal reflux 03/26/2008     Priority: Medium     Pain in joint, ankle and foot 08/21/2007     Priority: Medium     Overview:   IMO Update 10/11       Open bimalleolar fracture 08/16/2007     Priority: Medium     Myalgia and myositis 05/01/2007     Priority: Medium     Overview:   IMO Update 10/11       Other malaise and fatigue 05/01/2007     Priority: Medium     Fibromyalgia 09/09/1984     Priority: Medium       10 point ROS of systems including Constitutional, Eyes, Respiratory, Cardiovascular, Gastroenterology,  Genitourinary, Integumentary, Muscularskeletal, Psychiatric were all negative except for pertinent positives noted in my HPI.    Current Outpatient Medications   Medication Sig Dispense Refill     albuterol (PROAIR HFA/PROVENTIL HFA/VENTOLIN HFA) 108 (90 Base) MCG/ACT inhaler INHALE 2 PUFFS INTO THE LUNGS EVERY 4 HOURS AS NEEDED FOR SHORTNESS OF BREATH/DYSPNEA. 8.5 g 0     amphetamine-dextroamphetamine (ADDERALL) 20 MG tablet TAKE 2 TABLETS BY MOUTH IN THE MORNING.  To avoid insomnia, last daily dose should be taken no less than 6 hours before bed.       cholecalciferol (VITAMIN D3) 1250 mcg (31188 units) capsule        cyclobenzaprine (FLEXERIL) 5 MG tablet TAKE 1 TABLET (5 MG) BY MOUTH 3 TIMES DAILY AS NEEDED FOR MUSCLE SPASMS 90 tablet 3     diazepam (VALIUM) 2 MG tablet Take 1 tablet (2 mg) by mouth every 12 hours as needed (vertigo) 20 tablet 0     hydrochlorothiazide (HYDRODIURIL) 50 MG tablet TAKE 1 TABLET DAILY BY MOUTH AS NEEDED 90 tablet 2     ipratropium - albuterol 0.5 mg/2.5 mg/3 mL (DUONEB) 0.5-2.5 (3) MG/3ML neb solution NEBULIZE CONTENTS OF 1 VIALEVERY 6 HOURS AS NEEDED FORSHORTNESS OF BREATH OR WHEEZING 90 mL 0     Multiple Vitamins-Minerals (PRESERVISION AREDS 2) CAPS Take 1 capful by mouth daily       oxybutynin ER (DITROPAN-XL) 5 MG 24 hr tablet Take 1 tablet (5 mg) by mouth daily 30 tablet 3     polyethylene glycol (MIRALAX) 17 GM/Dose powder Take 17 g (1 capful) by mouth 2 times daily 850 g 3     pramipexole (MIRAPEX) 0.5 MG tablet TAKE 1 TABLET BY MOUTH AT BEDTIME - GENERIC FOR MIRAPEX 90 tablet 3     predniSONE (DELTASONE) 20 MG tablet TAKE 2 TABLETS BY MOUTH EVERY MORNING FOR 7 DAYS, 1 TABLET EVERY MORNING FOR 7 DAYS, THEN STOP 21 tablet 0     simvastatin (ZOCOR) 10 MG tablet Take 1 tablet (10 mg) by mouth At Bedtime 90 tablet 1     traMADol (ULTRAM) 50 MG tablet TAKE 1 TO 2 TABLETS BY MOUTH EVERY 6 HOURS AS NEEDED FOR MODERATE PAIN 60 tablet 0     zolpidem (AMBIEN) 5 MG tablet TAKE 1 TABLET  (5 MG) BY MOUTH NIGHTLY AS NEEDED FORSLEEP 30 tablet 1       OBJECTIVE:  There were no vitals taken for this visit.    Physical Exam:  healthy, alert and no distress  PSYCH: Alert and oriented times 3; coherent speech, normal   rate and volume, able to articulate logical thoughts, able   to abstract reason, no tangential thoughts, no hallucinations   or delusions  His affect is normal  RESP: No cough, no audible wheezing, able to talk in full sentences  Remainder of exam unable to be completed due to telephone visits    ---  This note was written with the assistance of the Dragon voice-dictation technology software. The final document, although reviewed, may contain errors. For corrections, please contact the office.    Jj Tabor MD    Sleep Medicine    [place], [place]  o Main Office: [phone]    [facility] [place], [place]  o [address] [facility], [address]  o Schedule visits: [phone]  o Main Office: [phone]  o Fax: [phone]    Time spent on the date of service:  35 minutes.

## 2022-10-06 ENCOUNTER — VIRTUAL VISIT (OUTPATIENT)
Dept: PULMONOLOGY | Facility: OTHER | Age: 72
End: 2022-10-06
Attending: FAMILY MEDICINE
Payer: COMMERCIAL

## 2022-10-06 VITALS — WEIGHT: 158 LBS | HEIGHT: 64 IN | BODY MASS INDEX: 26.98 KG/M2

## 2022-10-06 DIAGNOSIS — E83.10 DISORDER OF IRON METABOLISM: ICD-10-CM

## 2022-10-06 DIAGNOSIS — G25.81 RESTLESS LEGS SYNDROME (RLS): Primary | ICD-10-CM

## 2022-10-06 PROCEDURE — 99203 OFFICE O/P NEW LOW 30 MIN: CPT | Mod: TEL | Performed by: FAMILY MEDICINE

## 2022-10-06 ASSESSMENT — SLEEP AND FATIGUE QUESTIONNAIRES
HOW LIKELY ARE YOU TO NOD OFF OR FALL ASLEEP WHILE SITTING AND TALKING TO SOMEONE: SLIGHT CHANCE OF DOZING
HOW LIKELY ARE YOU TO NOD OFF OR FALL ASLEEP WHILE SITTING QUIETLY AFTER LUNCH WITHOUT ALCOHOL: MODERATE CHANCE OF DOZING
HOW LIKELY ARE YOU TO NOD OFF OR FALL ASLEEP WHEN YOU ARE A PASSENGER IN A CAR FOR AN HOUR WITHOUT A BREAK: HIGH CHANCE OF DOZING
HOW LIKELY ARE YOU TO NOD OFF OR FALL ASLEEP WHILE SITTING INACTIVE IN A PUBLIC PLACE: SLIGHT CHANCE OF DOZING
HOW LIKELY ARE YOU TO NOD OFF OR FALL ASLEEP IN A CAR, WHILE STOPPED FOR A FEW MINUTES IN TRAFFIC: SLIGHT CHANCE OF DOZING
HOW LIKELY ARE YOU TO NOD OFF OR FALL ASLEEP WHILE SITTING AND READING: HIGH CHANCE OF DOZING
HOW LIKELY ARE YOU TO NOD OFF OR FALL ASLEEP WHILE WATCHING TV: HIGH CHANCE OF DOZING
HOW LIKELY ARE YOU TO NOD OFF OR FALL ASLEEP WHILE LYING DOWN TO REST IN THE AFTERNOON WHEN CIRCUMSTANCES PERMIT: HIGH CHANCE OF DOZING

## 2022-10-06 NOTE — PROCEDURES
"-   SLEEP STUDY INTERPRETATION  DIAGNOSTIC POLYSOMNOGRAPHY REPORT      Patient: ISAAIS FRANKS  YOB: 1950  Study Date: 10/3/2022  MRN: [id number]  Referring Provider: Cindy Danielle MD  Ordering Provider: Jj Tabor MD    Indications for Polysomnography: The patient is a 72 year old Female who is 5' 3\" and weighs 155.0 lbs. Her BMI is 27.5, [place] sleepiness scale 13 and neck circumference is - cm. Relevant medical history includes LS, narcolepsy, insomnia, ADHD, PTSD, tourette's disorder. A diagnostic polysomnogram was performed to evaluate for sleep apnea and abnormal nocturnal behaviors.    Polysomnogram Data: A full night polysomnogram recorded the standard physiologic parameters including EEG, EOG, EMG, ECG, nasal and oral airflow. Respiratory parameters of chest and abdominal movements were recorded with respiratory inductance plethysmography. Oxygen saturation was recorded by pulse oximetry. Hypopnea scoring rule used: 1B 4%.    Sleep Architecture: Decreased sleep latency, delayed RME latency.  Normal arousal index.  Increased percentage of N2, decreased percentage of N3 and REM.  Supine REM was observed.  The total recording time of the polysomnogram was 498.8 minutes. The total sleep time was 441.5 minutes. Sleep latency was decreased at 3.1 minutes without the use of a sleep aid. REM latency was 140.5 minutes. Arousal index was normal at 8.7 arousals per hour. Sleep efficiency was normal at 88.5%. Wake after sleep onset was 54.0 minutes. The patient spent 3.9% of total sleep time in Stage N1, 78.8% in Stage N2, 7.7% in Stage N3, and 9.6% in REM. Time in REM supine was 10.5 minutes.    Respiration: No significant sleep-disordered breathing (AHI 0.4) without sleep-associated hypoxemia.    Events ? The polysomnogram revealed a presence of - obstructive, - central, and - mixed apneas resulting in an apnea index of - events per hour. There were 3 obstructive hypopneas and - " central hypopneas resulting in an obstructive hypopnea index of 0.4 and central hypopnea index of - events per hour. The combined apnea/hypopnea index was 0.4 events per hour (central apnea/hypopnea index was - events per hour). The REM AHI was 4.2 events per hour. The supine AHI was 1.1 events per hour. The RERA index was - events per hour.  The RDI was 0.4 events per hour.    Snoring - was reported as mild to moderate.    Respiratory rate and pattern - was notable for normal respiratory rate and pattern.    Sustained Sleep Associated Hypoventilation - Carbon dioxide monitoring was not used, however significant hypoventilation was not suggested by oximetry.    Sleep Associated Hypoxemia - (Greater than 5 minutes O2 sat at or below 88%) was not present. Baseline oxygen saturation was 96.5%. Lowest oxygen saturation was 83.0%. Time spent less than or equal to 88% was 0 minutes. Time spent less than or equal to 89% was 0 minutes.    Movement Activity: Nothing of note.  No abnormal nocturnal behaviors observed.    Periodic Limb Activity - There were - PLMs during the entire study. The PLM index was - movements per hour. The PLM Arousal Index was - per hour.    REM EMG Activity - Excessive transient/sustained muscle activity was not present.    Nocturnal Behavior - Abnormal sleep related behaviors were not noted during/arising out of NREM / REM sleep.     Bruxism - None apparent.    Cardiac Summary: Appears NSR  The average pulse rate was 60.4 bpm. The minimum pulse rate was 51.0 bpm while the maximum pulse rate was 93.0 bpm.      Assessment:     Decreased sleep latency, delayed RME latency.  Normal arousal index.  Increased percentage of N2, decreased percentage of N3 and REM.  Supine REM was observed.    No significant sleep-disordered breathing (AHI 0.4) without sleep-associated hypoxemia.    No PLM s and no abnormal nocturnal behaviors observed.      Recommendations:    Advice regarding the risks of drowsy  driving.    Suggest optimizing sleep schedule and avoiding sleep deprivation.    Weight management (if BMI > 30).    Pharmacologic therapy should be used for management of restless legs syndrome only if present and clinically indicated and not based on the presence of periodic limb movements alone.    Diagnostic Codes:   Unspecified Sleep Disturbance G47.9     _____________________________________   Electronically Signed By: Jj Tabor MD (10/6/2022)

## 2022-10-06 NOTE — PROGRESS NOTES
Chantel is a 72 year old who is being evaluated via a billable telephone visit.      What phone number would you like to be contacted at? ***[phone]  How would you like to obtain your AVS? Asad Carmen    Phone call duration: *** minutes

## 2022-10-11 LAB — SLPCOMP: NORMAL

## 2022-10-22 DIAGNOSIS — R35.1 NOCTURIA: ICD-10-CM

## 2022-10-25 RX ORDER — OXYBUTYNIN CHLORIDE 5 MG/1
5 TABLET, EXTENDED RELEASE ORAL DAILY
Qty: 30 TABLET | Refills: 3 | Status: SHIPPED | OUTPATIENT
Start: 2022-10-25 | End: 2023-02-17

## 2022-10-25 NOTE — TELEPHONE ENCOUNTER
DITROPAN XL 5MG      Last Written Prescription Date:  4-  Last Fill Quantity: 30,   # refills: 3  Last Office Visit: 4-  Future Office visit:       Routing refill request to provider for review/approval because:  Drug not on the [facility], P or [facility] refill protocol or controlled substance  
HTN (hypertension)    Migraine

## 2022-12-23 DIAGNOSIS — I63.81 CEREBROVASCULAR ACCIDENT (CVA) DUE TO OCCLUSION OF SMALL ARTERY (H): ICD-10-CM

## 2022-12-27 RX ORDER — SIMVASTATIN 10 MG
TABLET ORAL
Qty: 90 TABLET | Refills: 1 | Status: SHIPPED | OUTPATIENT
Start: 2022-12-27 | End: 2023-05-08

## 2023-01-12 ENCOUNTER — LAB (OUTPATIENT)
Dept: LAB | Facility: OTHER | Age: 73
End: 2023-01-12
Payer: MEDICARE

## 2023-01-12 DIAGNOSIS — E83.10 DISORDER OF IRON METABOLISM: ICD-10-CM

## 2023-01-12 DIAGNOSIS — G25.81 RESTLESS LEGS SYNDROME (RLS): ICD-10-CM

## 2023-01-12 LAB — FERRITIN SERPL-MCNC: 137 NG/ML (ref 11–328)

## 2023-01-12 PROCEDURE — 82728 ASSAY OF FERRITIN: CPT | Mod: ZL

## 2023-01-12 PROCEDURE — 36415 COLL VENOUS BLD VENIPUNCTURE: CPT | Mod: ZL

## 2023-01-13 NOTE — RESULT ENCOUNTER NOTE
Julian Golden,    The ferritin (estimate of body iron stores) returned in the normal range and also above a value of 60, so we would not recommend additional iron supplementation at this time.    Jj Tabor MD    Sleep Medicine  [place], [place]  Main Office: [phone]  [facility] [address], [address]  Schedule visits: [phone]  Main Office: [phone]  Fax: [phone]

## 2023-02-15 DIAGNOSIS — R35.1 NOCTURIA: ICD-10-CM

## 2023-02-15 NOTE — TELEPHONE ENCOUNTER
oxybutynin ER (DITROPAN XL) 5 MG 24 hr tablet      Last Written Prescription Date:  10/25/2022  Last Fill Quantity: 30,   # refills: 3  Last Office Visit: 4/15/2022

## 2023-02-17 RX ORDER — OXYBUTYNIN CHLORIDE 5 MG/1
5 TABLET, EXTENDED RELEASE ORAL DAILY
Qty: 30 TABLET | Refills: 3 | Status: SHIPPED | OUTPATIENT
Start: 2023-02-17 | End: 2023-05-08

## 2023-03-03 NOTE — TELEPHONE ENCOUNTER
adderall      Last Written Prescription Date:  Patient reported  Last Fill Quantity: 0,   # refills: 0  Last Office Visit: 10/6/22  Future Office visit:       Routing refill request to provider for review/approval because:  Drug not on the [facility], [facility] or [facility] refill protocol or controlled substance  Medication is reported/historical

## 2023-03-06 RX ORDER — DEXTROAMPHETAMINE SACCHARATE, AMPHETAMINE ASPARTATE, DEXTROAMPHETAMINE SULFATE AND AMPHETAMINE SULFATE 5; 5; 5; 5 MG/1; MG/1; MG/1; MG/1
TABLET ORAL
OUTPATIENT
Start: 2023-03-06

## 2023-04-24 DIAGNOSIS — G25.81 RESTLESS LEGS SYNDROME: ICD-10-CM

## 2023-04-26 RX ORDER — PRAMIPEXOLE DIHYDROCHLORIDE 0.5 MG/1
TABLET ORAL
Qty: 90 TABLET | Refills: 0 | Status: SHIPPED | OUTPATIENT
Start: 2023-04-26 | End: 2023-05-08

## 2023-04-26 NOTE — TELEPHONE ENCOUNTER
pramipexole (MIRAPEX) 0.5 MG tablet   Last Written Prescription Date:  10-  Last Fill Quantity: 90,   # refills: 3  Last Office Visit: 4-15-22  Future Office visit:       Routing refill request to provider for review/approval because:  Drug not on the [facility], [facility] or [facility] refill protocol or controlled substance

## 2023-04-29 ENCOUNTER — HEALTH MAINTENANCE LETTER (OUTPATIENT)
Age: 73
End: 2023-04-29

## 2023-05-07 ASSESSMENT — ANXIETY QUESTIONNAIRES
7. FEELING AFRAID AS IF SOMETHING AWFUL MIGHT HAPPEN: SEVERAL DAYS
8. IF YOU CHECKED OFF ANY PROBLEMS, HOW DIFFICULT HAVE THESE MADE IT FOR YOU TO DO YOUR WORK, TAKE CARE OF THINGS AT HOME, OR GET ALONG WITH OTHER PEOPLE?: SOMEWHAT DIFFICULT
5. BEING SO RESTLESS THAT IT IS HARD TO SIT STILL: SEVERAL DAYS
GAD7 TOTAL SCORE: 11
7. FEELING AFRAID AS IF SOMETHING AWFUL MIGHT HAPPEN: SEVERAL DAYS
2. NOT BEING ABLE TO STOP OR CONTROL WORRYING: NEARLY EVERY DAY
GAD7 TOTAL SCORE: 11
6. BECOMING EASILY ANNOYED OR IRRITABLE: SEVERAL DAYS
4. TROUBLE RELAXING: NEARLY EVERY DAY
3. WORRYING TOO MUCH ABOUT DIFFERENT THINGS: SEVERAL DAYS
1. FEELING NERVOUS, ANXIOUS, OR ON EDGE: SEVERAL DAYS
GAD7 TOTAL SCORE: 11
IF YOU CHECKED OFF ANY PROBLEMS ON THIS QUESTIONNAIRE, HOW DIFFICULT HAVE THESE PROBLEMS MADE IT FOR YOU TO DO YOUR WORK, TAKE CARE OF THINGS AT HOME, OR GET ALONG WITH OTHER PEOPLE: SOMEWHAT DIFFICULT

## 2023-05-07 ASSESSMENT — PATIENT HEALTH QUESTIONNAIRE - PHQ9
10. IF YOU CHECKED OFF ANY PROBLEMS, HOW DIFFICULT HAVE THESE PROBLEMS MADE IT FOR YOU TO DO YOUR WORK, TAKE CARE OF THINGS AT HOME, OR GET ALONG WITH OTHER PEOPLE: VERY DIFFICULT
SUM OF ALL RESPONSES TO PHQ QUESTIONS 1-9: 17
SUM OF ALL RESPONSES TO PHQ QUESTIONS 1-9: 17

## 2023-05-08 ENCOUNTER — OFFICE VISIT (OUTPATIENT)
Dept: FAMILY MEDICINE | Facility: OTHER | Age: 73
End: 2023-05-08
Attending: PHYSICIAN ASSISTANT
Payer: COMMERCIAL

## 2023-05-08 VITALS
BODY MASS INDEX: 27.55 KG/M2 | HEART RATE: 82 BPM | TEMPERATURE: 97.6 F | OXYGEN SATURATION: 99 % | RESPIRATION RATE: 16 BRPM | DIASTOLIC BLOOD PRESSURE: 48 MMHG | SYSTOLIC BLOOD PRESSURE: 105 MMHG | WEIGHT: 158 LBS

## 2023-05-08 DIAGNOSIS — G25.81 RESTLESS LEGS SYNDROME: ICD-10-CM

## 2023-05-08 DIAGNOSIS — R35.1 NOCTURIA: ICD-10-CM

## 2023-05-08 DIAGNOSIS — J98.01 BRONCHOSPASM: ICD-10-CM

## 2023-05-08 DIAGNOSIS — G25.81 RESTLESS LEGS SYNDROME (RLS): ICD-10-CM

## 2023-05-08 DIAGNOSIS — I63.81 CEREBROVASCULAR ACCIDENT (CVA) DUE TO OCCLUSION OF SMALL ARTERY (H): ICD-10-CM

## 2023-05-08 DIAGNOSIS — M79.7 FIBROMYALGIA: ICD-10-CM

## 2023-05-08 DIAGNOSIS — N76.0 VAGINITIS AND VULVOVAGINITIS: ICD-10-CM

## 2023-05-08 DIAGNOSIS — F33.1 MODERATE EPISODE OF RECURRENT MAJOR DEPRESSIVE DISORDER (H): ICD-10-CM

## 2023-05-08 DIAGNOSIS — F43.21 GRIEF: Primary | ICD-10-CM

## 2023-05-08 PROCEDURE — G0463 HOSPITAL OUTPT CLINIC VISIT: HCPCS

## 2023-05-08 PROCEDURE — 99214 OFFICE O/P EST MOD 30 MIN: CPT | Performed by: PHYSICIAN ASSISTANT

## 2023-05-08 RX ORDER — SIMVASTATIN 10 MG
10 TABLET ORAL AT BEDTIME
Qty: 90 TABLET | Refills: 1 | Status: SHIPPED | OUTPATIENT
Start: 2023-05-08 | End: 2023-06-21

## 2023-05-08 RX ORDER — DIAZEPAM 2 MG
2 TABLET ORAL EVERY 12 HOURS PRN
Qty: 20 TABLET | Refills: 0 | Status: SHIPPED | OUTPATIENT
Start: 2023-05-08 | End: 2023-06-21

## 2023-05-08 RX ORDER — PRAMIPEXOLE DIHYDROCHLORIDE 0.5 MG/1
TABLET ORAL
Qty: 90 TABLET | Refills: 0 | Status: SHIPPED | OUTPATIENT
Start: 2023-05-08 | End: 2023-06-21

## 2023-05-08 RX ORDER — OXYBUTYNIN CHLORIDE 5 MG/1
5 TABLET, EXTENDED RELEASE ORAL DAILY
Qty: 30 TABLET | Refills: 3 | Status: SHIPPED | OUTPATIENT
Start: 2023-05-08 | End: 2023-06-21

## 2023-05-08 RX ORDER — ZOLPIDEM TARTRATE 5 MG/1
TABLET ORAL
Qty: 30 TABLET | Refills: 1 | Status: SHIPPED | OUTPATIENT
Start: 2023-05-08 | End: 2023-06-21

## 2023-05-08 RX ORDER — CLINDAMYCIN PHOSPHATE 20 MG/G
1 CREAM VAGINAL AT BEDTIME
Qty: 80 G | Refills: 0 | Status: SHIPPED | OUTPATIENT
Start: 2023-05-08 | End: 2023-06-21

## 2023-05-08 RX ORDER — CYCLOBENZAPRINE HCL 5 MG
5 TABLET ORAL 3 TIMES DAILY PRN
Qty: 90 TABLET | Refills: 3 | Status: SHIPPED | OUTPATIENT
Start: 2023-05-08 | End: 2023-06-21

## 2023-05-08 RX ORDER — TRAMADOL HYDROCHLORIDE 50 MG/1
TABLET ORAL
Qty: 60 TABLET | Refills: 0 | Status: SHIPPED | OUTPATIENT
Start: 2023-05-08 | End: 2023-06-21

## 2023-05-08 ASSESSMENT — ASTHMA QUESTIONNAIRES
QUESTION_2 LAST FOUR WEEKS HOW OFTEN HAVE YOU HAD SHORTNESS OF BREATH: ONCE OR TWICE A WEEK
QUESTION_5 LAST FOUR WEEKS HOW WOULD YOU RATE YOUR ASTHMA CONTROL: SOMEWHAT CONTROLLED
ACT_TOTALSCORE: 14
ACUTE_EXACERBATION_TODAY: NO
QUESTION_4 LAST FOUR WEEKS HOW OFTEN HAVE YOU USED YOUR RESCUE INHALER OR NEBULIZER MEDICATION (SUCH AS ALBUTEROL): ONE OR TWO TIMES PER DAY
QUESTION_3 LAST FOUR WEEKS HOW OFTEN DID YOUR ASTHMA SYMPTOMS (WHEEZING, COUGHING, SHORTNESS OF BREATH, CHEST TIGHTNESS OR PAIN) WAKE YOU UP AT NIGHT OR EARLIER THAN USUAL IN THE MORNING: TWO OR THREE NIGHTS A WEEK
ACT_TOTALSCORE: 14
QUESTION_1 LAST FOUR WEEKS HOW MUCH OF THE TIME DID YOUR ASTHMA KEEP YOU FROM GETTING AS MUCH DONE AT WORK, SCHOOL OR AT HOME: SOME OF THE TIME

## 2023-05-08 ASSESSMENT — PAIN SCALES - GENERAL: PAINLEVEL: MODERATE PAIN (4)

## 2023-05-08 NOTE — PROGRESS NOTES
Answers for HPI/ROS submitted by the patient on 5/7/2023  If you checked off any problems, how difficult have these problems made it for you to do your work, take care of things at home, or get along with other people?: Very difficult  PHQ9 TOTAL SCORE: 17  VALERIA 7 TOTAL SCORE: 11      Assessment & Plan     Bronchospasm  Using duo neb hasn't needed them until recently. Having some heart palpitations.     Fibromyalgia  Needing refill on her medications , stress is high .    - cyclobenzaprine (FLEXERIL) 5 MG tablet; Take 1 tablet (5 mg) by mouth 3 times daily as needed for muscle spasms  - traMADol (ULTRAM) 50 MG tablet; TAKE 1 TO 2 TABLETS BY MOUTH EVERY 6 HOURS AS NEEDED FOR MODERATE PAIN    Nocturia  Helps her with her bladder urgency.   We will refill and get labs.   - oxybutynin ER (DITROPAN XL) 5 MG 24 hr tablet; Take 1 tablet (5 mg) by mouth daily    Restless legs syndrome  Refill is given. Helps her sleep.   - pramipexole (MIRAPEX) 0.5 MG tablet; TAKE 1 TABLET BY MOUTH EVERY EVENING    Cerebrovascular accident (CVA) due to occlusion of small artery (H)  She is cecily tto have labs with her physical.   - simvastatin (ZOCOR) 10 MG tablet; Take 1 tablet (10 mg) by mouth At Bedtime    Restless legs syndrome (RLS)  As above.  - diazepam (VALIUM) 2 MG tablet; Take 1 tablet (2 mg) by mouth every 12 hours as needed (vertigo)  - zolpidem (AMBIEN) 5 MG tablet; TAKE 1 TABLET (5 MG) BY MOUTH NIGHTLY AS NEEDED FORSLEEP    Moderate episode of recurrent major depressive disorder (H)  Hard for her but doesn't want to go back on her medications.      Grief  She is in a severe grief reaction. Loss of her spouse. Looking for support group and so we will have her see Colin Trejo..   - Adult Mental Health  Referral; Future    Review of external notes as documented elsewhere in note  Ordering of each unique test  Prescription drug management  10  minutes spent by me on the date of the encounter doing chart review,  "history and exam, documentation and further activities per the note       BMI:   Estimated body mass index is 27.55 kg/m  as calculated from the following:    Height as of 10/6/22: 1.613 m (5' 3.5\").    Weight as of this encounter: 71.7 kg (158 lb).       See Patient Instructions    No follow-ups on file.    RADHAMES Bowman  [facility] - MAHOGANY Golden is a 72 year old, presenting for the following health issues:  Vaginal Problem (itching), Anxiety, and Musculoskeletal Problem (Neck and shoulder pain)        5/8/2023     1:49 PM   Additional Questions   Roomed by Jeffrey Martinez LPN   Accompanied by self         5/8/2023     1:49 PM   Patient Reported Additional Medications   Patient reports taking the following new medications none     HPI     Anxiety Follow-Up    How are you doing with your anxiety since your last visit? Worsened     Are you having other symptoms that might be associated with anxiety? Yes:  crying and more anxiety    Have you had a significant life event? Grief or Loss     Are you feeling depressed? Yes:  due to loss    Do you have any concerns with your use of alcohol or other drugs? No    Social History     Tobacco Use     Smoking status: Never     Smokeless tobacco: Never   Vaping Use     Vaping status: Never Used   Substance Use Topics     Alcohol use: Yes     Comment: RARELY     Drug use: No         6/24/2021     1:00 PM 4/15/2022    10:00 AM 5/7/2023     5:40 PM   VALERIA-7 SCORE   Total Score   11 (moderate anxiety)   Total Score 3 0 11         4/15/2022    10:00 AM 7/23/2022    11:33 PM 5/7/2023     5:39 PM   PHQ   PHQ-9 Total Score 0 6 17   Q9: Thoughts of better off dead/self-harm past 2 weeks Not at all Not at all Not at all         5/7/2023     5:39 PM   Last PHQ-9   1.  Little interest or pleasure in doing things 1   2.  Feeling down, depressed, or hopeless 3   3.  Trouble falling or staying asleep, or sleeping too much 3   4.  Feeling tired or having " little energy 2   5.  Poor appetite or overeating 2   6.  Feeling bad about yourself 1   7.  Trouble concentrating 3   8.  Moving slowly or restless 2   Q9: Thoughts of better off dead/self-harm past 2 weeks 0   PHQ-9 Total Score 17         5/7/2023     5:40 PM   VALERIA-7    1. Feeling nervous, anxious, or on edge 1   2. Not being able to stop or control worrying 3   3. Worrying too much about different things 1   4. Trouble relaxing 3   5. Being so restless that it is hard to sit still 1   6. Becoming easily annoyed or irritable 1   7. Feeling afraid, as if something awful might happen 1   VALERIA-7 Total Score 11   If you checked any problems, how difficult have they made it for you to do your work, take care of things at home, or get along with other people? Somewhat difficult       Pain History:  When did you first notice your pain?  Couple weeks ago  Have you seen this provider for your pain in the past? No   Where in your body do your have pain? Neck and left shoulder  Are you seeing anyone else for your pain? No  What makes your pain better? Hot and cold   What makes your pain worse? use  How has pain affected your ability to work? Not applicable  Who lives in your household? Self and 1 dog        4/15/2022    10:00 AM 7/23/2022    11:33 PM 5/7/2023     5:39 PM   PHQ-9 SCORE   PHQ-9 Total Score MyChart  6 (Mild depression) 17 (Moderately severe depression)   PHQ-9 Total Score 0 6 17           6/24/2021     1:00 PM 4/15/2022    10:00 AM 5/7/2023     5:40 PM   VALERIA-7 SCORE   Total Score   11 (moderate anxiety)   Total Score 3 0 11               4/15/2022    10:00 AM 7/23/2022    11:33 PM 5/7/2023     5:39 PM   PHQ-9 SCORE   PHQ-9 Total Score MyChart  6 (Mild depression) 17 (Moderately severe depression)   PHQ-9 Total Score 0 6 17         6/24/2021     1:00 PM 4/15/2022    10:00 AM 5/7/2023     5:40 PM   VALERIA-7 SCORE   Total Score   11 (moderate anxiety)   Total Score 3 0 11         5/8/2023     2:04 PM   PEG Score   PEG  Total Score 6.67       Chronic Pain - Initial Assessment:    How would you describe your pain? Aching, pulling, feels like someone is stabbing with little needles in the back of the head  Have you had any recent changes to the severity or character of your pain? none  Is there an underlying cause that has been identified? unknown  Has your ability to work or do daily activities changed recently because of your pain? n/a  Which of these pain treatments have you tried? Activity or exercise, Chiropractor, Cold, Heat, Rest and Stretching  Previous medication treatments:  Other - acetaminophen (Tylenol)              Concern - Vaginal itching  Onset: a few months  Description: vaginal itching  Intensity: severe  Progression of Symptoms:  worsening and intermittent  Accompanying Signs & Symptoms: a rash  Previous history of similar problem: yes   Precipitating factors:        Worsened by: might be toilet paper  Alleviating factors:        Improved by: cream from last time  Therapies tried and outcome: a cream worked well          Review of Systems   Constitutional, HEENT, cardiovascular, pulmonary, gi and gu systems are negative, except as otherwise noted.      Objective    /48 (BP Location: Left arm, Patient Position: Sitting, Cuff Size: Adult Regular)   Pulse 82   Temp 97.6  F (36.4  C) (Tympanic)   Resp 16   Wt 71.7 kg (158 lb)   SpO2 99%   BMI 27.55 kg/m    Body mass index is 27.55 kg/m .  Physical Exam   GENERAL: healthy, alert and no distress  EYES: Eyes grossly normal to inspection, PERRL and conjunctivae and sclerae normal  HENT: ear canals and TM's normal, nose and mouth without ulcers or lesions  NECK: no adenopathy, no asymmetry, masses, or scars and thyroid normal to palpation  RESP: lungs clear to auscultation - no rales, rhonchi or wheezes  CV: regular rate and rhythm, normal S1 S2, no S3 or S4, no murmur, click or rub, no peripheral edema and peripheral pulses strong  ABDOMEN: soft, nontender, no  hepatosplenomegaly, no masses and bowel sounds normal  MS: no gross musculoskeletal defects noted, no edema  Psyche: emotional and grief stricken.   Lab on 01/12/2023   Component Date Value Ref Range Status     Ferritin 01/12/2023 137  11 - 328 ng/mL Final

## 2023-05-09 ENCOUNTER — TELEPHONE (OUTPATIENT)
Dept: PSYCHOLOGY | Facility: OTHER | Age: 73
End: 2023-05-09

## 2023-06-21 ENCOUNTER — LAB (OUTPATIENT)
Dept: LAB | Facility: OTHER | Age: 73
End: 2023-06-21
Attending: PHYSICIAN ASSISTANT
Payer: COMMERCIAL

## 2023-06-21 ENCOUNTER — OFFICE VISIT (OUTPATIENT)
Dept: FAMILY MEDICINE | Facility: OTHER | Age: 73
End: 2023-06-21
Attending: PHYSICIAN ASSISTANT
Payer: MEDICARE

## 2023-06-21 VITALS
BODY MASS INDEX: 25.81 KG/M2 | OXYGEN SATURATION: 98 % | TEMPERATURE: 96.6 F | RESPIRATION RATE: 16 BRPM | DIASTOLIC BLOOD PRESSURE: 72 MMHG | HEART RATE: 74 BPM | WEIGHT: 148 LBS | SYSTOLIC BLOOD PRESSURE: 120 MMHG

## 2023-06-21 DIAGNOSIS — I63.81 CEREBROVASCULAR ACCIDENT (CVA) DUE TO OCCLUSION OF SMALL ARTERY (H): ICD-10-CM

## 2023-06-21 DIAGNOSIS — M79.7 FIBROMYALGIA: ICD-10-CM

## 2023-06-21 DIAGNOSIS — Z00.00 ENCOUNTER FOR MEDICARE ANNUAL WELLNESS EXAM: Primary | ICD-10-CM

## 2023-06-21 DIAGNOSIS — N76.0 VAGINITIS AND VULVOVAGINITIS: ICD-10-CM

## 2023-06-21 DIAGNOSIS — R73.09 ELEVATED GLUCOSE: ICD-10-CM

## 2023-06-21 DIAGNOSIS — G25.81 RESTLESS LEGS SYNDROME (RLS): ICD-10-CM

## 2023-06-21 DIAGNOSIS — R35.1 NOCTURIA: ICD-10-CM

## 2023-06-21 DIAGNOSIS — J98.01 BRONCHOSPASM: ICD-10-CM

## 2023-06-21 DIAGNOSIS — I10 ESSENTIAL HYPERTENSION: ICD-10-CM

## 2023-06-21 DIAGNOSIS — G25.81 RESTLESS LEGS SYNDROME: ICD-10-CM

## 2023-06-21 DIAGNOSIS — H81.13 BENIGN PAROXYSMAL POSITIONAL VERTIGO DUE TO BILATERAL VESTIBULAR DISORDER: ICD-10-CM

## 2023-06-21 DIAGNOSIS — M54.2 NECK PAIN: ICD-10-CM

## 2023-06-21 DIAGNOSIS — G47.411 NARCOLEPSY AND CATAPLEXY: ICD-10-CM

## 2023-06-21 DIAGNOSIS — J45.901 MILD ASTHMA WITH ACUTE EXACERBATION, UNSPECIFIED WHETHER PERSISTENT: ICD-10-CM

## 2023-06-21 LAB
ALBUMIN SERPL BCG-MCNC: 3.8 G/DL (ref 3.5–5.2)
ALBUMIN UR-MCNC: NEGATIVE MG/DL
ALP SERPL-CCNC: 79 U/L (ref 35–104)
ALT SERPL W P-5'-P-CCNC: 18 U/L (ref 0–50)
ANION GAP SERPL CALCULATED.3IONS-SCNC: 10 MMOL/L (ref 7–15)
APPEARANCE UR: CLEAR
AST SERPL W P-5'-P-CCNC: 19 U/L (ref 0–45)
BACTERIA #/AREA URNS HPF: ABNORMAL /HPF
BASOPHILS # BLD AUTO: 0.1 10E3/UL (ref 0–0.2)
BASOPHILS NFR BLD AUTO: 1 %
BILIRUB SERPL-MCNC: 0.5 MG/DL
BILIRUB UR QL STRIP: NEGATIVE
BUN SERPL-MCNC: 17.7 MG/DL (ref 8–23)
CALCIUM SERPL-MCNC: 9.3 MG/DL (ref 8.8–10.2)
CHLORIDE SERPL-SCNC: 102 MMOL/L (ref 98–107)
CHOLEST SERPL-MCNC: 137 MG/DL
COLOR UR AUTO: ABNORMAL
CREAT SERPL-MCNC: 0.81 MG/DL (ref 0.51–0.95)
DEPRECATED HCO3 PLAS-SCNC: 25 MMOL/L (ref 22–29)
EOSINOPHIL # BLD AUTO: 0.2 10E3/UL (ref 0–0.7)
EOSINOPHIL NFR BLD AUTO: 3 %
ERYTHROCYTE [DISTWIDTH] IN BLOOD BY AUTOMATED COUNT: 12.6 % (ref 10–15)
GFR SERPL CREATININE-BSD FRML MDRD: 75 ML/MIN/1.73M2
GLUCOSE SERPL-MCNC: 112 MG/DL (ref 70–99)
GLUCOSE UR STRIP-MCNC: NEGATIVE MG/DL
HCT VFR BLD AUTO: 37.7 % (ref 35–47)
HDLC SERPL-MCNC: 69 MG/DL
HGB BLD-MCNC: 12.9 G/DL (ref 11.7–15.7)
HGB UR QL STRIP: NEGATIVE
HYALINE CASTS: 1 /LPF
IMM GRANULOCYTES # BLD: 0 10E3/UL
IMM GRANULOCYTES NFR BLD: 0 %
KETONES UR STRIP-MCNC: NEGATIVE MG/DL
LDLC SERPL CALC-MCNC: 54 MG/DL
LEUKOCYTE ESTERASE UR QL STRIP: ABNORMAL
LYMPHOCYTES # BLD AUTO: 2.2 10E3/UL (ref 0.8–5.3)
LYMPHOCYTES NFR BLD AUTO: 36 %
MCH RBC QN AUTO: 31.6 PG (ref 26.5–33)
MCHC RBC AUTO-ENTMCNC: 34.2 G/DL (ref 31.5–36.5)
MCV RBC AUTO: 92 FL (ref 78–100)
MONOCYTES # BLD AUTO: 0.5 10E3/UL (ref 0–1.3)
MONOCYTES NFR BLD AUTO: 9 %
MUCOUS THREADS #/AREA URNS LPF: PRESENT /LPF
NEUTROPHILS # BLD AUTO: 3.1 10E3/UL (ref 1.6–8.3)
NEUTROPHILS NFR BLD AUTO: 51 %
NITRATE UR QL: NEGATIVE
NONHDLC SERPL-MCNC: 68 MG/DL
NRBC # BLD AUTO: 0 10E3/UL
NRBC BLD AUTO-RTO: 0 /100
PH UR STRIP: 6.5 [PH] (ref 4.7–8)
PLATELET # BLD AUTO: 293 10E3/UL (ref 150–450)
POTASSIUM SERPL-SCNC: 3.9 MMOL/L (ref 3.4–5.3)
PROT SERPL-MCNC: 6.6 G/DL (ref 6.4–8.3)
RBC # BLD AUTO: 4.08 10E6/UL (ref 3.8–5.2)
RBC URINE: 1 /HPF
SODIUM SERPL-SCNC: 137 MMOL/L (ref 136–145)
SP GR UR STRIP: 1.02 (ref 1–1.03)
SQUAMOUS EPITHELIAL: 7 /HPF
TRIGL SERPL-MCNC: 72 MG/DL
TSH SERPL DL<=0.005 MIU/L-ACNC: 2.46 UIU/ML (ref 0.3–4.2)
UROBILINOGEN UR STRIP-MCNC: NORMAL MG/DL
WBC # BLD AUTO: 6.1 10E3/UL (ref 4–11)
WBC URINE: 5 /HPF

## 2023-06-21 PROCEDURE — 84443 ASSAY THYROID STIM HORMONE: CPT | Mod: ZL

## 2023-06-21 PROCEDURE — 80061 LIPID PANEL: CPT | Mod: ZL

## 2023-06-21 PROCEDURE — 87086 URINE CULTURE/COLONY COUNT: CPT | Mod: ZL

## 2023-06-21 PROCEDURE — 80053 COMPREHEN METABOLIC PANEL: CPT | Mod: ZL

## 2023-06-21 PROCEDURE — 81001 URINALYSIS AUTO W/SCOPE: CPT | Mod: ZL

## 2023-06-21 PROCEDURE — 36415 COLL VENOUS BLD VENIPUNCTURE: CPT | Mod: ZL

## 2023-06-21 PROCEDURE — 85025 COMPLETE CBC W/AUTO DIFF WBC: CPT | Mod: ZL

## 2023-06-21 PROCEDURE — G0439 PPPS, SUBSEQ VISIT: HCPCS | Performed by: PHYSICIAN ASSISTANT

## 2023-06-21 RX ORDER — DEXTROAMPHETAMINE SACCHARATE, AMPHETAMINE ASPARTATE, DEXTROAMPHETAMINE SULFATE AND AMPHETAMINE SULFATE 5; 5; 5; 5 MG/1; MG/1; MG/1; MG/1
TABLET ORAL
Qty: 60 TABLET | Refills: 0 | Status: SHIPPED | OUTPATIENT
Start: 2023-06-21 | End: 2023-09-06

## 2023-06-21 RX ORDER — PRAMIPEXOLE DIHYDROCHLORIDE 0.5 MG/1
TABLET ORAL
Qty: 90 TABLET | Refills: 0 | Status: SHIPPED | OUTPATIENT
Start: 2023-06-21 | End: 2023-11-24

## 2023-06-21 RX ORDER — ESTRADIOL 0.1 MG/G
2 CREAM VAGINAL
Qty: 42.5 G | Refills: 1 | Status: SHIPPED | OUTPATIENT
Start: 2023-06-22 | End: 2024-09-05

## 2023-06-21 RX ORDER — SIMVASTATIN 10 MG
10 TABLET ORAL AT BEDTIME
Qty: 90 TABLET | Refills: 1 | Status: SHIPPED | OUTPATIENT
Start: 2023-06-21 | End: 2023-12-26

## 2023-06-21 RX ORDER — MODAFINIL 200 MG/1
TABLET ORAL
COMMUNITY
Start: 2022-06-28 | End: 2023-09-22

## 2023-06-21 RX ORDER — IPRATROPIUM BROMIDE AND ALBUTEROL SULFATE 2.5; .5 MG/3ML; MG/3ML
SOLUTION RESPIRATORY (INHALATION)
Qty: 90 ML | Refills: 0 | Status: SHIPPED | OUTPATIENT
Start: 2023-06-21 | End: 2023-11-14

## 2023-06-21 RX ORDER — CLINDAMYCIN PHOSPHATE 20 MG/G
1 CREAM VAGINAL AT BEDTIME
Qty: 80 G | Refills: 0 | Status: SHIPPED | OUTPATIENT
Start: 2023-06-21 | End: 2024-09-05

## 2023-06-21 RX ORDER — OXYBUTYNIN CHLORIDE 5 MG/1
5 TABLET, EXTENDED RELEASE ORAL DAILY
Qty: 30 TABLET | Refills: 3 | Status: SHIPPED | OUTPATIENT
Start: 2023-06-21 | End: 2023-09-22

## 2023-06-21 RX ORDER — MODAFINIL 200 MG/1
TABLET ORAL
COMMUNITY
Start: 2022-07-10 | End: 2023-09-22

## 2023-06-21 RX ORDER — LORAZEPAM 2 MG/1
2 TABLET ORAL EVERY 6 HOURS PRN
Qty: 2 TABLET | Refills: 0 | Status: SHIPPED | OUTPATIENT
Start: 2023-06-21 | End: 2023-09-22

## 2023-06-21 RX ORDER — DIAZEPAM 2 MG
2 TABLET ORAL EVERY 12 HOURS PRN
Qty: 20 TABLET | Refills: 0 | Status: SHIPPED | OUTPATIENT
Start: 2023-06-21 | End: 2023-09-22

## 2023-06-21 RX ORDER — TRAMADOL HYDROCHLORIDE 50 MG/1
TABLET ORAL
Qty: 60 TABLET | Refills: 0 | Status: SHIPPED | OUTPATIENT
Start: 2023-06-21

## 2023-06-21 RX ORDER — ALBUTEROL SULFATE 90 UG/1
AEROSOL, METERED RESPIRATORY (INHALATION)
Qty: 8.5 G | Refills: 0 | Status: SHIPPED | OUTPATIENT
Start: 2023-06-21 | End: 2024-09-05

## 2023-06-21 RX ORDER — CYCLOBENZAPRINE HCL 5 MG
5 TABLET ORAL 3 TIMES DAILY PRN
Qty: 90 TABLET | Refills: 3 | Status: SHIPPED | OUTPATIENT
Start: 2023-06-21

## 2023-06-21 RX ORDER — HYDROCHLOROTHIAZIDE 50 MG/1
TABLET ORAL
Qty: 90 TABLET | Refills: 2 | Status: SHIPPED | OUTPATIENT
Start: 2023-06-21

## 2023-06-21 RX ORDER — ZOLPIDEM TARTRATE 5 MG/1
TABLET ORAL
Qty: 30 TABLET | Refills: 1 | Status: SHIPPED | OUTPATIENT
Start: 2023-06-21 | End: 2023-09-22

## 2023-06-21 ASSESSMENT — ENCOUNTER SYMPTOMS
HEMATURIA: 0
HEARTBURN: 1
CONSTIPATION: 0
SORE THROAT: 0
DIZZINESS: 0
CHILLS: 0
MYALGIAS: 1
WEAKNESS: 0
ABDOMINAL PAIN: 0
PALPITATIONS: 0
HEADACHES: 0
PARESTHESIAS: 0
SHORTNESS OF BREATH: 1
BREAST MASS: 0
DIARRHEA: 0
ARTHRALGIAS: 0
COUGH: 1
DYSURIA: 0
HEMATOCHEZIA: 0
EYE PAIN: 0
FREQUENCY: 1
NAUSEA: 0
JOINT SWELLING: 0
FEVER: 0
NERVOUS/ANXIOUS: 1

## 2023-06-21 ASSESSMENT — ASTHMA QUESTIONNAIRES
QUESTION_4 LAST FOUR WEEKS HOW OFTEN HAVE YOU USED YOUR RESCUE INHALER OR NEBULIZER MEDICATION (SUCH AS ALBUTEROL): NOT AT ALL
ACT_TOTALSCORE: 25
ACUTE_EXACERBATION_TODAY: NO
QUESTION_5 LAST FOUR WEEKS HOW WOULD YOU RATE YOUR ASTHMA CONTROL: COMPLETELY CONTROLLED
QUESTION_1 LAST FOUR WEEKS HOW MUCH OF THE TIME DID YOUR ASTHMA KEEP YOU FROM GETTING AS MUCH DONE AT WORK, SCHOOL OR AT HOME: NONE OF THE TIME
ACT_TOTALSCORE: 25
QUESTION_3 LAST FOUR WEEKS HOW OFTEN DID YOUR ASTHMA SYMPTOMS (WHEEZING, COUGHING, SHORTNESS OF BREATH, CHEST TIGHTNESS OR PAIN) WAKE YOU UP AT NIGHT OR EARLIER THAN USUAL IN THE MORNING: NOT AT ALL
QUESTION_2 LAST FOUR WEEKS HOW OFTEN HAVE YOU HAD SHORTNESS OF BREATH: NOT AT ALL

## 2023-06-21 ASSESSMENT — ACTIVITIES OF DAILY LIVING (ADL): CURRENT_FUNCTION: NO ASSISTANCE NEEDED

## 2023-06-21 ASSESSMENT — PAIN SCALES - GENERAL: PAINLEVEL: MODERATE PAIN (4)

## 2023-06-21 ASSESSMENT — PATIENT HEALTH QUESTIONNAIRE - PHQ9: SUM OF ALL RESPONSES TO PHQ QUESTIONS 1-9: 9

## 2023-06-21 NOTE — PROGRESS NOTES
"SUBJECTIVE:   Chantel is a 73 year old who presents for Preventive Visit.      6/21/2023     8:36 AM   Additional Questions   Roomed by Jeffrey Martinez LPN   Accompanied by self         6/21/2023     8:36 AM   Patient Reported Additional Medications   Patient reports taking the following new medications none     Are you in the first 12 months of your Medicare coverage?  No    Healthy Habits:    In general, how would you rate your overall health?  Fair    Frequency of exercise:  2-3 days/week    Duration of exercise:  Less than 15 minutes    Do you usually eat at least 4 servings of fruit and vegetables a day, include whole grains    & fiber and avoid regularly eating high fat or \"junk\" foods?  No    Ability to successfully perform activities of daily living:  No assistance needed    Home Safety:  Lack of grab bars in the bathroom    Hearing Impairment:  Difficulty following a conversation in a noisy restaurant or crowded room    In the past 6 months, have you been bothered by leaking of urine? Yes    In general, how would you rate your overall mental or emotional health?  Fair      PHQ-2 Total Score:    Additional concerns today:  Yes        Have you ever done Advance Care Planning? (For example, a Health Directive, POLST, or a discussion with a medical provider or your loved ones about your wishes): Yes, advance care planning is on file.       Fall risk  Fallen 2 or more times in the past year?: No  Any fall with injury in the past year?: No    Cognitive Screening   1) Repeat 3 items (Leader, Season, Table)    2) Clock draw: NORMAL  3) 3 item recall: Recalls 3 objects  Results: 3 items recalled: COGNITIVE IMPAIRMENT LESS LIKELY    Mini-CogTM Copyright [employer]. Licensed by the author for use in [facility]; reprinted with permission (beny@.[email or web address]). All rights reserved.      Do you have sleep apnea, excessive snoring or daytime drowsiness?: daytime drowsiness- narcalepsy     Reviewed and updated as needed " this visit by clinical staff   Tobacco  Allergies  Meds              Reviewed and updated as needed this visit by Provider                 Social History     Tobacco Use     Smoking status: Never     Smokeless tobacco: Never   Substance Use Topics     Alcohol use: Yes     Comment: RARELY             6/21/2023     8:33 AM   Alcohol Use   Prescreen: >3 drinks/day or >7 drinks/week? Not Applicable     Do you have a current opioid prescription? Yes   How severe is your pain on a scale from 1-10? 4/10         Do you use any other controlled substances or medications that are not prescribed by a provider? None          Neck pain  Worsening. Can't sleep up in the base of her skull.       Current providers sharing in care for this patient include:   Patient Care Team:  Cindy Danielle PA as PCP - General  Cindy Danielle PA as Assigned PCP  Radha Villar MD as Assigned Neuroscience Provider  Jj Tabor MD as Assigned Sleep Provider    The following health maintenance items are reviewed in Epic and correct as of today:  Health Maintenance   Topic Date Due     COVID-19 Vaccine (1) Never done     HEPATITIS C SCREENING  Never done     ZOSTER IMMUNIZATION (2 of 3) 07/17/2014     MEDICARE ANNUAL WELLNESS VISIT  10/31/2020     ASTHMA ACTION PLAN  06/24/2022     INFLUENZA VACCINE (Season Ended) 09/01/2023     VALERIA ASSESSMENT  11/08/2023     ASTHMA CONTROL TEST  12/21/2023     PHQ-9  12/21/2023     COLORECTAL CANCER SCREENING  12/23/2023     MAMMO SCREENING  04/14/2024     FALL RISK ASSESSMENT  06/21/2024     ADVANCE CARE PLANNING  07/01/2025     LIPID  06/02/2027     DTAP/TDAP/TD IMMUNIZATION (3 - Td or Tdap) 06/25/2030     DEXA  07/15/2036     DEPRESSION ACTION PLAN  Completed     Pneumococcal Vaccine: 65+ Years  Completed     IPV IMMUNIZATION  Aged Out     MENINGITIS IMMUNIZATION  Aged Out     Lab work is in process  Labs reviewed in EPIC  BP Readings from Last 3 Encounters:   06/21/23 120/72    05/08/23 105/48   07/25/22 134/83    Wt Readings from Last 3 Encounters:   06/21/23 67.1 kg (148 lb)   05/08/23 71.7 kg (158 lb)   10/06/22 71.7 kg (158 lb)                  Patient Active Problem List   Diagnosis     Narcolepsy without 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)     Glucose intolerance (impaired glucose tolerance)     Insomnia     Major depressive disorder     Mixed stress and urge urinary incontinence     Leg cramps     Vitamin D deficiency disease     Complete rupture of rotator cuff     Esophageal reflux     Glucose intolerance     Myalgia and myositis     Open bimalleolar fracture     Other malaise and fatigue     Pain in joint, ankle and foot     Fibromyalgia     Scheurmann's disease     Other secondary scoliosis, thoracolumbar region     Chronic constipation     Benign paroxysmal positional vertigo due to bilateral vestibular disorder     Moderate episode of recurrent major depressive disorder (H)     Asthma     Past Surgical History:   Procedure Laterality Date     BILATERAL CATARACT EXTRACTION  01/01/2006 01/01/2011     Bunion Surgery > RT  01/01/2010     COLONOSCOPY  12/23/2013    Procedure: COLONOSCOPY;  COLONOSCOPY;  Surgeon: Kasia Enrique DO;  Location: [facility] OR     D&C  01/01/1991 01/01/2011     deviated septum repair  01/01/2012     ECHOCARDIOGRAM  01/01/2006 01/01/2011     HEMORRHOIDECTOMY  01/01/1979 01/01/2011     HYSTERECTOMY  01/01/1995 01/01/2011     LAPAROSCOPIC HERNIORRHAPHY VENTRAL  01/16/2014    Procedure: LAPAROSCOPIC HERNIORRHAPHY VENTRAL;  LAPAROSCOPIC VENTRAL HERNIA REPAIR W/ MESH;  Surgeon: Kasia Enrique DO;  Location: [facility] OR     leg repair after fx  >LT  01/01/2009     RELEASE CARPAL TUNNEL  01/01/2010    RT     TRIGGER  THUMB RELEASE  01/01/2011       Social History     Tobacco Use     Smoking status: Never     Smokeless tobacco: Never   Substance Use Topics     Alcohol use: Yes     Comment: RARELY     Family History   Problem Relation Age of Onset     Ovarian Cancer  Mother      Depression Mother      Hypertension Mother      Cancer Mother         Cervical     Parkinsonism Mother      Alcohol/Drug Father 42        Motor Vehicle Accident due to Alcoholism - Cause of Death     Other - See Comments Sister         ADD/ADHD     Other - See Comments Sister         Fibromyalgia     Diabetes Sister      Diabetes Sister      Other - See Comments Sister         ADD/ADHD     Other - See Comments Son         ADD/ADHD     Other - See Comments Son         ADD/ADHD     Diabetes Maternal Uncle      Parkinsonism Maternal Uncle          Current Outpatient Medications   Medication Sig Dispense Refill     albuterol (PROAIR HFA/PROVENTIL HFA/VENTOLIN HFA) 108 (90 Base) MCG/ACT inhaler INHALE 2 PUFFS INTO THE LUNGS EVERY 4 HOURS AS NEEDED FOR SHORTNESS OF BREATH/DYSPNEA. 8.5 g 0     amphetamine-dextroamphetamine (ADDERALL) 20 MG tablet TAKE 2 TABLETS BY MOUTH IN THE MORNING.  To avoid insomnia, last daily dose should be taken no less than 6 hours before bed. 60 tablet 0     cholecalciferol (VITAMIN D3) 1250 mcg (32281 units) capsule        clindamycin (CLEOCIN) 2 % vaginal cream Place 1 applicator vaginally At Bedtime 80 g 0     cyclobenzaprine (FLEXERIL) 5 MG tablet Take 1 tablet (5 mg) by mouth 3 times daily as needed for muscle spasms 90 tablet 3     diazepam (VALIUM) 2 MG tablet Take 1 tablet (2 mg) by mouth every 12 hours as needed (vertigo) 20 tablet 0     hydrochlorothiazide (HYDRODIURIL) 50 MG tablet TAKE 1 TABLET DAILY BY MOUTH AS NEEDED 90 tablet 2     ipratropium - albuterol 0.5 mg/2.5 mg/3 mL (DUONEB) 0.5-2.5 (3) MG/3ML neb solution NEBULIZE CONTENTS OF 1 VIALEVERY 6 HOURS AS NEEDED FORSHORTNESS OF BREATH OR WHEEZING 90 mL 0     LORazepam (ATIVAN) 2 MG tablet Take 1 tablet (2 mg) by mouth every 6 hours as needed for anxiety (preproceedure) 2 tablet 0     Multiple Vitamins-Minerals (PRESERVISION AREDS 2) CAPS Take 1 capful by mouth daily       oxybutynin ER (DITROPAN XL) 5 MG 24 hr tablet  "Take 1 tablet (5 mg) by mouth daily 30 tablet 3     polyethylene glycol (MIRALAX) 17 GM/Dose powder Take 17 g (1 capful) by mouth 2 times daily 850 g 3     pramipexole (MIRAPEX) 0.5 MG tablet TAKE 1 TABLET BY MOUTH EVERY EVENING 90 tablet 0     predniSONE (DELTASONE) 20 MG tablet TAKE 2 TABLETS BY MOUTH EVERY MORNING FOR 7 DAYS, 1 TABLET EVERY MORNING FOR 7 DAYS, THEN STOP 21 tablet 0     simvastatin (ZOCOR) 10 MG tablet Take 1 tablet (10 mg) by mouth At Bedtime 90 tablet 1     traMADol (ULTRAM) 50 MG tablet TAKE 1 TO 2 TABLETS BY MOUTH EVERY 6 HOURS AS NEEDED FOR MODERATE PAIN 60 tablet 0     zolpidem (AMBIEN) 5 MG tablet TAKE 1 TABLET (5 MG) BY MOUTH NIGHTLY AS NEEDED FORSLEEP 30 tablet 1     modafinil (PROVIGIL) 200 MG tablet TAKE ONE-HALF TABLET BY MOUTH DAILY IN THE MORNING. AFTER 1 WEEK, MAY INCREASE TO 1 TABLET DAILY IF NEEDED (Patient not taking: Reported on 6/21/2023)       modafinil (PROVIGIL) 200 MG tablet Take 0.5 tablet daily in the morning.  After one week may increase to a full tablet daily if needed. (Patient not taking: Reported on 6/21/2023)       Allergies   Allergen Reactions     Acetaminophen      Darvocet-N 100       Aluminum      Clonidine      Dropped BP Drastically     Codamine Itching and Other (See Comments)     \"Buzzy\"     Codeine      Hydrocodone      \"Patient states can take liquid med but not pill\".     Meperidine      Oxycodone-Aspirin Itching, Nausea and Vomiting and Other (See Comments)     \"Buzzy\"     Pimozide Other (See Comments)     Drowsiness     Pregabalin Headache, Itching and Swelling     Propoxyphene Napsylate      Darvocet-N 100     Seasonal Allergies Other (See Comments)     Seasonal allergies.  Per 7/3/07, runny nose, stuffy, plugged ears.     Recent Labs   Lab Test 06/21/23  0831 07/25/22  1346 06/02/22  1732 03/03/22  1203 06/24/21  1440 06/25/20  1145 02/11/20  0032 10/31/19  1105 12/26/18  1507 07/31/18  0914   A1C  --  5.9  --   --   --  5.5  --  6.0* 5.9* 5.8*   LDL " 54  --  101*  --   --   --   --   --   --  91   HDL 69  --  77  --   --   --   --   --   --  85   TRIG 72  --  79  --   --   --   --   --   --  51   ALT 18  --  18  --  17 23   < > 18 22  --    CR 0.81  --  0.86   < > 0.88 0.98   < > 0.80 0.90 0.81   GFRESTIMATED 75  --  70   < > 65 57*   < > 74 64 70   GFRESTBLACK  --   --   --   --  76 66   < > 86 74 85   POTASSIUM 3.9  --  3.7   < > 4.0 3.9   < > 4.4 3.9 4.0   TSH 2.46  --   --   --   --   --   --   --  1.25 1.67    < > = values in this interval not displayed.          Mammogram Screening: Mammogram Screening: Recommended mammography every 1-2 years with patient discussion and risk factor consideration  Last 3 Pap and HPV Results:        Mammogram Screening: Recommended mammography every 1-2 years with patient discussion and risk factor consideration  Pertinent mammograms are reviewed under the imaging tab.    Review of Systems   Constitutional: Negative for chills and fever.   HENT: Positive for congestion and hearing loss. Negative for ear pain and sore throat.    Eyes: Negative for pain and visual disturbance.   Respiratory: Positive for cough and shortness of breath.    Cardiovascular: Negative for chest pain, palpitations and peripheral edema.   Gastrointestinal: Positive for heartburn. Negative for abdominal pain, constipation, diarrhea, hematochezia and nausea.   Breasts:  Negative for tenderness, breast mass and discharge.   Genitourinary: Positive for frequency, genital sores and urgency. Negative for dysuria, hematuria, pelvic pain, vaginal bleeding and vaginal discharge.   Musculoskeletal: Positive for myalgias. Negative for arthralgias and joint swelling.   Skin: Negative for rash.   Neurological: Negative for dizziness, weakness, headaches and paresthesias.   Psychiatric/Behavioral: Positive for mood changes. The patient is nervous/anxious.      Vulvar atrophy. Base of vulva.     OBJECTIVE:   /72 (BP Location: Left arm, Patient Position:  "Sitting, Cuff Size: Adult Large)   Pulse 74   Temp (!) 96.6  F (35.9  C) (Tympanic)   Resp 16   Wt 67.1 kg (148 lb)   SpO2 98%   BMI 25.81 kg/m   Estimated body mass index is 25.81 kg/m  as calculated from the following:    Height as of 10/6/22: 1.613 m (5' 3.5\").    Weight as of this encounter: 67.1 kg (148 lb).  Physical Exam  GENERAL APPEARANCE: healthy, alert and no distress  EYES: Eyes grossly normal to inspection, PERRL and conjunctivae and sclerae normal  HENT: ear canals and TM's normal, nose and mouth without ulcers or lesions, oropharynx clear and oral mucous membranes moist  NECK: no adenopathy, no asymmetry, masses, or scars and thyroid normal to palpation  RESP: lungs clear to auscultation - no rales, rhonchi or wheezes  BREAST: normal without masses, tenderness or nipple discharge and no palpable axillary masses or adenopathy  CV: regular rate and rhythm, normal S1 S2, no S3 or S4, no murmur, click or rub, no peripheral edema and peripheral pulses strong  ABDOMEN: soft, nontender, no hepatosplenomegaly, no masses and bowel sounds normal  MS: no musculoskeletal defects are noted and gait is age appropriate without ataxia  SKIN: no suspicious lesions or rashes  NEURO: Normal strength and tone, sensory exam grossly normal, mentation intact and speech normal  PSYCH: mentation appears normal and affect normal/bright    Diagnostic Test Results:  Labs reviewed in Epic  Results for orders placed or performed in visit on 06/21/23 (from the past 24 hour(s))   Lipid Profile (Chol, Trig, HDL, LDL calc)   Result Value Ref Range    Cholesterol 137 <200 mg/dL    Triglycerides 72 <150 mg/dL    Direct Measure HDL 69 >=50 mg/dL    LDL Cholesterol Calculated 54 <=100 mg/dL    Non HDL Cholesterol 68 <130 mg/dL    Narrative    Cholesterol  Desirable:  <200 mg/dL    Triglycerides  Normal:  Less than 150 mg/dL  Borderline High:  150-199 mg/dL  High:  200-499 mg/dL  Very High:  Greater than or equal to 500 " mg/dL    Direct Measure HDL  Female:  Greater than or equal to 50 mg/dL   Male:  Greater than or equal to 40 mg/dL    LDL Cholesterol  Desirable:  <100mg/dL  Above Desirable:  100-129 mg/dL   Borderline High:  130-159 mg/dL   High:  160-189 mg/dL   Very High:  >= 190 mg/dL    Non HDL Cholesterol  Desirable:  130 mg/dL  Above Desirable:  130-159 mg/dL  Borderline High:  160-189 mg/dL  High:  190-219 mg/dL  Very High:  Greater than or equal to 220 mg/dL   TSH with free T4 reflex   Result Value Ref Range    TSH 2.46 0.30 - 4.20 uIU/mL   Comprehensive metabolic panel (BMP + Alb, Alk Phos, ALT, AST, Total. Bili, TP)   Result Value Ref Range    Sodium 137 136 - 145 mmol/L    Potassium 3.9 3.4 - 5.3 mmol/L    Chloride 102 98 - 107 mmol/L    Carbon Dioxide (CO2) 25 22 - 29 mmol/L    Anion Gap 10 7 - 15 mmol/L    Urea Nitrogen 17.7 8.0 - 23.0 mg/dL    Creatinine 0.81 0.51 - 0.95 mg/dL    Calcium 9.3 8.8 - 10.2 mg/dL    Glucose 112 (H) 70 - 99 mg/dL    Alkaline Phosphatase 79 35 - 104 U/L    AST 19 0 - 45 U/L    ALT 18 0 - 50 U/L    Protein Total 6.6 6.4 - 8.3 g/dL    Albumin 3.8 3.5 - 5.2 g/dL    Bilirubin Total 0.5 <=1.2 mg/dL    GFR Estimate 75 >60 mL/min/1.73m2   CBC with platelets and differential    Narrative    The following orders were created for panel order CBC with platelets and differential.  Procedure                               Abnormality         Status                     ---------                               -----------         ------                     CBC with platelets and d...[[id number]]                      Final result                 Please view results for these tests on the individual orders.   CBC with platelets and differential   Result Value Ref Range    WBC Count 6.1 4.0 - 11.0 10e3/uL    RBC Count 4.08 3.80 - 5.20 10e6/uL    Hemoglobin 12.9 11.7 - 15.7 g/dL    Hematocrit 37.7 35.0 - 47.0 %    MCV 92 78 - 100 fL    MCH 31.6 26.5 - 33.0 pg    MCHC 34.2 31.5 - 36.5 g/dL    RDW 12.6 10.0 - 15.0  %    Platelet Count 293 150 - 450 10e3/uL    % Neutrophils 51 %    % Lymphocytes 36 %    % Monocytes 9 %    % Eosinophils 3 %    % Basophils 1 %    % Immature Granulocytes 0 %    NRBCs per 100 WBC 0 <1 /100    Absolute Neutrophils 3.1 1.6 - 8.3 10e3/uL    Absolute Lymphocytes 2.2 0.8 - 5.3 10e3/uL    Absolute Monocytes 0.5 0.0 - 1.3 10e3/uL    Absolute Eosinophils 0.2 0.0 - 0.7 10e3/uL    Absolute Basophils 0.1 0.0 - 0.2 10e3/uL    Absolute Immature Granulocytes 0.0 <=0.4 10e3/uL    Absolute NRBCs 0.0 10e3/uL       ASSESSMENT / PLAN:       ICD-10-CM    1. Encounter for Medicare annual wellness exam  Z00.00       2. Bronchospasm  J98.01 albuterol (PROAIR HFA/PROVENTIL HFA/VENTOLIN HFA) 108 (90 Base) MCG/ACT inhaler      3. Vaginitis and vulvovaginitis  N76.0 clindamycin (CLEOCIN) 2 % vaginal cream      4. Fibromyalgia  M79.7 cyclobenzaprine (FLEXERIL) 5 MG tablet     traMADol (ULTRAM) 50 MG tablet      5. Restless legs syndrome (RLS)  G25.81 diazepam (VALIUM) 2 MG tablet     zolpidem (AMBIEN) 5 MG tablet      6. Essential hypertension  I10 hydrochlorothiazide (HYDRODIURIL) 50 MG tablet      7. Mild asthma with acute exacerbation, unspecified whether persistent  J45.901 ipratropium - albuterol 0.5 mg/2.5 mg/3 mL (DUONEB) 0.5-2.5 (3) MG/3ML neb solution      8. Nocturia  R35.1 oxybutynin ER (DITROPAN XL) 5 MG 24 hr tablet      9. Benign paroxysmal positional vertigo due to bilateral vestibular disorder  H81.13       10. Restless legs syndrome  G25.81 pramipexole (MIRAPEX) 0.5 MG tablet      11. Cerebrovascular accident (CVA) due to occlusion of small artery (H)  I63.81 simvastatin (ZOCOR) 10 MG tablet      12. Neck pain  M54.2 MR Cervical Spine w/o Contrast     LORazepam (ATIVAN) 2 MG tablet     XR CERVICAL SPINE G/E 4 VIEWS (Clinic Performed)      13. Narcolepsy and cataplexy  G47.411 amphetamine-dextroamphetamine (ADDERALL) 20 MG tablet      14. Elevated glucose  R73.09 Hemoglobin A1c          Patient has been  advised of split billing requirements and indicates understanding: Yes      COUNSELING:  Reviewed preventive health counseling, as reflected in patient instructions       Regular exercise       Healthy diet/nutrition       Vision screening       Hearing screening       Bladder control       Osteoporosis prevention/bone health       Advanced Planning         She reports that she has never smoked. She has never used smokeless tobacco.      Appropriate preventive services were discussed with this patient, including applicable screening as appropriate for cardiovascular disease, diabetes, osteopenia/osteoporosis, and glaucoma.  As appropriate for age/gender, discussed screening for colorectal cancer, prostate cancer, breast cancer, and cervical cancer. Checklist reviewing preventive services available has been given to the patient.    Reviewed patients plan of care and provided an AVS. The Intermediate Care Plan ( asthma action plan, low back pain action plan, and migraine action plan) for Anya meets the Care Plan requirement. This Care Plan has been established and reviewed with the Patient.      RADHAMES Bowman  [facility] - [place]    Identified Health Risks:

## 2023-06-22 DIAGNOSIS — N30.00 ACUTE CYSTITIS WITHOUT HEMATURIA: Primary | ICD-10-CM

## 2023-06-22 LAB — BACTERIA UR CULT: ABNORMAL

## 2023-06-22 RX ORDER — AMOXICILLIN 500 MG/1
500 CAPSULE ORAL 3 TIMES DAILY
Qty: 30 CAPSULE | Refills: 0 | Status: SHIPPED | OUTPATIENT
Start: 2023-06-22 | End: 2023-09-22

## 2023-07-07 ENCOUNTER — HOSPITAL ENCOUNTER (OUTPATIENT)
Dept: MRI IMAGING | Facility: HOSPITAL | Age: 73
Discharge: HOME | End: 2023-07-07
Attending: PHYSICIAN ASSISTANT | Admitting: PHYSICIAN ASSISTANT
Payer: MEDICARE

## 2023-07-07 DIAGNOSIS — M54.2 NECK PAIN: ICD-10-CM

## 2023-07-07 PROCEDURE — G1010 CDSM STANSON: HCPCS

## 2023-09-05 DIAGNOSIS — G47.411 NARCOLEPSY AND CATAPLEXY: ICD-10-CM

## 2023-09-06 RX ORDER — DEXTROAMPHETAMINE SACCHARATE, AMPHETAMINE ASPARTATE, DEXTROAMPHETAMINE SULFATE AND AMPHETAMINE SULFATE 5; 5; 5; 5 MG/1; MG/1; MG/1; MG/1
TABLET ORAL
Qty: 60 TABLET | Refills: 0 | Status: SHIPPED | OUTPATIENT
Start: 2023-09-06 | End: 2023-11-01

## 2023-09-06 NOTE — TELEPHONE ENCOUNTER
Adderall      Last Written Prescription Date:  7.28.23  Last Fill Quantity: #60,   # refills: 0  Last Office Visit: 6.21.23  Future Office visit:       Routing refill request to provider for review/approval because:  Drug not on the [facility], P or [facility] refill protocol or controlled substance

## 2023-09-22 ENCOUNTER — OFFICE VISIT (OUTPATIENT)
Dept: FAMILY MEDICINE | Facility: OTHER | Age: 73
End: 2023-09-22
Attending: STUDENT IN AN ORGANIZED HEALTH CARE EDUCATION/TRAINING PROGRAM
Payer: COMMERCIAL

## 2023-09-22 ENCOUNTER — DOCUMENTATION ONLY (OUTPATIENT)
Dept: OTHER | Facility: CLINIC | Age: 73
End: 2023-09-22
Payer: COMMERCIAL

## 2023-09-22 VITALS
WEIGHT: 133 LBS | HEART RATE: 85 BPM | SYSTOLIC BLOOD PRESSURE: 130 MMHG | BODY MASS INDEX: 23.19 KG/M2 | DIASTOLIC BLOOD PRESSURE: 68 MMHG | TEMPERATURE: 96.8 F | OXYGEN SATURATION: 97 %

## 2023-09-22 DIAGNOSIS — N30.00 ACUTE CYSTITIS WITHOUT HEMATURIA: ICD-10-CM

## 2023-09-22 DIAGNOSIS — R63.4 WEIGHT LOSS: ICD-10-CM

## 2023-09-22 DIAGNOSIS — G25.81 RESTLESS LEGS SYNDROME (RLS): ICD-10-CM

## 2023-09-22 DIAGNOSIS — N39.46 MIXED STRESS AND URGE URINARY INCONTINENCE: Chronic | ICD-10-CM

## 2023-09-22 DIAGNOSIS — R73.09 ELEVATED GLUCOSE: ICD-10-CM

## 2023-09-22 DIAGNOSIS — F41.1 GAD (GENERALIZED ANXIETY DISORDER): ICD-10-CM

## 2023-09-22 DIAGNOSIS — R82.90 ABNORMAL URINE ODOR: Primary | ICD-10-CM

## 2023-09-22 DIAGNOSIS — L29.2 VULVAR ITCHING: ICD-10-CM

## 2023-09-22 DIAGNOSIS — F43.21 GRIEF: ICD-10-CM

## 2023-09-22 PROBLEM — F33.1 MODERATE EPISODE OF RECURRENT MAJOR DEPRESSIVE DISORDER (H): Chronic | Status: ACTIVE | Noted: 2022-03-10

## 2023-09-22 PROBLEM — J45.909 ASTHMA: Chronic | Status: ACTIVE | Noted: 2022-03-10

## 2023-09-22 LAB
ALBUMIN UR-MCNC: NEGATIVE MG/DL
APPEARANCE UR: ABNORMAL
BACTERIA #/AREA URNS HPF: ABNORMAL /HPF
BILIRUB UR QL STRIP: NEGATIVE
COLOR UR AUTO: YELLOW
EST. AVERAGE GLUCOSE BLD GHB EST-MCNC: 123 MG/DL
GLUCOSE UR STRIP-MCNC: NEGATIVE MG/DL
HBA1C MFR BLD: 5.9 %
HGB UR QL STRIP: NEGATIVE
KETONES UR STRIP-MCNC: NEGATIVE MG/DL
LEUKOCYTE ESTERASE UR QL STRIP: ABNORMAL
MUCOUS THREADS #/AREA URNS LPF: PRESENT /LPF
NITRATE UR QL: POSITIVE
PH UR STRIP: 6 [PH] (ref 4.7–8)
RBC URINE: 5 /HPF
SP GR UR STRIP: 1.01 (ref 1–1.03)
SQUAMOUS EPITHELIAL: 3 /HPF
UROBILINOGEN UR STRIP-MCNC: NORMAL MG/DL
WBC URINE: 19 /HPF

## 2023-09-22 PROCEDURE — 83036 HEMOGLOBIN GLYCOSYLATED A1C: CPT | Mod: ZL | Performed by: STUDENT IN AN ORGANIZED HEALTH CARE EDUCATION/TRAINING PROGRAM

## 2023-09-22 PROCEDURE — G0463 HOSPITAL OUTPT CLINIC VISIT: HCPCS

## 2023-09-22 PROCEDURE — 81001 URINALYSIS AUTO W/SCOPE: CPT | Mod: ZL | Performed by: STUDENT IN AN ORGANIZED HEALTH CARE EDUCATION/TRAINING PROGRAM

## 2023-09-22 PROCEDURE — 99214 OFFICE O/P EST MOD 30 MIN: CPT | Performed by: STUDENT IN AN ORGANIZED HEALTH CARE EDUCATION/TRAINING PROGRAM

## 2023-09-22 PROCEDURE — 87186 SC STD MICRODIL/AGAR DIL: CPT | Mod: ZL | Performed by: STUDENT IN AN ORGANIZED HEALTH CARE EDUCATION/TRAINING PROGRAM

## 2023-09-22 PROCEDURE — 36415 COLL VENOUS BLD VENIPUNCTURE: CPT | Mod: ZL | Performed by: STUDENT IN AN ORGANIZED HEALTH CARE EDUCATION/TRAINING PROGRAM

## 2023-09-22 RX ORDER — CEPHALEXIN 500 MG/1
500 CAPSULE ORAL 2 TIMES DAILY
Qty: 14 CAPSULE | Refills: 0 | Status: SHIPPED | OUTPATIENT
Start: 2023-09-22 | End: 2023-09-25 | Stop reason: ALTCHOICE

## 2023-09-22 RX ORDER — TRIAMCINOLONE ACETONIDE 1 MG/G
OINTMENT TOPICAL EVERY EVENING
Qty: 30 G | Refills: 0 | Status: SHIPPED | OUTPATIENT
Start: 2023-09-22 | End: 2024-09-05

## 2023-09-22 RX ORDER — ZOLPIDEM TARTRATE 5 MG/1
TABLET ORAL
Qty: 30 TABLET | Refills: 0 | Status: SHIPPED | OUTPATIENT
Start: 2023-09-22 | End: 2023-10-25

## 2023-09-22 RX ORDER — DIAZEPAM 2 MG
2 TABLET ORAL EVERY 12 HOURS PRN
COMMUNITY

## 2023-09-22 RX ORDER — OXYBUTYNIN CHLORIDE 5 MG/1
5 TABLET, EXTENDED RELEASE ORAL DAILY
Qty: 30 TABLET | Refills: 3 | Status: SHIPPED | OUTPATIENT
Start: 2023-09-22 | End: 2023-10-26

## 2023-09-22 RX ORDER — CITALOPRAM HYDROBROMIDE 10 MG/1
10 TABLET ORAL DAILY
Qty: 30 TABLET | Refills: 1 | Status: SHIPPED | OUTPATIENT
Start: 2023-09-22 | End: 2023-10-26

## 2023-09-22 ASSESSMENT — ANXIETY QUESTIONNAIRES
1. FEELING NERVOUS, ANXIOUS, OR ON EDGE: NEARLY EVERY DAY
3. WORRYING TOO MUCH ABOUT DIFFERENT THINGS: MORE THAN HALF THE DAYS
6. BECOMING EASILY ANNOYED OR IRRITABLE: SEVERAL DAYS
IF YOU CHECKED OFF ANY PROBLEMS ON THIS QUESTIONNAIRE, HOW DIFFICULT HAVE THESE PROBLEMS MADE IT FOR YOU TO DO YOUR WORK, TAKE CARE OF THINGS AT HOME, OR GET ALONG WITH OTHER PEOPLE: SOMEWHAT DIFFICULT
GAD7 TOTAL SCORE: 13
2. NOT BEING ABLE TO STOP OR CONTROL WORRYING: MORE THAN HALF THE DAYS
GAD7 TOTAL SCORE: 13
5. BEING SO RESTLESS THAT IT IS HARD TO SIT STILL: MORE THAN HALF THE DAYS
7. FEELING AFRAID AS IF SOMETHING AWFUL MIGHT HAPPEN: SEVERAL DAYS

## 2023-09-22 ASSESSMENT — PATIENT HEALTH QUESTIONNAIRE - PHQ9
5. POOR APPETITE OR OVEREATING: MORE THAN HALF THE DAYS
SUM OF ALL RESPONSES TO PHQ QUESTIONS 1-9: 19

## 2023-09-22 ASSESSMENT — PAIN SCALES - GENERAL: PAINLEVEL: NO PAIN (0)

## 2023-09-22 NOTE — PROGRESS NOTES
Assessment & Plan     Abnormal urine odor  No signs/symptoms of obvious infection but does have occasional odor change and increase in frequency.   Will update UA - did have infection in June 2023 with similar symptoms.   - UA with Microscopic reflex to Culture - [place]; Future    Grief  VALERIA (generalized anxiety disorder)  Struggling, as would be expected, with loss of  but has worsened baseline anxiety level. Interested in medication.   History of celexa - will start at 10mg. Discussed possible side effects.  Referral for therapy - pcp had requested vinny here before but she never heard to schedule.   Follow up with PCP in 4 weeks but encouraged follow up with me sooner if symptoms worsen, side effects develop or new concerns arise.   - Adult Mental Health  Referral; Future  - citalopram (CELEXA) 10 MG tablet; Take 1 tablet (10 mg) by mouth daily    Mixed stress and urge urinary incontinence  Refilled. Has been helpful.   - oxyBUTYnin ER (DITROPAN XL) 5 MG 24 hr tablet; Take 1 tablet (5 mg) by mouth daily    Vulvar itching  Chronic, intermittent over years.   No obvious lichen sclerosus on exam. She does have one focal area that is slightly pale/erythematous. Question if that is secondary to an undiagnosed lichen sclerosus but no obvious plaques, fusion or classic findings. Is already on estrogen cream.   Discussed having her see OB to consider biopsy for diagnosis of lichen vs trial of steroid first. She preferred to trial steroid cream - did start with lower potency than clobetasol for now. Do not use >14 days.   Should be reassessed at follow up.   Follow up sooner if symptoms worsen or fail to improve.   - triamcinolone (KENALOG) 0.1 % external ointment; Apply topically every evening Do not use for more than 14 days. Apply to affected area in vulvar region.    Restless legs syndrome (RLS)  Refilled ambien - prescribed by PCP.   - zolpidem (AMBIEN) 5 MG tablet; TAKE 1 TABLET (5 MG) BY MOUTH  NIGHTLY AS NEEDED JALEESA    Weight loss  Has lost 20lb in six months since her  . She has been anxious, down, and more active/distracted with less eating. No pain or red flag findings today. Had reassuring labs in 2023. Discussed if symptoms arise or she continues to lose weight (would like her to monitor), I would like to see her back right away for a full evaluation.     I spent a total of 32 minutes on the day of the visit.   Time spent by me doing chart review, history and exam, documentation and further activities per the note      Teresa Pereira MD  [facility] - MAHOGANY Garcia   Ana Maria is a 73 year old, presenting for the following health issues:      HPI     Concern - Urinary system   Onset: About a month ago   Description: Stated it smells like ammonia. States she is having some itching. Was on a antibiotic (amoxicillin), but had it before then.   Intensity: moderate  Progression of Symptoms:  same  Accompanying Signs & Symptoms: Itching, and has a rash on one of her labia's that comes and goes  Previous history of similar problem: Had it in the past about a year and a half ago. Was put on steroid cream she believes. The cream cleared it up. Was on clindamycin vaginal cream on 2023  Precipitating factors:        Worsened by: None  Alleviating factors:        Improved by: When she was on  amoxicillin it seemed to clear up  Therapies tried and outcome: OTC hydrocortisone cream    History of vaginal rash - got a cream  1.5mo ago urine has ammonia smell   Weight loss since  ; eating much less; way more distracted   Rare burning with urination.   No new frequency.   Has had more break through with the oxybutynin.   Does feel like emptying completely.   No fevers. No flank pain.   Had similar symptoms in    Some labial itching - had in the past - responded to clobetasol - no diagnosis of lichen sclerosus   Does have vaginal estrogen  No discharge      Panic attack one week ago - couldn't breathe.   Struggling since   in April   History of celexa, wellbutrin   Strong history of anxiety in family  Daily symptoms - crying, etc   Did join a grief group  Does bible study   Wants to do therapy         Objective    /68 (BP Location: Right arm, Patient Position: Sitting, Cuff Size: Adult Regular)   Pulse 85   Temp 96.8  F (36  C) (Tympanic)   Wt 60.3 kg (133 lb)   SpO2 97%   BMI 23.19 kg/m    Body mass index is 23.19 kg/m .    Physical Exam  Constitutional:       General: She is not in acute distress.     Appearance: Normal appearance. She is not ill-appearing.   Pulmonary:      Effort: Pulmonary effort is normal.   Abdominal:      Tenderness: There is no abdominal tenderness. There is no right CVA tenderness or left CVA tenderness.   Genitourinary:     Labia:         Right: No rash or tenderness.         Left: No rash or tenderness.           Comments: Questionable area of erythema/whitening that could be lichen sclerosus. No other plaques/lesions. No fusion.   Skin:     General: Skin is warm and dry.   Neurological:      Mental Status: She is alert.   Psychiatric:         Attention and Perception: Attention normal.         Mood and Affect: Affect is tearful.         Speech: Speech normal.         Behavior: Behavior normal.         Thought Content: Thought content normal.

## 2023-09-22 NOTE — PATIENT INSTRUCTIONS
Apply small amount of steroid cream to affected area at night for up to two weeks. If symptoms do not improve, recommend we have you see OB-GYN.   Referral for therapy here at [facility]. They will call.   Start celexa 10mg once daily.   Recheck urine today - will call or send message with results.   Follow up in four weeks.

## 2023-09-22 NOTE — RESULT ENCOUNTER NOTE
Please call and notify patient that her urine is positive for infection - I sent in keflex twice daily for 7 days.

## 2023-09-24 LAB — BACTERIA UR CULT: ABNORMAL

## 2023-09-25 RX ORDER — CEFPODOXIME PROXETIL 100 MG/1
100 TABLET, FILM COATED ORAL 2 TIMES DAILY
Qty: 10 TABLET | Refills: 0 | Status: SHIPPED | OUTPATIENT
Start: 2023-09-25 | End: 2023-09-30

## 2023-09-25 NOTE — RESULT ENCOUNTER NOTE
Please call and notify patient that her urine is only intermediate sensitive to the antibiotic (keflex) I sent in on Friday. I'd like to change her to cefpodoxime 100mg twice daily for 5 days. Will send to pharmacy now.

## 2023-10-19 ENCOUNTER — TELEPHONE (OUTPATIENT)
Dept: FAMILY MEDICINE | Facility: OTHER | Age: 73
End: 2023-10-19

## 2023-10-19 DIAGNOSIS — F41.9 ANXIETY: Primary | ICD-10-CM

## 2023-10-19 RX ORDER — CITALOPRAM HYDROBROMIDE 20 MG/1
20 TABLET ORAL DAILY
Qty: 30 TABLET | Refills: 0 | Status: CANCELLED | OUTPATIENT
Start: 2023-10-19

## 2023-10-19 NOTE — TELEPHONE ENCOUNTER
Patient is requesting that the Celexa be increased to 20 mg a day.  She is still crying, anxious, not feeling the best..  She has an appointment 11/20 with Lolis but she doesn't want to wait that long.  If you want to squeeze her in she will come in.  She was seen on 9/22 and started 10 mg Celexa.  Send to [facility]

## 2023-10-19 NOTE — TELEPHONE ENCOUNTER
Patient was supposed to be seen at one month (now).   Okay to increase to 20mg but needs follow up in the next week.   Please see if Cindy will fit her in.   If not, I will see her 10/26/23 at 4:30pm (my Dr only slot).

## 2023-10-23 DIAGNOSIS — G25.81 RESTLESS LEGS SYNDROME (RLS): ICD-10-CM

## 2023-10-24 NOTE — TELEPHONE ENCOUNTER
AMBIEN      Last Written Prescription Date:  9-22-23  Last Fill Quantity: 30,   # refills: 0  Last Office Visit: 9-22-23  Future Office visit:    Next 5 appointments (look out 90 days)      Oct 26, 2023  4:30 PM  (Arrive by 4:15 PM)  SHORT with RADHAMES Avendaño  [facility] [place] ([facility] [place] ) [address]  [place] [address]  [phone]     Nov 15, 2023  9:15 AM  (Arrive by 9:00 AM)  SHORT with RADHAMES Avendaño  [facility] [place] ([facility] [place] ) [address]  [place] [address]  [phone]             Routing refill request to provider for review/approval because:  Drug not on the [facility], [facility] or [facility] refill protocol or controlled substance

## 2023-10-25 RX ORDER — ZOLPIDEM TARTRATE 5 MG/1
TABLET ORAL
Qty: 30 TABLET | Refills: 3 | Status: SHIPPED | OUTPATIENT
Start: 2023-10-25 | End: 2024-08-11

## 2023-10-26 ENCOUNTER — OFFICE VISIT (OUTPATIENT)
Dept: FAMILY MEDICINE | Facility: OTHER | Age: 73
End: 2023-10-26
Attending: PHYSICIAN ASSISTANT
Payer: COMMERCIAL

## 2023-10-26 VITALS
OXYGEN SATURATION: 95 % | HEART RATE: 85 BPM | TEMPERATURE: 97.7 F | SYSTOLIC BLOOD PRESSURE: 119 MMHG | DIASTOLIC BLOOD PRESSURE: 68 MMHG

## 2023-10-26 DIAGNOSIS — F43.21 GRIEF: ICD-10-CM

## 2023-10-26 DIAGNOSIS — F41.1 GAD (GENERALIZED ANXIETY DISORDER): ICD-10-CM

## 2023-10-26 DIAGNOSIS — N39.46 MIXED STRESS AND URGE URINARY INCONTINENCE: Chronic | ICD-10-CM

## 2023-10-26 PROCEDURE — G0463 HOSPITAL OUTPT CLINIC VISIT: HCPCS

## 2023-10-26 PROCEDURE — 99214 OFFICE O/P EST MOD 30 MIN: CPT | Performed by: PHYSICIAN ASSISTANT

## 2023-10-26 RX ORDER — OXYBUTYNIN CHLORIDE 10 MG/1
10 TABLET, EXTENDED RELEASE ORAL DAILY
Qty: 30 TABLET | Refills: 1 | Status: SHIPPED | OUTPATIENT
Start: 2023-10-26

## 2023-10-26 RX ORDER — CITALOPRAM HYDROBROMIDE 20 MG/1
20 TABLET ORAL DAILY
Qty: 30 TABLET | Refills: 3 | Status: SHIPPED | OUTPATIENT
Start: 2023-10-26 | End: 2024-07-16

## 2023-10-26 ASSESSMENT — PAIN SCALES - GENERAL: PAINLEVEL: NO PAIN (0)

## 2023-10-26 ASSESSMENT — PATIENT HEALTH QUESTIONNAIRE - PHQ9: SUM OF ALL RESPONSES TO PHQ QUESTIONS 1-9: 18

## 2023-10-26 NOTE — PROGRESS NOTES
Assessment & Plan     VALERIA (generalized anxiety disorder)  She was given 10 mg of Celexa.  Thinking this needs to be up little bit. Still can't slow her mind down.    - citalopram (CELEXA) 20 MG tablet; Take 1 tablet (20 mg) by mouth daily    Grief  She is still really sad. Working with a grief counselor. This has helped her out .    - citalopram (CELEXA) 20 MG tablet; Take 1 tablet (20 mg) by mouth daily    Mixed stress and urge urinary incontinence  Discussion on pelvic floor therapy.  She would like to consider a sling if able.  We are going to increase her Oxybutynin to 10 mg an then get her to have her voiding studies with a Urologist.  See if we can expedite this before the end of the year.   - oxyBUTYnin ER (DITROPAN XL) 10 MG 24 hr tablet; Take 1 tablet (10 mg) by mouth daily  - Adult Urology  Referral; Future  - UA Macroscopic with reflex to Microscopic and Culture; Future             See Patient Instructions    No follow-ups on file.    RADHAMES Bowman  [facility] - MAHOG[facility] Rodgers is a 73 year old, presenting for the following health issues:  Recheck Medication        10/26/2023     4:10 PM   Additional Questions   Roomed by Katherine Narayan   Accompanied by none         10/26/2023     4:10 PM   Patient Reported Additional Medications   Patient reports taking the following new medications none       HPI     Medication Followup of Celexa  Taking Medication as prescribed: yes  Side Effects:  None  Medication Helping Symptoms:  yes but patient feel that it needs to be increased  Hypothyroidism Follow-up    Since last visit, patient describes the following symptoms: Weight stable, no hair loss, no skin changes, no constipation, no loose stools  Bladder control    How many servings of fruits and vegetables do you eat daily?  0-1  On average, how many sweetened beverages do you drink each day (Examples: soda, juice, sweet tea, etc.  Do NOT count diet or artificially  sweetened beverages)?   0  How many days per week do you exercise enough to make your heart beat faster? 5  How many minutes a day do you exercise enough to make your heart beat faster? 60 or more  How many days per week do you miss taking your medication? 0      Review of Systems   Constitutional, HEENT, cardiovascular, pulmonary, gi and gu systems are negative, except as otherwise noted.      Objective    /68 (BP Location: Right arm, Patient Position: Sitting, Cuff Size: Adult Regular)   Pulse 85   Temp 97.7  F (36.5  C) (Tympanic)   SpO2 95%   There is no height or weight on file to calculate BMI.  Physical Exam   GENERAL: healthy, alert and no distress  EYES: Eyes grossly normal to inspection, PERRL and conjunctivae and sclerae normal  HENT: ear canals and TM's normal, nose and mouth without ulcers or lesions  NECK: no adenopathy, no asymmetry, masses, or scars and thyroid normal to palpation  RESP: lungs clear to auscultation - no rales, rhonchi or wheezes  CV: regular rate and rhythm, normal S1 S2, no S3 or S4, no murmur, click or rub, no peripheral edema and peripheral pulses strong  ABDOMEN: soft, nontender, no hepatosplenomegaly, no masses and bowel sounds normal  MS: no gross musculoskeletal defects noted, no edema    Office Visit on 09/22/2023   Component Date Value Ref Range Status    Color Urine 09/22/2023 Yellow  Colorless, Straw, Light Yellow, Yellow Final    Appearance Urine 09/22/2023 Slightly Cloudy (A)  Clear Final    Glucose Urine 09/22/2023 Negative  Negative mg/dL Final    Bilirubin Urine 09/22/2023 Negative  Negative Final    Ketones Urine 09/22/2023 Negative  Negative mg/dL Final    Specific Gravity Urine 09/22/2023 1.013  1.003 - 1.035 Final    Blood Urine 09/22/2023 Negative  Negative Final    pH Urine 09/22/2023 6.0  4.7 - 8.0 Final    Protein Albumin Urine 09/22/2023 Negative  Negative mg/dL Final    Urobilinogen Urine 09/22/2023 Normal  Normal, 2.0 mg/dL Final    Nitrite Urine  09/22/2023 Positive (A)  Negative Final    Leukocyte Esterase Urine 09/22/2023 Large (A)  Negative Final    Bacteria Urine 09/22/2023 Few (A)  None Seen /HPF Final    Mucus Urine 09/22/2023 Present (A)  None Seen /LPF Final    RBC Urine 09/22/2023 5 (H)  <=2 /HPF Final    WBC Urine 09/22/2023 19 (H)  <=5 /HPF Final    Squamous Epithelials Urine 09/22/2023 3 (H)  <=1 /HPF Final    Estimated Average Glucose 09/22/2023 123  mg/dL Final    Hemoglobin A1C 09/22/2023 5.9 (H)  <5.7 % Final    Normal <5.7%   Prediabetes 5.7-6.4%    Diabetes 6.5% or higher     Note: Adopted from ADA consensus guidelines.    Culture 09/22/2023 >100,000 CFU/mL Escherichia coli (A)   Final

## 2023-10-31 ENCOUNTER — TELEPHONE (OUTPATIENT)
Dept: FAMILY MEDICINE | Facility: OTHER | Age: 73
End: 2023-10-31

## 2023-10-31 DIAGNOSIS — G47.411 NARCOLEPSY AND CATAPLEXY: ICD-10-CM

## 2023-10-31 NOTE — TELEPHONE ENCOUNTER
Adderall       Last Written Prescription Date:  09/06/2023  Last Fill Quantity: 60,   # refills: 0  Last Office Visit: 10/26/2023  Future Office visit:    Next 5 appointments (look out 90 days)      Nov 15, 2023  9:15 AM  (Arrive by 9:00 AM)  SHORT with RADHAMES Avendaño  [facility] [place] ([facility] [place] ) [address]  [place] [address]  [phone]         =

## 2023-10-31 NOTE — TELEPHONE ENCOUNTER
10:47 AM    Reason for Call: Phone Call    Description: please fax referral for Urology to [facility]    Was an appointment offered for this call? No  If yes : Appointment type              Date    Preferred method for responding to this message: Telephone Call  What is your phone number ? [phone]     If we cannot reach you directly, may we leave a detailed response at the number you provided? Yes    Can this message wait until your PCP/provider returns, if available today? Cate Ramey

## 2023-11-01 RX ORDER — DEXTROAMPHETAMINE SACCHARATE, AMPHETAMINE ASPARTATE, DEXTROAMPHETAMINE SULFATE AND AMPHETAMINE SULFATE 5; 5; 5; 5 MG/1; MG/1; MG/1; MG/1
TABLET ORAL
Qty: 60 TABLET | Refills: 0 | Status: SHIPPED | OUTPATIENT
Start: 2023-11-01

## 2023-11-07 ENCOUNTER — TELEPHONE (OUTPATIENT)
Dept: FAMILY MEDICINE | Facility: OTHER | Age: 73
End: 2023-11-07

## 2023-11-07 NOTE — TELEPHONE ENCOUNTER
Patient is calling in regards to her Urology department referral.Patient is wondering if the information could be refaxed as it was not received.Records look they were faxed on 10/31/23.

## 2023-11-14 ENCOUNTER — MYC REFILL (OUTPATIENT)
Dept: FAMILY MEDICINE | Facility: OTHER | Age: 73
End: 2023-11-14

## 2023-11-14 DIAGNOSIS — J45.901 MILD ASTHMA WITH ACUTE EXACERBATION, UNSPECIFIED WHETHER PERSISTENT: ICD-10-CM

## 2023-11-14 DIAGNOSIS — G47.411 NARCOLEPSY AND CATAPLEXY: ICD-10-CM

## 2023-11-14 RX ORDER — DEXTROAMPHETAMINE SACCHARATE, AMPHETAMINE ASPARTATE, DEXTROAMPHETAMINE SULFATE AND AMPHETAMINE SULFATE 5; 5; 5; 5 MG/1; MG/1; MG/1; MG/1
TABLET ORAL
Qty: 60 TABLET | Refills: 0 | Status: CANCELLED | OUTPATIENT
Start: 2023-11-14

## 2023-11-15 RX ORDER — IPRATROPIUM BROMIDE AND ALBUTEROL SULFATE 2.5; .5 MG/3ML; MG/3ML
SOLUTION RESPIRATORY (INHALATION)
Qty: 90 ML | Refills: 3 | Status: SHIPPED | OUTPATIENT
Start: 2023-11-15

## 2023-11-20 ENCOUNTER — TELEPHONE (OUTPATIENT)
Dept: FAMILY MEDICINE | Facility: OTHER | Age: 73
End: 2023-11-20

## 2023-11-20 NOTE — TELEPHONE ENCOUNTER
12:03 PM    Reason for Call: OVERBOOK    Patient is having the following symptoms: Patient needs to be seen for preop/[facility]/12/14/23/mastopexy/rishavy  weeks.    The patient is requesting an appointment for Overbook with Cindy Danielle.    Was an appointment offered for this call? No  If yes : Appointment type              Date    Preferred method for responding to this message: Telephone Call  What is your phone number ?  [phone]    If we cannot reach you directly, may we leave a detailed response at the number you provided? Yes    Can this message wait until your PCP/provider returns, if unavailable today? Provider is in today    Linda Buenrostro

## 2023-11-21 DIAGNOSIS — G25.81 RESTLESS LEGS SYNDROME: ICD-10-CM

## 2023-11-24 RX ORDER — PRAMIPEXOLE DIHYDROCHLORIDE 0.5 MG/1
TABLET ORAL
Qty: 90 TABLET | Refills: 0 | Status: SHIPPED | OUTPATIENT
Start: 2023-11-24 | End: 2024-03-05

## 2023-11-24 NOTE — TELEPHONE ENCOUNTER
Pramipexole (MIRAPEX) 0.5 mg tab      Last Written Prescription Date:  6/21/23  Last Fill Quantity: 90,   # refills: 0  Last Office Visit: 11/15/23  Future Office visit:    Next 5 appointments (look out 90 days)      Dec 06, 2023  1:00 PM  (Arrive by 12:45 PM)  Pre-Op physical with RADHAMES Avendaño  [facility] [place] ([facility] [place] ) [address]  [place] [address]  [phone]

## 2023-12-01 ENCOUNTER — TELEPHONE (OUTPATIENT)
Dept: FAMILY MEDICINE | Facility: OTHER | Age: 73
End: 2023-12-01

## 2023-12-01 NOTE — TELEPHONE ENCOUNTER
To: Cindy Danielle    Patient called to let us know that she will have a clinic visit with her doctor in [place] on Dec 6th therefore needs to reschedule the PRE OP date with you.  Please return call to find new date/time.  Phone is [phone]     Thank you

## 2023-12-04 ENCOUNTER — OFFICE VISIT (OUTPATIENT)
Dept: FAMILY MEDICINE | Facility: OTHER | Age: 73
End: 2023-12-04
Attending: FAMILY MEDICINE
Payer: COMMERCIAL

## 2023-12-04 VITALS
OXYGEN SATURATION: 98 % | TEMPERATURE: 96.7 F | SYSTOLIC BLOOD PRESSURE: 132 MMHG | HEART RATE: 72 BPM | HEIGHT: 64 IN | RESPIRATION RATE: 16 BRPM | DIASTOLIC BLOOD PRESSURE: 72 MMHG | WEIGHT: 132 LBS | BODY MASS INDEX: 22.53 KG/M2

## 2023-12-04 DIAGNOSIS — J45.20 MILD INTERMITTENT ASTHMA WITHOUT COMPLICATION: Chronic | ICD-10-CM

## 2023-12-04 DIAGNOSIS — G25.81 RESTLESS LEGS SYNDROME (RLS): ICD-10-CM

## 2023-12-04 DIAGNOSIS — Z01.818 PREOPERATIVE EXAMINATION: Primary | ICD-10-CM

## 2023-12-04 DIAGNOSIS — R46.89 CONCERN ABOUT APPEARANCE OF BREAST: ICD-10-CM

## 2023-12-04 DIAGNOSIS — G47.419 NARCOLEPSY WITHOUT CATAPLEXY(347.00): Chronic | ICD-10-CM

## 2023-12-04 DIAGNOSIS — F33.41 RECURRENT MAJOR DEPRESSIVE DISORDER, IN PARTIAL REMISSION (H): Chronic | ICD-10-CM

## 2023-12-04 LAB
ALBUMIN SERPL BCG-MCNC: 4.3 G/DL (ref 3.5–5.2)
ALP SERPL-CCNC: 100 U/L (ref 40–150)
ALT SERPL W P-5'-P-CCNC: 18 U/L (ref 0–50)
ANION GAP SERPL CALCULATED.3IONS-SCNC: 9 MMOL/L (ref 7–15)
AST SERPL W P-5'-P-CCNC: 23 U/L (ref 0–45)
BASOPHILS # BLD AUTO: 0.1 10E3/UL (ref 0–0.2)
BASOPHILS NFR BLD AUTO: 1 %
BILIRUB SERPL-MCNC: 0.5 MG/DL
BUN SERPL-MCNC: 22 MG/DL (ref 8–23)
CALCIUM SERPL-MCNC: 9.8 MG/DL (ref 8.8–10.2)
CHLORIDE SERPL-SCNC: 99 MMOL/L (ref 98–107)
CREAT SERPL-MCNC: 0.8 MG/DL (ref 0.51–0.95)
DEPRECATED HCO3 PLAS-SCNC: 28 MMOL/L (ref 22–29)
EGFRCR SERPLBLD CKD-EPI 2021: 76 ML/MIN/1.73M2
EOSINOPHIL # BLD AUTO: 0.1 10E3/UL (ref 0–0.7)
EOSINOPHIL NFR BLD AUTO: 1 %
ERYTHROCYTE [DISTWIDTH] IN BLOOD BY AUTOMATED COUNT: 12.6 % (ref 10–15)
GLUCOSE SERPL-MCNC: 120 MG/DL (ref 70–99)
HCT VFR BLD AUTO: 42.1 % (ref 35–47)
HGB BLD-MCNC: 14.2 G/DL (ref 11.7–15.7)
IMM GRANULOCYTES # BLD: 0 10E3/UL
IMM GRANULOCYTES NFR BLD: 0 %
LYMPHOCYTES # BLD AUTO: 1.7 10E3/UL (ref 0.8–5.3)
LYMPHOCYTES NFR BLD AUTO: 24 %
MCH RBC QN AUTO: 31.1 PG (ref 26.5–33)
MCHC RBC AUTO-ENTMCNC: 33.7 G/DL (ref 31.5–36.5)
MCV RBC AUTO: 92 FL (ref 78–100)
MONOCYTES # BLD AUTO: 0.5 10E3/UL (ref 0–1.3)
MONOCYTES NFR BLD AUTO: 8 %
NEUTROPHILS # BLD AUTO: 4.8 10E3/UL (ref 1.6–8.3)
NEUTROPHILS NFR BLD AUTO: 66 %
NRBC # BLD AUTO: 0 10E3/UL
NRBC BLD AUTO-RTO: 0 /100
PLATELET # BLD AUTO: 334 10E3/UL (ref 150–450)
POTASSIUM SERPL-SCNC: 3.6 MMOL/L (ref 3.4–5.3)
PROT SERPL-MCNC: 7.2 G/DL (ref 6.4–8.3)
RBC # BLD AUTO: 4.56 10E6/UL (ref 3.8–5.2)
SODIUM SERPL-SCNC: 136 MMOL/L (ref 135–145)
WBC # BLD AUTO: 7.2 10E3/UL (ref 4–11)

## 2023-12-04 PROCEDURE — 93010 ELECTROCARDIOGRAM REPORT: CPT | Mod: 77 | Performed by: INTERNAL MEDICINE

## 2023-12-04 PROCEDURE — 85025 COMPLETE CBC W/AUTO DIFF WBC: CPT | Mod: ZL | Performed by: FAMILY MEDICINE

## 2023-12-04 PROCEDURE — 99214 OFFICE O/P EST MOD 30 MIN: CPT | Performed by: FAMILY MEDICINE

## 2023-12-04 PROCEDURE — 80053 COMPREHEN METABOLIC PANEL: CPT | Mod: ZL | Performed by: FAMILY MEDICINE

## 2023-12-04 PROCEDURE — G0463 HOSPITAL OUTPT CLINIC VISIT: HCPCS

## 2023-12-04 PROCEDURE — G0463 HOSPITAL OUTPT CLINIC VISIT: HCPCS | Mod: 25

## 2023-12-04 PROCEDURE — 93005 ELECTROCARDIOGRAM TRACING: CPT | Performed by: FAMILY MEDICINE

## 2023-12-04 PROCEDURE — 36415 COLL VENOUS BLD VENIPUNCTURE: CPT | Mod: ZL | Performed by: FAMILY MEDICINE

## 2023-12-04 ASSESSMENT — PAIN SCALES - GENERAL: PAINLEVEL: NO PAIN (0)

## 2023-12-04 NOTE — PROGRESS NOTES
[facility] [place]  [address]  [facility] [address]  Phone: [phone]  Primary Provider: Cindy Danielle  Pre-op Performing Provider: ADRIAN DUDLEY      PREOPERATIVE EVALUATION:  Today's date: 12/4/2023    Ana Maria is a 73 year old, presenting for the following:  Pre-Op Exam (Mastopexy)        Surgical Information:  Surgery/Procedure: Mastopexy  Surgery Location: [facility]  Surgeon: Dr. Parish  Surgery Date: 12/14/23  Time of Surgery: TBD  Where patient plans to recover: At home with family  Fax number for surgical facility:     Assessment & Plan     The proposed surgical procedure is considered INTERMEDIATE risk.    Preoperative examination  - EKG 12-lead complete w/read - (Clinic Performed)  - CBC with platelets and differential  - Comprehensive metabolic panel    Concern about appearance of breast    Narcolepsy without 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)    Restless legs syndrome (RLS)    Recurrent major depressive disorder, in partial remission (H24)    Mild intermittent asthma without complication              - No identified additional risk factors other than previously addressed    Antiplatelet or Anticoagulation Medication Instructions:   - Patient is on no antiplatelet or anticoagulation medications.    Additional Medication Instructions:  Hold hydrochlorothiazide morning of procedure.  Hold stimulant morning of procedure.      RECOMMENDATION:  APPROVAL GIVEN to proceed with proposed procedure, without further diagnostic evaluation.      Subjective       HPI related to upcoming procedure: preop breast surgery        12/3/2023     4:21 PM   Preop Questions   1. Have you ever had a heart attack or stroke? No   2. Have you ever had surgery on your heart or blood vessels, such as a stent placement, a coronary artery bypass, or surgery on an artery in your head, neck, heart, or legs? No   3. Do you have chest pain with activity? No   4. Do you have a history of  heart failure? No   5. Do you  currently have a cold, bronchitis or symptoms of other infection? No   6. Do you have a cough, shortness of breath, or wheezing? No   7. Do you or anyone in your family have previous history of blood clots? No   8. Do you or does anyone in your family have a serious bleeding problem such as prolonged bleeding following surgeries or cuts? No   9. Have you ever had problems with anemia or been told to take iron pills? No   10. Have you had any abnormal blood loss such as black, tarry or bloody stools, or abnormal vaginal bleeding? No   11. Have you ever had a blood transfusion? No   12. Are you willing to have a blood transfusion if it is medically needed before, during, or after your surgery? Yes   13. Have you or any of your relatives ever had problems with anesthesia? No   14. Do you have sleep apnea, excessive snoring or daytime drowsiness? YES - Excessive Snoring   14a. Do you have a CPAP machine? No   15. Do you have any artifical heart valves or other implanted medical devices like a pacemaker, defibrillator, or continuous glucose monitor? No   16. Do you have artificial joints? No   17. Are you allergic to latex? No       Health Care Directive:  Patient has a Health Care Directive on file      Preoperative Review of :   reviewed - controlled substances reflected in medication list.      Status of Chronic Conditions:  Depression/Anxiety - after  passed  Narcolepsy - on Adderall and Provigil (weaning from Adderall to Provigil (sees [facility])    Restless Legs - on Mirapex  Takes Ambien a couple times per week  Tramadol reportedly for her Fibromyalgia - a couple times per week  HLP  Asthma - prednisone prn flares in spring  Hydrochlorothiazide prn swelling - doesn't take very often      Review of Systems   Constitutional:  Negative for fever.   Respiratory:  Negative for shortness of breath.    Cardiovascular:  Negative for chest pain, palpitations and peripheral edema.   Neurological:   Negative for light-headedness and headaches.         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     Priority: Medium    Other secondary scoliosis, thoracolumbar region 06/24/2021     Priority: Medium    Mixed stress and urge urinary incontinence 12/26/2018     Priority: Medium    Leg cramps 12/26/2018     Priority: Medium    Vitamin D deficiency disease 12/26/2018     Priority: Medium    Major depressive disorder 01/16/2015     Priority: Medium    Insomnia 08/27/2014     Priority: Medium    Glucose intolerance (impaired glucose tolerance) 11/14/2013     Priority: Medium    Narcolepsy without 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) 07/31/2013     Priority: Medium    Complete rupture of rotator cuff 03/09/2009     Priority: Medium    Glucose intolerance 10/16/2008     Priority: Medium     Overview:   IMO Update 10/11      Esophageal reflux 03/26/2008     Priority: Medium    Pain in joint, ankle and foot 08/21/2007     Priority: Medium     Overview:   IMO Update 10/11      Open bimalleolar fracture 08/16/2007     Priority: Medium    Myalgia and myositis 05/01/2007     Priority: Medium     Overview:   IMO Update 10/11      Other malaise and fatigue 05/01/2007     Priority: Medium    Fibromyalgia 09/09/1984     Priority: Medium      Past Medical History:   Diagnosis Date    Arthritis     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 02/22/2011    COPD (chronic obstructive pulmonary disease) (H)     Depression 01/01/2011    Esophageal reflux 02/22/2011    Fibromyalgia 03/18/2005    Insomnia 08/27/2014    Irritable bowel syndrome 02/22/2011    has mild prolapse last echo 9/06    Menopausal or female  climacteric states 02/21/2007    Mitral valve disorders(424.0) 02/22/2011    has mild prolapse last echo 9/06    Myalgia and myositis, unspecified 02/25/2008    Narcolepsy     Need for prophylactic hormone replacement therapy (postmenopausal)     Other affections of shoulder region, not elsewhere 10/01/2008    Other follow-up examination(V67.59) 03/18/2005    Other screening mammogram 09/29/2006    Other specified pre-operative examination 05/04/2005    Posttraumatic stress disorder 02/22/2011    Restless legs syndrome (RLS) 02/22/2011    Sebaceous cyst 08/19/2005    Tourette's disorder 02/22/2011    Uncomplicated asthma     Ventral hernia 09/15/2011     Past Surgical History:   Procedure Laterality Date    BILATERAL CATARACT EXTRACTION  01/01/2006 01/01/2011    Bunion Surgery > RT  01/01/2010    COLONOSCOPY  12/23/2013    Procedure: COLONOSCOPY;  COLONOSCOPY;  Surgeon: Kasia Enrique DO;  Location: [facility] OR    D&C  01/01/1991 01/01/2011    deviated septum repair  01/01/2012    ECHOCARDIOGRAM  01/01/2006 01/01/2011    ENT SURGERY      deviated septum    EYE SURGERY      Cataracts    GYN SURGERY      HEMORRHOIDECTOMY  01/01/1979 01/01/2011    HYSTERECTOMY  01/01/1995 01/01/2011    LAPAROSCOPIC HERNIORRHAPHY VENTRAL  01/16/2014    Procedure: LAPAROSCOPIC HERNIORRHAPHY VENTRAL;  LAPAROSCOPIC VENTRAL HERNIA REPAIR W/ MESH;  Surgeon: Kasia Enrique DO;  Location: [facility] OR    leg repair after fx  >LT  01/01/2009    RELEASE CARPAL TUNNEL  01/01/2010    RT    TRIGGER  THUMB RELEASE  01/01/2011     Current Outpatient Medications   Medication Sig Dispense Refill    albuterol (PROAIR HFA/PROVENTIL HFA/VENTOLIN HFA) 108 (90 Base) MCG/ACT inhaler INHALE 2 PUFFS INTO THE LUNGS EVERY 4 HOURS AS NEEDED FOR SHORTNESS OF BREATH/DYSPNEA. 8.5 g 0    amphetamine-dextroamphetamine (ADDERALL) 20 MG tablet TAKE 2 TABLETS BY MOUTH IN THE MORNING. TO AVOID INSOMNIA, LAST DAILY DOSE SHOULD BE TAKEN NO LESS THAN 6 HOURS BEFORE  BED. 60 tablet 0    cholecalciferol (VITAMIN D3) 1250 mcg (08964 units) capsule       citalopram (CELEXA) 20 MG tablet Take 1 tablet (20 mg) by mouth daily 30 tablet 3    clindamycin (CLEOCIN) 2 % vaginal cream Place 1 applicator vaginally At Bedtime 80 g 0    cyclobenzaprine (FLEXERIL) 5 MG tablet Take 1 tablet (5 mg) by mouth 3 times daily as needed for muscle spasms 90 tablet 3    diazepam (VALIUM) 2 MG tablet Take 2 mg by mouth every 12 hours as needed (vertigo)      estradiol (ESTRACE) 0.1 MG/GM vaginal cream Place 2 g vaginally twice a week 42.5 g 1    hydrochlorothiazide (HYDRODIURIL) 50 MG tablet TAKE 1 TABLET DAILY BY MOUTH AS NEEDED 90 tablet 2    ipratropium - albuterol 0.5 mg/2.5 mg/3 mL (DUONEB) 0.5-2.5 (3) MG/3ML neb solution NEBULIZE CONTENTS OF 1 VIALEVERY 6 HOURS AS NEEDED FORSHORTNESS OF BREATH OR WHEEZING 90 mL 3    modafinil (PROVIGIL) 200 MG tablet       Multiple Vitamins-Minerals (PRESERVISION AREDS 2) CAPS Take 1 capful by mouth daily      oxyBUTYnin ER (DITROPAN XL) 10 MG 24 hr tablet Take 1 tablet (10 mg) by mouth daily 30 tablet 1    polyethylene glycol (MIRALAX) 17 GM/Dose powder Take 17 g (1 capful) by mouth 2 times daily 850 g 3    pramipexole (MIRAPEX) 0.5 MG tablet TAKE 1 TABLET BY MOUTH EVERY EVENING 90 tablet 0    predniSONE (DELTASONE) 20 MG tablet TAKE 2 TABLETS BY MOUTH EVERY MORNING FOR 7 DAYS, 1 TABLET EVERY MORNING FOR 7 DAYS, THEN STOP 21 tablet 0    simvastatin (ZOCOR) 10 MG tablet Take 1 tablet (10 mg) by mouth At Bedtime 90 tablet 1    traMADol (ULTRAM) 50 MG tablet TAKE 1 TO 2 TABLETS BY MOUTH EVERY 6 HOURS AS NEEDED FOR MODERATE PAIN 60 tablet 0    triamcinolone (KENALOG) 0.1 % external ointment Apply topically every evening Do not use for more than 14 days. Apply to affected area in vulvar region. 30 g 0    zolpidem (AMBIEN) 5 MG tablet TAKE 1 TABLET (5 MG) BY MOUTH NIGHTLY AS NEEDED FOR SLEEP 30 tablet 3       Allergies   Allergen Reactions    Acetaminophen       "Darvocet-N 100      Aluminum     Clonidine      Dropped BP Drastically    Codamine Itching and Other (See Comments)     \"Buzzy\"    Codeine     Hydrocodone      \"Patient states can take liquid med but not pill\".    Meperidine     Oxycodone-Aspirin Itching, Nausea and Vomiting and Other (See Comments)     \"Buzzy\"    Pimozide Other (See Comments)     Drowsiness    Pregabalin Headache, Itching and Swelling    Propoxyphene Napsylate      Darvocet-N 100    Seasonal Allergies Other (See Comments)     Seasonal allergies.  Per 7/3/07, runny nose, stuffy, plugged ears.        Social History     Tobacco Use    Smoking status: Never    Smokeless tobacco: Never   Substance Use Topics    Alcohol use: Not Currently     Comment: will occassionally have glass of wine during Holidays       History   Drug Use Unknown         Objective     /72   Pulse 72   Temp (!) 96.7  F (35.9  C) (Tympanic)   Resp 16   Ht 1.613 m (5' 3.5\")   Wt 59.9 kg (132 lb)   SpO2 98%   BMI 23.02 kg/m      Physical Exam  Constitutional:       General: She is not in acute distress.     Appearance: Normal appearance.   HENT:      Head: Normocephalic and atraumatic.      Mouth/Throat:      Mouth: Mucous membranes are moist.      Pharynx: Oropharynx is clear.   Eyes:      Conjunctiva/sclera: Conjunctivae normal.      Pupils: Pupils are equal, round, and reactive to light.   Neck:      Vascular: No carotid bruit.   Cardiovascular:      Rate and Rhythm: Normal rate and regular rhythm.      Heart sounds: Normal heart sounds. No murmur heard.  Pulmonary:      Effort: Pulmonary effort is normal.      Breath sounds: Normal breath sounds.   Abdominal:      General: Bowel sounds are normal.      Palpations: Abdomen is soft.      Tenderness: There is no abdominal tenderness.   Musculoskeletal:      Cervical back: Normal range of motion.      Right lower leg: No edema.      Left lower leg: No edema.   Lymphadenopathy:      Cervical: No cervical adenopathy. "   Neurological:      Mental Status: She is alert and oriented to person, place, and time.           Recent Labs   Lab Test 09/22/23  1222 06/21/23  0831 07/25/22  1346 06/02/22  1732   HGB  --  12.9  --  13.6   PLT  --  293  --  313   NA  --  137  --  139   POTASSIUM  --  3.9  --  3.7   CR  --  0.81  --  0.86   A1C 5.9*  --  5.9  --         Diagnostics:  Recent Results (from the past 168 hour(s))   EKG 12-lead complete w/read - (Clinic Performed)    Collection Time: 12/04/23  2:36 PM   Result Value Ref Range    Systolic Blood Pressure  mmHg    Diastolic Blood Pressure  mmHg    Ventricular Rate 63 BPM    Atrial Rate 63 BPM    NM Interval 168 ms    QRS Duration 78 ms     ms    QTc 444 ms    P Axis 68 degrees    R AXIS 48 degrees    T Axis 52 degrees    Interpretation ECG       Sinus rhythm  Normal ECG  When compared with ECG of 02-JUN-2022 17:33,  No significant change was found  Confirmed by MD Tere, Seth ([id number]) on 12/6/2023 1:40:16 PM     Comprehensive metabolic panel    Collection Time: 12/04/23  2:45 PM   Result Value Ref Range    Sodium 136 135 - 145 mmol/L    Potassium 3.6 3.4 - 5.3 mmol/L    Carbon Dioxide (CO2) 28 22 - 29 mmol/L    Anion Gap 9 7 - 15 mmol/L    Urea Nitrogen 22.0 8.0 - 23.0 mg/dL    Creatinine 0.80 0.51 - 0.95 mg/dL    GFR Estimate 76 >60 mL/min/1.73m2    Calcium 9.8 8.8 - 10.2 mg/dL    Chloride 99 98 - 107 mmol/L    Glucose 120 (H) 70 - 99 mg/dL    Alkaline Phosphatase 100 40 - 150 U/L    AST 23 0 - 45 U/L    ALT 18 0 - 50 U/L    Protein Total 7.2 6.4 - 8.3 g/dL    Albumin 4.3 3.5 - 5.2 g/dL    Bilirubin Total 0.5 <=1.2 mg/dL   CBC with platelets and differential    Collection Time: 12/04/23  2:45 PM   Result Value Ref Range    WBC Count 7.2 4.0 - 11.0 10e3/uL    RBC Count 4.56 3.80 - 5.20 10e6/uL    Hemoglobin 14.2 11.7 - 15.7 g/dL    Hematocrit 42.1 35.0 - 47.0 %    MCV 92 78 - 100 fL    MCH 31.1 26.5 - 33.0 pg    MCHC 33.7 31.5 - 36.5 g/dL    RDW 12.6 10.0 - 15.0 %     Platelet Count 334 150 - 450 10e3/uL    % Neutrophils 66 %    % Lymphocytes 24 %    % Monocytes 8 %    % Eosinophils 1 %    % Basophils 1 %    % Immature Granulocytes 0 %    NRBCs per 100 WBC 0 <1 /100    Absolute Neutrophils 4.8 1.6 - 8.3 10e3/uL    Absolute Lymphocytes 1.7 0.8 - 5.3 10e3/uL    Absolute Monocytes 0.5 0.0 - 1.3 10e3/uL    Absolute Eosinophils 0.1 0.0 - 0.7 10e3/uL    Absolute Basophils 0.1 0.0 - 0.2 10e3/uL    Absolute Immature Granulocytes 0.0 <=0.4 10e3/uL    Absolute NRBCs 0.0 10e3/uL        EKG:  NSR rate 63              Signed Electronically by: ADRIAN DUDLEY DO  Copy of this evaluation report is provided to requesting physician.

## 2023-12-06 LAB
ATRIAL RATE - MUSE: 63 BPM
DIASTOLIC BLOOD PRESSURE - MUSE: NORMAL MMHG
INTERPRETATION ECG - MUSE: NORMAL
P AXIS - MUSE: 68 DEGREES
PR INTERVAL - MUSE: 168 MS
QRS DURATION - MUSE: 78 MS
QT - MUSE: 434 MS
QTC - MUSE: 444 MS
R AXIS - MUSE: 48 DEGREES
SYSTOLIC BLOOD PRESSURE - MUSE: NORMAL MMHG
T AXIS - MUSE: 52 DEGREES
VENTRICULAR RATE- MUSE: 63 BPM

## 2023-12-10 ASSESSMENT — ENCOUNTER SYMPTOMS
LIGHT-HEADEDNESS: 0
PALPITATIONS: 0
HEADACHES: 0
FEVER: 0
SHORTNESS OF BREATH: 0

## 2023-12-12 ENCOUNTER — TELEPHONE (OUTPATIENT)
Dept: FAMILY MEDICINE | Facility: OTHER | Age: 73
End: 2023-12-12

## 2023-12-26 DIAGNOSIS — I63.81 CEREBROVASCULAR ACCIDENT (CVA) DUE TO OCCLUSION OF SMALL ARTERY (H): ICD-10-CM

## 2023-12-26 RX ORDER — SIMVASTATIN 10 MG
10 TABLET ORAL AT BEDTIME
Qty: 90 TABLET | Refills: 0 | Status: SHIPPED | OUTPATIENT
Start: 2023-12-26 | End: 2024-08-11

## 2024-03-05 DIAGNOSIS — G25.81 RESTLESS LEGS SYNDROME: ICD-10-CM

## 2024-03-05 RX ORDER — PRAMIPEXOLE DIHYDROCHLORIDE 0.5 MG/1
TABLET ORAL
Qty: 90 TABLET | Refills: 0 | Status: SHIPPED | OUTPATIENT
Start: 2024-03-05 | End: 2024-06-17

## 2024-03-13 ENCOUNTER — OFFICE VISIT (OUTPATIENT)
Dept: OTOLARYNGOLOGY | Facility: OTHER | Age: 74
End: 2024-03-13
Attending: NURSE PRACTITIONER
Payer: MEDICARE

## 2024-03-13 VITALS
DIASTOLIC BLOOD PRESSURE: 66 MMHG | HEIGHT: 64 IN | WEIGHT: 133 LBS | OXYGEN SATURATION: 98 % | SYSTOLIC BLOOD PRESSURE: 122 MMHG | BODY MASS INDEX: 22.71 KG/M2 | HEART RATE: 84 BPM | TEMPERATURE: 97 F

## 2024-03-13 DIAGNOSIS — J30.89 PERENNIAL ALLERGIC RHINITIS WITH SEASONAL VARIATION: ICD-10-CM

## 2024-03-13 DIAGNOSIS — R09.82 POST-NASAL DRAINAGE: ICD-10-CM

## 2024-03-13 DIAGNOSIS — J30.2 PERENNIAL ALLERGIC RHINITIS WITH SEASONAL VARIATION: ICD-10-CM

## 2024-03-13 DIAGNOSIS — R13.19 ESOPHAGEAL DYSPHAGIA: ICD-10-CM

## 2024-03-13 DIAGNOSIS — Z80.8 FAMILY HISTORY OF THYROID CANCER: Primary | ICD-10-CM

## 2024-03-13 DIAGNOSIS — R49.0 HOARSENESS: ICD-10-CM

## 2024-03-13 DIAGNOSIS — E01.0 THYROMEGALY: ICD-10-CM

## 2024-03-13 DIAGNOSIS — R49.0 DYSPHONIA: ICD-10-CM

## 2024-03-13 PROCEDURE — 99214 OFFICE O/P EST MOD 30 MIN: CPT | Mod: 25 | Performed by: NURSE PRACTITIONER

## 2024-03-13 PROCEDURE — 31575 DIAGNOSTIC LARYNGOSCOPY: CPT | Performed by: NURSE PRACTITIONER

## 2024-03-13 PROCEDURE — 36415 COLL VENOUS BLD VENIPUNCTURE: CPT | Mod: ZL | Performed by: NURSE PRACTITIONER

## 2024-03-13 PROCEDURE — 86003 ALLG SPEC IGE CRUDE XTRC EA: CPT | Mod: ZL | Performed by: NURSE PRACTITIONER

## 2024-03-13 PROCEDURE — 82785 ASSAY OF IGE: CPT | Mod: ZL | Performed by: NURSE PRACTITIONER

## 2024-03-13 PROCEDURE — G0463 HOSPITAL OUTPT CLINIC VISIT: HCPCS | Mod: 25

## 2024-03-13 RX ORDER — FLUTICASONE PROPIONATE 50 MCG
2 SPRAY, SUSPENSION (ML) NASAL DAILY
Qty: 16 G | Refills: 11 | Status: SHIPPED | OUTPATIENT
Start: 2024-03-13

## 2024-03-13 ASSESSMENT — PAIN SCALES - GENERAL: PAINLEVEL: NO PAIN (0)

## 2024-03-13 NOTE — LETTER
3/13/2024         RE: Anya Vicente  [address] [facility]  [place] [address]        Dear Colleague,    Thank you for referring your patient, Anya Vicente, to the [facility]. Please see a copy of my visit note below.    Otolaryngology Note         Chief Complaint:     Patient presents with:  Hoarse           History of Present Illness:     Anya Vicente is a 73 year old female seen today for voice hoarseness.      Duration 1 year  Character - stable - worse in the morning and changes   Strong family history of thyroid cancer  Dentist has told her that her lingual tonsils are enlarged  She has a metallic taste in her mouth, terrible taste.  This is present pretty much daily.   Sucking on mint lozenges makes it better  Never smoker  Alcohol - no history of heavy etoh  Water - 16 ounce of water per day (working on increasing)  Caffeine - tea, decaf coffee  History of septoplasty  She has recurrent congestion worse in winter.   She is not using any sprays  Uses netti pot   Irritated naris in the winter.   She started celexa in the past year    Sinus symptoms - occasional PND with throat clearing.   She has a constant cough  She was diagnosed with narcolepsy and treated with adderall and provigil    No history of head or neck surgery  She does have some left sided neck pain, tic in her neck     Reconstruction surgery in December, no change in voice since surgery.  Symptoms were present prior to surgery  Retired nurse -   Voice habits - her  was [place], needed to strain voice.    Her  passed away, change in how much she is talking.     She has some shortness of breath, but has been going to the gym to build up stamina    Frequent throat clearing  No hemoptysis  mild pharyngitis over the past couple of days with associated right ear pain.   She is feeling food sticking in the upper esophagus over the past month or so.    + heart burn with eating late at  night    + weight loss - 25 pounds after her  passed away.    + otalgia         Medications:     Current Outpatient Rx   Medication Sig Dispense Refill     albuterol (PROAIR HFA/PROVENTIL HFA/VENTOLIN HFA) 108 (90 Base) MCG/ACT inhaler INHALE 2 PUFFS INTO THE LUNGS EVERY 4 HOURS AS NEEDED FOR SHORTNESS OF BREATH/DYSPNEA. 8.5 g 0     amphetamine-dextroamphetamine (ADDERALL) 20 MG tablet TAKE 2 TABLETS BY MOUTH IN THE MORNING. TO AVOID INSOMNIA, LAST DAILY DOSE SHOULD BE TAKEN NO LESS THAN 6 HOURS BEFORE BED. 60 tablet 0     cholecalciferol (VITAMIN D3) 1250 mcg (38414 units) capsule        citalopram (CELEXA) 20 MG tablet Take 1 tablet (20 mg) by mouth daily 30 tablet 3     clindamycin (CLEOCIN) 2 % vaginal cream Place 1 applicator vaginally At Bedtime 80 g 0     cyclobenzaprine (FLEXERIL) 5 MG tablet Take 1 tablet (5 mg) by mouth 3 times daily as needed for muscle spasms 90 tablet 3     diazepam (VALIUM) 2 MG tablet Take 2 mg by mouth every 12 hours as needed (vertigo)       estradiol (ESTRACE) 0.1 MG/GM vaginal cream Place 2 g vaginally twice a week 42.5 g 1     fluticasone (FLONASE) 50 MCG/ACT nasal spray Spray 2 sprays into both nostrils daily 16 g 11     hydrochlorothiazide (HYDRODIURIL) 50 MG tablet TAKE 1 TABLET DAILY BY MOUTH AS NEEDED 90 tablet 2     ipratropium - albuterol 0.5 mg/2.5 mg/3 mL (DUONEB) 0.5-2.5 (3) MG/3ML neb solution NEBULIZE CONTENTS OF 1 VIALEVERY 6 HOURS AS NEEDED FORSHORTNESS OF BREATH OR WHEEZING 90 mL 3     modafinil (PROVIGIL) 200 MG tablet        Multiple Vitamins-Minerals (PRESERVISION AREDS 2) CAPS Take 1 capful by mouth daily       oxyBUTYnin ER (DITROPAN XL) 10 MG 24 hr tablet Take 1 tablet (10 mg) by mouth daily 30 tablet 1     polyethylene glycol (MIRALAX) 17 GM/Dose powder Take 17 g (1 capful) by mouth 2 times daily 850 g 3     pramipexole (MIRAPEX) 0.5 MG tablet TAKE 1 TABLET BY MOUTH EVERY EVENING 90 tablet 0     predniSONE (DELTASONE) 20 MG tablet TAKE 2 TABLETS BY  MOUTH EVERY MORNING FOR 7 DAYS, 1 TABLET EVERY MORNING FOR 7 DAYS, THEN STOP 21 tablet 0     simvastatin (ZOCOR) 10 MG tablet TAKE 1 TABLET BY MOUTH DAILY AT BEDTIME 90 tablet 0     traMADol (ULTRAM) 50 MG tablet TAKE 1 TO 2 TABLETS BY MOUTH EVERY 6 HOURS AS NEEDED FOR MODERATE PAIN 60 tablet 0     triamcinolone (KENALOG) 0.1 % external ointment Apply topically every evening Do not use for more than 14 days. Apply to affected area in vulvar region. 30 g 0     zolpidem (AMBIEN) 5 MG tablet TAKE 1 TABLET (5 MG) BY MOUTH NIGHTLY AS NEEDED FOR SLEEP 30 tablet 3            Allergies:     Allergies: Acetaminophen, Aluminum, Clonidine, Codamine, Codeine, Hydrocodone, Meperidine, Oxycodone-aspirin, Pimozide, Pregabalin, Propoxyphene napsylate, and Seasonal allergies          Past Medical History:     Past Medical History:   Diagnosis Date     Arthritis      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 02/22/2011     COPD (chronic obstructive pulmonary disease) (H)      Depression 01/01/2011     Esophageal reflux 02/22/2011     Fibromyalgia 03/18/2005     Insomnia 08/27/2014     Irritable bowel syndrome 02/22/2011    has mild prolapse last echo 9/06     Menopausal or female climacteric states 02/21/2007     Mitral valve disorders(424.0) 02/22/2011    has mild prolapse last echo 9/06     Myalgia and myositis, unspecified 02/25/2008     Narcolepsy      Need for prophylactic hormone replacement therapy (postmenopausal)      Other affections of shoulder region, not elsewhere 10/01/2008     Other follow-up examination(V67.59) 03/18/2005     Other screening mammogram 09/29/2006     Other specified pre-operative examination 05/04/2005     Posttraumatic stress disorder 02/22/2011     Restless legs syndrome (RLS) 02/22/2011     Sebaceous cyst 08/19/2005     Tourette's disorder 02/22/2011     Uncomplicated asthma      Ventral hernia 09/15/2011            Past Surgical History:     Past Surgical History:   Procedure Laterality  "Date     BILATERAL CATARACT EXTRACTION  01/01/2006 01/01/2011     Bunion Surgery > RT  01/01/2010     COLONOSCOPY  12/23/2013    Procedure: COLONOSCOPY;  COLONOSCOPY;  Surgeon: Kasia Enrique DO;  Location: [facility]     D&C  01/01/1991 01/01/2011     deviated septum repair  01/01/2012     ECHOCARDIOGRAM  01/01/2006 01/01/2011     ENT SURGERY      deviated septum     EYE SURGERY      Cataracts     GYN SURGERY       HEMORRHOIDECTOMY  01/01/1979 01/01/2011     HYSTERECTOMY  01/01/1995 01/01/2011     LAPAROSCOPIC HERNIORRHAPHY VENTRAL  01/16/2014    Procedure: LAPAROSCOPIC HERNIORRHAPHY VENTRAL;  LAPAROSCOPIC VENTRAL HERNIA REPAIR W/ MESH;  Surgeon: Kasia Enrique DO;  Location: [facility] OR     leg repair after fx  >LT  01/01/2009     RELEASE CARPAL TUNNEL  01/01/2010    RT     TRIGGER  THUMB RELEASE  01/01/2011       ENT family history reviewed         Social History:     Social History     Tobacco Use     Smoking status: Never     Smokeless tobacco: Never   Vaping Use     Vaping Use: Never used   Substance Use Topics     Alcohol use: Not Currently     Comment: will occassionally have glass of wine during Holidays     Drug use: Never            Review of Systems:     ROS: See HPI         Physical Exam:     /66   Pulse 84   Temp 97  F (36.1  C)   Ht 1.613 m (5' 3.5\")   Wt 60.3 kg (133 lb)   SpO2 98%   BMI 23.19 kg/m      General - The patient is well nourished and well developed, and appears to have good nutritional status.  Alert and oriented to person and place, answers questions and cooperates with examination appropriately.   Head and Face - Normocephalic and atraumatic, with no gross asymmetry noted.  The facial nerve is intact, with strong symmetric movements.  Voice and Breathing - The patient was breathing comfortably without the use of accessory muscles. There was no wheezing, stridor. The patients voice was clear and strong, and had appropriate pitch and quality.  Ears - External ear " normal. Canals are patent. Right tympanic membrane is intact without effusion, retraction or mass. Left tympanic membrane is intact without effusion, retraction or mass.  Eyes - Extraocular movements intact, sclera were not icteric or injected.  Mouth - Examination of the oral cavity showed pink, healthy oral mucosa. Dentition in good condition. No lesions or ulcerations noted. The tongue was mobile and midline, hypertrophic lingual tonsils noted.  No masses or lesions noted on tongue with palpation and inspection.    Throat - The walls of the oropharynx were smooth, pink, moist, symmetric, and had no lesions or ulcerations.  The tonsillar pillars and soft palate were symmetric. The uvula was midline on elevation.    Neck - Normal midline excursion of the laryngotracheal complex during swallowing.  Normal ROM of neck with active movement.  Palpation of the occipital, submental, submandibular, internal jugular chain, and supraclavicular nodes did not demonstrate any abnormal lymph nodes or masses.  The thyroid has an overall feeling of thyromegaly without discrete nodules.  She does have compression symptoms with palpation of the thyroid.  The trachea was mobile and midline.  Nose - External contour is symmetric, no gross deflection or scars.  Nasal mucosa is pink and moist with no abnormal mucus.  The septum and turbinates were evaluated with nasal speculum, no polyps, masses, or purulence noted on examination.    Attempts at mirror laryngoscopy were not possible due to gag reflex.  Therefore I proceeded with a fiberoptic examination after informed consent.  First I sprayed both sides of the nose with a mixture of lidocaine and neosynephrine.  I then passed the scope through the nasal cavity.     The nasopharynx was remarkable for pale, boggy, moderately hypertrophic appearing inferior turbinates.    The nasopharynx was mucosally covered and symmetric.  The eustachian tube openings were unobstructed.  Going further  down I had a clear view of the base of tongue which had normal appearing lingual tonsillar tissue.  The base of tongue was free of lesions, and the vallecula was open.  The epiglottis was smooth and mucosally covered.  The supraglottic larynx was then clearly visualized.  The vocal cords moved smoothly and symmetrically and were pearly white.  The pyriform sinuses were open and without mandie mass or pooling of secretions upon valsalva, and the limited view of the postcricoid region did not show any lesions.  The patient tolerated the procedure well.           Assessment and Plan:       ICD-10-CM    1. Family history of thyroid cancer  Z80.8       2. Dysphonia  R49.0 lidocaine 2%-oxymetazoline 0.025% nasal solution 2 spray      3. Esophageal dysphagia  R13.19 XR Esophagram      4. Post-nasal drainage  R09.82 fluticasone (FLONASE) 50 MCG/ACT nasal spray     Inhalent Panel [place] (Serolab)     Total IgE (Serolab)     [place]halent Panel [place] (Serolab)     Total IgE (Serolab)      5. Perennial allergic rhinitis with seasonal variation  J30.89 Inhalent Panel [place] (Serolab)    J30.2 Total IgE (Serolab)     [place]halent Panel [place] (Serolab)     Total IgE (Serolab)      6. Hoarseness  R49.0 US Thyroid      7. Thyromegaly  E01.0 US Thyroid          Qi Med Nasal Saline  Use high volume qi med saline irrigation.  Use warm distilled water and 2 packets of the salt solution that comes with the bottle, dissolve in bottle up to 240 ml dionisio.  Irrigate each side of your nose leaning over the sink, using 1/3 to 1/2 the volume of the bottle in each nostril every irrigation.  Irrigate 5 times daily and as needed.    Increase intake of non-caffeinated fluids.     Avoid throat clearing, try to hum or hard swallow instead of clearing.     fluticasone (FLONASE) 50 MCG/ACT nasal spray Spray 2 sprays into both nostrils daily     Ordered Inhalent Allergy [place] Region panel.  Go to lab to have this completed before you leave.  Nurse  will call you with results.     XR Esophagram ordered someone will call you to schedule that.  Nurse will call you with results.     Ultrasound of the thyroid someone will call you to schedule. Nurse will call you with results.     Follow up in 6-8 weeks with ENT     Katherine STEVE  [facility]    Again, thank you for allowing me to participate in the care of your patient.        Sincerely,        Katherine Marc NP

## 2024-03-13 NOTE — PROGRESS NOTES
Otolaryngology Note         Chief Complaint:     Patient presents with:  Hoarse           History of Present Illness:     Anya Vicente is a 73 year old female seen today for voice hoarseness.      Duration 1 year  Character - stable - worse in the morning and changes   Strong family history of thyroid cancer  Dentist has told her that her lingual tonsils are enlarged  She has a metallic taste in her mouth, terrible taste.  This is present pretty much daily.   Sucking on mint lozenges makes it better  Never smoker  Alcohol - no history of heavy etoh  Water - 16 ounce of water per day (working on increasing)  Caffeine - tea, decaf coffee  History of septoplasty  She has recurrent congestion worse in winter.   She is not using any sprays  Uses netti pot   Irritated naris in the winter.   She started celexa in the past year    Sinus symptoms - occasional PND with throat clearing.   She has a constant cough  She was diagnosed with narcolepsy and treated with adderall and provigil    No history of head or neck surgery  She does have some left sided neck pain, tic in her neck     Reconstruction surgery in December, no change in voice since surgery.  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    Her  passed away, change in how much she is talking.     She has some shortness of breath, but has been going to the gym to build up stamina    Frequent throat clearing  No hemoptysis  mild pharyngitis over the past couple of days with associated right ear pain.   She is feeling food sticking in the upper esophagus over the past month or so.    + heart burn with eating late at night    + weight loss - 25 pounds after her  passed away.    + otalgia         Medications:     Current Outpatient Rx   Medication Sig Dispense Refill    albuterol (PROAIR HFA/PROVENTIL HFA/VENTOLIN HFA) 108 (90 Base) MCG/ACT inhaler INHALE 2 PUFFS INTO THE  LUNGS EVERY 4 HOURS AS NEEDED FOR SHORTNESS OF BREATH/DYSPNEA. 8.5 g 0    amphetamine-dextroamphetamine (ADDERALL) 20 MG tablet TAKE 2 TABLETS BY MOUTH IN THE MORNING. TO AVOID INSOMNIA, LAST DAILY DOSE SHOULD BE TAKEN NO LESS THAN 6 HOURS BEFORE BED. 60 tablet 0    cholecalciferol (VITAMIN D3) 1250 mcg (60375 units) capsule       citalopram (CELEXA) 20 MG tablet Take 1 tablet (20 mg) by mouth daily 30 tablet 3    clindamycin (CLEOCIN) 2 % vaginal cream Place 1 applicator vaginally At Bedtime 80 g 0    cyclobenzaprine (FLEXERIL) 5 MG tablet Take 1 tablet (5 mg) by mouth 3 times daily as needed for muscle spasms 90 tablet 3    diazepam (VALIUM) 2 MG tablet Take 2 mg by mouth every 12 hours as needed (vertigo)      estradiol (ESTRACE) 0.1 MG/GM vaginal cream Place 2 g vaginally twice a week 42.5 g 1    fluticasone (FLONASE) 50 MCG/ACT nasal spray Spray 2 sprays into both nostrils daily 16 g 11    hydrochlorothiazide (HYDRODIURIL) 50 MG tablet TAKE 1 TABLET DAILY BY MOUTH AS NEEDED 90 tablet 2    ipratropium - albuterol 0.5 mg/2.5 mg/3 mL (DUONEB) 0.5-2.5 (3) MG/3ML neb solution NEBULIZE CONTENTS OF 1 VIALEVERY 6 HOURS AS NEEDED FORSHORTNESS OF BREATH OR WHEEZING 90 mL 3    modafinil (PROVIGIL) 200 MG tablet       Multiple Vitamins-Minerals (PRESERVISION AREDS 2) CAPS Take 1 capful by mouth daily      oxyBUTYnin ER (DITROPAN XL) 10 MG 24 hr tablet Take 1 tablet (10 mg) by mouth daily 30 tablet 1    polyethylene glycol (MIRALAX) 17 GM/Dose powder Take 17 g (1 capful) by mouth 2 times daily 850 g 3    pramipexole (MIRAPEX) 0.5 MG tablet TAKE 1 TABLET BY MOUTH EVERY EVENING 90 tablet 0    predniSONE (DELTASONE) 20 MG tablet TAKE 2 TABLETS BY MOUTH EVERY MORNING FOR 7 DAYS, 1 TABLET EVERY MORNING FOR 7 DAYS, THEN STOP 21 tablet 0    simvastatin (ZOCOR) 10 MG tablet TAKE 1 TABLET BY MOUTH DAILY AT BEDTIME 90 tablet 0    traMADol (ULTRAM) 50 MG tablet TAKE 1 TO 2 TABLETS BY MOUTH EVERY 6 HOURS AS NEEDED FOR MODERATE PAIN 60  tablet 0    triamcinolone (KENALOG) 0.1 % external ointment Apply topically every evening Do not use for more than 14 days. Apply to affected area in vulvar region. 30 g 0    zolpidem (AMBIEN) 5 MG tablet TAKE 1 TABLET (5 MG) BY MOUTH NIGHTLY AS NEEDED FOR SLEEP 30 tablet 3            Allergies:     Allergies: Acetaminophen, Aluminum, Clonidine, Codamine, Codeine, Hydrocodone, Meperidine, Oxycodone-aspirin, Pimozide, Pregabalin, Propoxyphene napsylate, and Seasonal allergies          Past Medical History:     Past Medical History:   Diagnosis Date    Arthritis     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 02/22/2011    COPD (chronic obstructive pulmonary disease) (H)     Depression 01/01/2011    Esophageal reflux 02/22/2011    Fibromyalgia 03/18/2005    Insomnia 08/27/2014    Irritable bowel syndrome 02/22/2011    has mild prolapse last echo 9/06    Menopausal or female climacteric states 02/21/2007    Mitral valve disorders(424.0) 02/22/2011    has mild prolapse last echo 9/06    Myalgia and myositis, unspecified 02/25/2008    Narcolepsy     Need for prophylactic hormone replacement therapy (postmenopausal)     Other affections of shoulder region, not elsewhere 10/01/2008    Other follow-up examination(V67.59) 03/18/2005    Other screening mammogram 09/29/2006    Other specified pre-operative examination 05/04/2005    Posttraumatic stress disorder 02/22/2011    Restless legs syndrome (RLS) 02/22/2011    Sebaceous cyst 08/19/2005    Tourette's disorder 02/22/2011    Uncomplicated asthma     Ventral hernia 09/15/2011            Past Surgical History:     Past Surgical History:   Procedure Laterality Date    BILATERAL CATARACT EXTRACTION  01/01/2006 01/01/2011    Bunion Surgery > RT  01/01/2010    COLONOSCOPY  12/23/2013    Procedure: COLONOSCOPY;  COLONOSCOPY;  Surgeon: Kasia Enrique DO;  Location: [facility] OR    D&C  01/01/1991 01/01/2011    deviated septum repair  01/01/2012    ECHOCARDIOGRAM   "01/01/2006 01/01/2011    ENT SURGERY      deviated septum    EYE SURGERY      Cataracts    GYN SURGERY      HEMORRHOIDECTOMY  01/01/1979 01/01/2011    HYSTERECTOMY  01/01/1995 01/01/2011    LAPAROSCOPIC HERNIORRHAPHY VENTRAL  01/16/2014    Procedure: LAPAROSCOPIC HERNIORRHAPHY VENTRAL;  LAPAROSCOPIC VENTRAL HERNIA REPAIR W/ MESH;  Surgeon: Kasia Enrique DO;  Location: [facility] OR    leg repair after fx  >LT  01/01/2009    RELEASE CARPAL TUNNEL  01/01/2010    RT    TRIGGER  THUMB RELEASE  01/01/2011       ENT family history reviewed         Social History:     Social History     Tobacco Use    Smoking status: Never    Smokeless tobacco: Never   Vaping Use    Vaping Use: Never used   Substance Use Topics    Alcohol use: Not Currently     Comment: will occassionally have glass of wine during Holidays    Drug use: Never            Review of Systems:     ROS: See HPI         Physical Exam:     /66   Pulse 84   Temp 97  F (36.1  C)   Ht 1.613 m (5' 3.5\")   Wt 60.3 kg (133 lb)   SpO2 98%   BMI 23.19 kg/m      General - The patient is well nourished and well developed, and appears to have good nutritional status.  Alert and oriented to person and place, answers questions and cooperates with examination appropriately.   Head and Face - Normocephalic and atraumatic, with no gross asymmetry noted.  The facial nerve is intact, with strong symmetric movements.  Voice and Breathing - The patient was breathing comfortably without the use of accessory muscles. There was no wheezing, stridor. The patients voice was clear and strong, and had appropriate pitch and quality.  Ears - External ear normal. Canals are patent. Right tympanic membrane is intact without effusion, retraction or mass. Left tympanic membrane is intact without effusion, retraction or mass.  Eyes - Extraocular movements intact, sclera were not icteric or injected.  Mouth - Examination of the oral cavity showed pink, healthy oral mucosa. Dentition " in good condition. No lesions or ulcerations noted. The tongue was mobile and midline, hypertrophic lingual tonsils noted.  No masses or lesions noted on tongue with palpation and inspection.    Throat - The walls of the oropharynx were smooth, pink, moist, symmetric, and had no lesions or ulcerations.  The tonsillar pillars and soft palate were symmetric. The uvula was midline on elevation.    Neck - Normal midline excursion of the laryngotracheal complex during swallowing.  Normal ROM of neck with active movement.  Palpation of the occipital, submental, submandibular, internal jugular chain, and supraclavicular nodes did not demonstrate any abnormal lymph nodes or masses.  The thyroid has an overall feeling of thyromegaly without discrete nodules.  She does have compression symptoms with palpation of the thyroid.  The trachea was mobile and midline.  Nose - External contour is symmetric, no gross deflection or scars.  Nasal mucosa is pink and moist with no abnormal mucus.  The septum and turbinates were evaluated with nasal speculum, no polyps, masses, or purulence noted on examination.    Attempts at mirror laryngoscopy were not possible due to gag reflex.  Therefore I proceeded with a fiberoptic examination after informed consent.  First I sprayed both sides of the nose with a mixture of lidocaine and neosynephrine.  I then passed the scope through the nasal cavity.     The nasopharynx was remarkable for pale, boggy, moderately hypertrophic appearing inferior turbinates.    The nasopharynx was mucosally covered and symmetric.  The eustachian tube openings were unobstructed.  Going further down I had a clear view of the base of tongue which had normal appearing lingual tonsillar tissue.  The base of tongue was free of lesions, and the vallecula was open.  The epiglottis was smooth and mucosally covered.  The supraglottic larynx was then clearly visualized.  The vocal cords moved smoothly and symmetrically and  were pearly white.  The pyriform sinuses were open and without mandie mass or pooling of secretions upon valsalva, and the limited view of the postcricoid region did not show any lesions.  The patient tolerated the procedure well.           Assessment and Plan:       ICD-10-CM    1. Family history of thyroid cancer  Z80.8       2. Dysphonia  R49.0 lidocaine 2%-oxymetazoline 0.025% nasal solution 2 spray      3. Esophageal dysphagia  R13.19 XR Esophagram      4. Post-nasal drainage  R09.82 fluticasone (FLONASE) 50 MCG/ACT nasal spray     [place]halent Panel [place] (Serolab)     Total IgE (Serolab)     [place]halent Panel [place] (Serolab)     Total IgE (Serolab)      5. Perennial allergic rhinitis with seasonal variation  J30.89 Inhalent Panel [place] (Serolab)    J30.2 Total IgE (Serolab)     [place]halent Panel [place] (Serolab)     Total IgE (Serolab)      6. Hoarseness  R49.0 US Thyroid      7. Thyromegaly  E01.0 US Thyroid          Qi Med Nasal Saline  Use high volume qi med saline irrigation.  Use warm distilled water and 2 packets of the salt solution that comes with the bottle, dissolve in bottle up to 240 ml dionisio.  Irrigate each side of your nose leaning over the sink, using 1/3 to 1/2 the volume of the bottle in each nostril every irrigation.  Irrigate 5 times daily and as needed.    Increase intake of non-caffeinated fluids.     Avoid throat clearing, try to hum or hard swallow instead of clearing.     fluticasone (FLONASE) 50 MCG/ACT nasal spray Spray 2 sprays into both nostrils daily     Ordered Inhalent Allergy [place] panel.  Go to lab to have this completed before you leave.  Nurse will call you with results.     XR Esophagram ordered someone will call you to schedule that.  Nurse will call you with results.     Ultrasound of the thyroid someone will call you to schedule. Nurse will call you with results.     Follow up in 6-8 weeks with ENT     Katherine STEVE  [facility]

## 2024-03-13 NOTE — PATIENT INSTRUCTIONS
Thank you for allowing Katherine Marc and our ENT team to participate in your care.  If your medications are too expensive, please give the nurse a call.  We can possibly change this medication.  If you have a scheduling or an appointment question please contact our Health Unit Coordinator at their direct line [phone] ext [phone].   ALL nursing questions or concerns can be directed to your ENT nurse at: [phone] - [place]     Qi Med Nasal Saline  Use high volume qi med saline irrigation.  Use warm distilled water and 2 packets of the salt solution that comes with the bottle, dissolve in bottle up to 240 ml dionisio.  Irrigate each side of your nose leaning over the sink, using 1/3 to 1/2 the volume of the bottle in each nostril every irrigation.  Irrigate 5 times daily and as needed.    Drink more water    Stop throat clearing    fluticasone (FLONASE) 50 MCG/ACT nasal spray Spray 2 sprays into both nostrils daily     Ordered Inhalent Allergy [place] Region panel.  Go to lab to have this completed before you leave.  Nurse will call you with results.     XR Esophagram ordered someone will call you to schedule that.  Nurse will call you with results.     Ultrasound of the thyroid someone will call you to schedule. Nurse will call you with results.     Follow up in 6-8 weeks with ENT

## 2024-03-18 ENCOUNTER — HOSPITAL ENCOUNTER (OUTPATIENT)
Dept: ULTRASOUND IMAGING | Facility: HOSPITAL | Age: 74
Discharge: HOME | End: 2024-03-18
Attending: NURSE PRACTITIONER | Admitting: NURSE PRACTITIONER
Payer: MEDICARE

## 2024-03-18 DIAGNOSIS — R49.0 HOARSENESS: ICD-10-CM

## 2024-03-18 DIAGNOSIS — E01.0 THYROMEGALY: ICD-10-CM

## 2024-03-18 PROCEDURE — 76536 US EXAM OF HEAD AND NECK: CPT

## 2024-03-21 ENCOUNTER — HOSPITAL ENCOUNTER (OUTPATIENT)
Dept: GENERAL RADIOLOGY | Facility: HOSPITAL | Age: 74
Discharge: HOME | End: 2024-03-21
Attending: NURSE PRACTITIONER | Admitting: NURSE PRACTITIONER
Payer: MEDICARE

## 2024-03-21 DIAGNOSIS — R13.19 ESOPHAGEAL DYSPHAGIA: ICD-10-CM

## 2024-03-21 PROCEDURE — 74220 X-RAY XM ESOPHAGUS 1CNTRST: CPT

## 2024-03-27 LAB
SCANNED LAB RESULT: NORMAL
SCANNED LAB RESULT: NORMAL

## 2024-04-01 ENCOUNTER — MYC MEDICAL ADVICE (OUTPATIENT)
Dept: FAMILY MEDICINE | Facility: OTHER | Age: 74
End: 2024-04-01

## 2024-04-01 ASSESSMENT — ANXIETY QUESTIONNAIRES
6. BECOMING EASILY ANNOYED OR IRRITABLE: SEVERAL DAYS
8. IF YOU CHECKED OFF ANY PROBLEMS, HOW DIFFICULT HAVE THESE MADE IT FOR YOU TO DO YOUR WORK, TAKE CARE OF THINGS AT HOME, OR GET ALONG WITH OTHER PEOPLE?: SOMEWHAT DIFFICULT
5. BEING SO RESTLESS THAT IT IS HARD TO SIT STILL: SEVERAL DAYS
GAD7 TOTAL SCORE: 7
1. FEELING NERVOUS, ANXIOUS, OR ON EDGE: SEVERAL DAYS
IF YOU CHECKED OFF ANY PROBLEMS ON THIS QUESTIONNAIRE, HOW DIFFICULT HAVE THESE PROBLEMS MADE IT FOR YOU TO DO YOUR WORK, TAKE CARE OF THINGS AT HOME, OR GET ALONG WITH OTHER PEOPLE: SOMEWHAT DIFFICULT
GAD7 TOTAL SCORE: 7
4. TROUBLE RELAXING: SEVERAL DAYS
7. FEELING AFRAID AS IF SOMETHING AWFUL MIGHT HAPPEN: SEVERAL DAYS
3. WORRYING TOO MUCH ABOUT DIFFERENT THINGS: SEVERAL DAYS
7. FEELING AFRAID AS IF SOMETHING AWFUL MIGHT HAPPEN: SEVERAL DAYS
GAD7 TOTAL SCORE: 7
2. NOT BEING ABLE TO STOP OR CONTROL WORRYING: SEVERAL DAYS

## 2024-04-01 ASSESSMENT — PATIENT HEALTH QUESTIONNAIRE - PHQ9
SUM OF ALL RESPONSES TO PHQ QUESTIONS 1-9: 10
SUM OF ALL RESPONSES TO PHQ QUESTIONS 1-9: 10
10. IF YOU CHECKED OFF ANY PROBLEMS, HOW DIFFICULT HAVE THESE PROBLEMS MADE IT FOR YOU TO DO YOUR WORK, TAKE CARE OF THINGS AT HOME, OR GET ALONG WITH OTHER PEOPLE: SOMEWHAT DIFFICULT

## 2024-04-02 NOTE — TELEPHONE ENCOUNTER
Patient completed [facility] PHQ-9/VALERIA-7 on 4/1/2024 for Depression Remission quality patient outreach per [facility] guidelines. Noted most current PHQ-9 total score is decreased and noted most current VALERIA-7 total score is significantly as well when compared to last completed assessments done on 11/15/2023.  Referral to [facility] was sent in September 2023 by Dr. Pereira.      Current PHQ-9 question #9 is NEGATIVE for thoughts of self-harm or SI.     Depression Screening Follow-up    Does the patient currently have a mental health provider?  Yes, patient was referred back to current mental health provider.    Chaya López RN           10/26/2023     4:16 PM 11/15/2023     9:26 AM 4/1/2024    12:35 PM   PHQ   PHQ-9 Total Score 18 15 10   Q9: Thoughts of better off dead/self-harm past 2 weeks Not at all Not at all Not at all          5/7/2023     5:40 PM 9/22/2023    11:44 AM 4/1/2024    12:36 PM   VALERIA-7 SCORE   Total Score 11 (moderate anxiety)  7 (mild anxiety)   Total Score 11 13 7      Chaya López RN

## 2024-06-17 DIAGNOSIS — G25.81 RESTLESS LEGS SYNDROME: ICD-10-CM

## 2024-06-17 RX ORDER — PRAMIPEXOLE DIHYDROCHLORIDE 0.5 MG/1
TABLET ORAL
Qty: 90 TABLET | Refills: 0 | Status: SHIPPED | OUTPATIENT
Start: 2024-06-17 | End: 2024-09-24

## 2024-06-17 NOTE — TELEPHONE ENCOUNTER
Mirapex      Last Written Prescription Date:  3/5/24  Last Fill Quantity: 90,   # refills: 0  Last Office Visit: 12/4/23  Future Office visit:    Next 5 appointments (look out 90 days)      Jul 05, 2024  2:00 PM  (Arrive by 1:45 PM)  Adult Preventative Visit with RADHAMES Avendaño  [facility] [place] ([facility] [place] ) [address]  [place] [address]  [phone]             Routing refill request to provider for review/approval because:

## 2024-06-25 ENCOUNTER — MYC MEDICAL ADVICE (OUTPATIENT)
Dept: FAMILY MEDICINE | Facility: OTHER | Age: 74
End: 2024-06-25

## 2024-06-25 ASSESSMENT — ANXIETY QUESTIONNAIRES
5. BEING SO RESTLESS THAT IT IS HARD TO SIT STILL: MORE THAN HALF THE DAYS
7. FEELING AFRAID AS IF SOMETHING AWFUL MIGHT HAPPEN: SEVERAL DAYS
3. WORRYING TOO MUCH ABOUT DIFFERENT THINGS: MORE THAN HALF THE DAYS
IF YOU CHECKED OFF ANY PROBLEMS ON THIS QUESTIONNAIRE, HOW DIFFICULT HAVE THESE PROBLEMS MADE IT FOR YOU TO DO YOUR WORK, TAKE CARE OF THINGS AT HOME, OR GET ALONG WITH OTHER PEOPLE: VERY DIFFICULT
7. FEELING AFRAID AS IF SOMETHING AWFUL MIGHT HAPPEN: SEVERAL DAYS
GAD7 TOTAL SCORE: 13
GAD7 TOTAL SCORE: 13
4. TROUBLE RELAXING: NEARLY EVERY DAY
8. IF YOU CHECKED OFF ANY PROBLEMS, HOW DIFFICULT HAVE THESE MADE IT FOR YOU TO DO YOUR WORK, TAKE CARE OF THINGS AT HOME, OR GET ALONG WITH OTHER PEOPLE?: VERY DIFFICULT
1. FEELING NERVOUS, ANXIOUS, OR ON EDGE: SEVERAL DAYS
GAD7 TOTAL SCORE: 13
2. NOT BEING ABLE TO STOP OR CONTROL WORRYING: MORE THAN HALF THE DAYS
6. BECOMING EASILY ANNOYED OR IRRITABLE: MORE THAN HALF THE DAYS

## 2024-06-25 ASSESSMENT — PATIENT HEALTH QUESTIONNAIRE - PHQ9
SUM OF ALL RESPONSES TO PHQ QUESTIONS 1-9: 10
10. IF YOU CHECKED OFF ANY PROBLEMS, HOW DIFFICULT HAVE THESE PROBLEMS MADE IT FOR YOU TO DO YOUR WORK, TAKE CARE OF THINGS AT HOME, OR GET ALONG WITH OTHER PEOPLE: VERY DIFFICULT
SUM OF ALL RESPONSES TO PHQ QUESTIONS 1-9: 10

## 2024-06-26 NOTE — TELEPHONE ENCOUNTER
Depression Screening Follow-up  Does the patient currently have a mental health provider?  Yes, patient was referred back to current mental health provider.  Chaya López RN     [facility]IA[facility]7 on 6/25/2024 for Depression Remission quality patient outreach per [facility] guidelines. Noted most current PHQ-9 total score remains unchanged and noted most current VALERIA-7 total score is increased as well when compared to last completed assessments done on 4/1/2024.       Current PHQ-9 question #9 is NEGATIVE for thoughts of self-harm or SI.     If you checked off any problems, how difficult have these problems made it for you to do your work, take care of things at home, or get along with other people? Very difficult     Patient has upcoming clinic appointment with PCP Lolis on 7/5/2024 2:00 PM (Arrive by 1:45 PM).         11/15/2023     9:26 AM 4/1/2024    12:35 PM 6/25/2024     4:20 PM   PHQ   PHQ-9 Total Score 15 10 10   Q9: Thoughts of better off dead/self-harm past 2 weeks Not at all Not at all Not at all          9/22/2023    11:44 AM 4/1/2024    12:36 PM 6/25/2024     4:22 PM   VALERIA-7 SCORE   Total Score  7 (mild anxiety) 13 (moderate anxiety)   Total Score 13 7 13      Chaya López, RN

## 2024-07-16 DIAGNOSIS — F41.1 GAD (GENERALIZED ANXIETY DISORDER): ICD-10-CM

## 2024-07-16 DIAGNOSIS — F43.21 GRIEF: ICD-10-CM

## 2024-07-16 RX ORDER — CITALOPRAM HYDROBROMIDE 20 MG/1
20 TABLET ORAL DAILY
Qty: 30 TABLET | Refills: 0 | Status: SHIPPED | OUTPATIENT
Start: 2024-07-16 | End: 2024-08-26

## 2024-07-16 NOTE — TELEPHONE ENCOUNTER
[facility] Protocol [id number]/16/2024 08:15 AM   Protocol Details VALERIA-7 score of less than 5 in past 6 months.

## 2024-07-16 NOTE — TELEPHONE ENCOUNTER
Celexa       Last Written Prescription Date:  10/26/2023  Last Fill Quantity: 30,   # refills: 3  Last Office Visit: 12/04/2023  Future Office visit:    Next 5 appointments (look out 90 days)      Sep 05, 2024 3:00 PM  (Arrive by 2:45 PM)  Adult Preventative Visit with RADHAMES Avendaño  [facility] [place] ([facility] [place] ) [address]  [place] [address]  [phone]

## 2024-08-09 DIAGNOSIS — G25.81 RESTLESS LEGS SYNDROME (RLS): ICD-10-CM

## 2024-08-09 DIAGNOSIS — I63.81 CEREBROVASCULAR ACCIDENT (CVA) DUE TO OCCLUSION OF SMALL ARTERY (H): ICD-10-CM

## 2024-08-09 NOTE — TELEPHONE ENCOUNTER
Dr. Flex SUN 12/4/23    LOV with PCP on 10/26/23    Ambien       Last Written Prescription Date:  10/25/23  Last Fill Quantity: 30,   # refills: 3  Last Office Visit: 10/26/23  Future Office visit:    Next 5 appointments (look out 90 days)      Sep 05, 2024 3:00 PM  (Arrive by 2:45 PM)  Adult Preventative Visit with RADHAMES Avendaño  [facility] [place] ([facility] [place] ) [address]  [place] [address]  [phone]             Routing refill request to provider for review/approval because:  Drug not on the [facility], P or [facility] refill protocol or controlled substance    Simvastain       Last Written Prescription Date:  12/26/23  Last Fill Quantity: 90,   # refills: 0  Last Office Visit: 10/26/23  Future Office visit:    Next 5 appointments (look out 90 days)      Sep 05, 2024 3:00 PM  (Arrive by 2:45 PM)  Adult Preventative Visit with RADHAMES Avendaño  [facility] [place] ([facility] [place] ) [address]  [place] [address]  [phone]

## 2024-08-11 RX ORDER — ZOLPIDEM TARTRATE 5 MG/1
TABLET ORAL
Qty: 30 TABLET | Refills: 0 | Status: SHIPPED | OUTPATIENT
Start: 2024-08-11

## 2024-08-11 RX ORDER — SIMVASTATIN 10 MG
10 TABLET ORAL AT BEDTIME
Qty: 90 TABLET | Refills: 0 | Status: SHIPPED | OUTPATIENT
Start: 2024-08-11

## 2024-08-26 DIAGNOSIS — F41.1 GAD (GENERALIZED ANXIETY DISORDER): ICD-10-CM

## 2024-08-26 DIAGNOSIS — F43.21 GRIEF: ICD-10-CM

## 2024-08-26 RX ORDER — CITALOPRAM HYDROBROMIDE 20 MG/1
20 TABLET ORAL DAILY
Qty: 30 TABLET | Refills: 0 | Status: SHIPPED | OUTPATIENT
Start: 2024-08-26 | End: 2024-09-24

## 2024-08-26 NOTE — TELEPHONE ENCOUNTER
CITALOPRAM HBR 20 MG TABLET       Last Written Prescription Date:  07/16/2024  Last Fill Quantity: 30,   # refills: 0  Last Office Visit: 06/21/2023  Future Office visit:    Next 5 appointments (look out 90 days)      Sep 05, 2024 3:00 PM  (Arrive by 2:45 PM)  Pre-Operative Physical with RADHAMES Avendaño  [facility] ([facility] ) [address] [place] [address]  [place] [address]  [phone]             Routing refill request to provider for review/approval because:    SSRIs Protocol [id number]/26/2024 08:57 AM   Protocol Details VALERIA-7 score of less than 5 in past 6 months.          Kimberly Boecker, RN

## 2024-09-04 ASSESSMENT — ASTHMA QUESTIONNAIRES
QUESTION_1 LAST FOUR WEEKS HOW MUCH OF THE TIME DID YOUR ASTHMA KEEP YOU FROM GETTING AS MUCH DONE AT WORK, SCHOOL OR AT HOME: NONE OF THE TIME
ACT_TOTALSCORE: 23
ACT_TOTALSCORE: 23
QUESTION_5 LAST FOUR WEEKS HOW WOULD YOU RATE YOUR ASTHMA CONTROL: COMPLETELY CONTROLLED
QUESTION_4 LAST FOUR WEEKS HOW OFTEN HAVE YOU USED YOUR RESCUE INHALER OR NEBULIZER MEDICATION (SUCH AS ALBUTEROL): ONCE A WEEK OR LESS
QUESTION_3 LAST FOUR WEEKS HOW OFTEN DID YOUR ASTHMA SYMPTOMS (WHEEZING, COUGHING, SHORTNESS OF BREATH, CHEST TIGHTNESS OR PAIN) WAKE YOU UP AT NIGHT OR EARLIER THAN USUAL IN THE MORNING: NOT AT ALL
QUESTION_2 LAST FOUR WEEKS HOW OFTEN HAVE YOU HAD SHORTNESS OF BREATH: ONCE OR TWICE A WEEK

## 2024-09-04 ASSESSMENT — PATIENT HEALTH QUESTIONNAIRE - PHQ9
SUM OF ALL RESPONSES TO PHQ QUESTIONS 1-9: 1
10. IF YOU CHECKED OFF ANY PROBLEMS, HOW DIFFICULT HAVE THESE PROBLEMS MADE IT FOR YOU TO DO YOUR WORK, TAKE CARE OF THINGS AT HOME, OR GET ALONG WITH OTHER PEOPLE: NOT DIFFICULT AT ALL
SUM OF ALL RESPONSES TO PHQ QUESTIONS 1-9: 1

## 2024-09-05 ENCOUNTER — OFFICE VISIT (OUTPATIENT)
Dept: FAMILY MEDICINE | Facility: OTHER | Age: 74
End: 2024-09-05
Attending: PHYSICIAN ASSISTANT
Payer: COMMERCIAL

## 2024-09-05 VITALS
BODY MASS INDEX: 22.71 KG/M2 | OXYGEN SATURATION: 98 % | SYSTOLIC BLOOD PRESSURE: 120 MMHG | DIASTOLIC BLOOD PRESSURE: 62 MMHG | HEART RATE: 78 BPM | TEMPERATURE: 97.8 F | HEIGHT: 64 IN | WEIGHT: 133 LBS | RESPIRATION RATE: 18 BRPM

## 2024-09-05 DIAGNOSIS — L29.2 VULVAR ITCHING: ICD-10-CM

## 2024-09-05 DIAGNOSIS — Z12.31 VISIT FOR SCREENING MAMMOGRAM: ICD-10-CM

## 2024-09-05 DIAGNOSIS — Z01.818 PREOP GENERAL PHYSICAL EXAM: Primary | ICD-10-CM

## 2024-09-05 DIAGNOSIS — N76.0 VAGINITIS AND VULVOVAGINITIS: ICD-10-CM

## 2024-09-05 DIAGNOSIS — J98.01 BRONCHOSPASM: ICD-10-CM

## 2024-09-05 LAB
ANION GAP SERPL CALCULATED.3IONS-SCNC: 7 MMOL/L (ref 7–15)
BASOPHILS # BLD AUTO: 0.1 10E3/UL (ref 0–0.2)
BASOPHILS NFR BLD AUTO: 1 %
BUN SERPL-MCNC: 18.3 MG/DL (ref 8–23)
CALCIUM SERPL-MCNC: 9.2 MG/DL (ref 8.8–10.4)
CHLORIDE SERPL-SCNC: 105 MMOL/L (ref 98–107)
CREAT SERPL-MCNC: 0.79 MG/DL (ref 0.51–0.95)
EGFRCR SERPLBLD CKD-EPI 2021: 77 ML/MIN/1.73M2
EOSINOPHIL # BLD AUTO: 0.1 10E3/UL (ref 0–0.7)
EOSINOPHIL NFR BLD AUTO: 2 %
ERYTHROCYTE [DISTWIDTH] IN BLOOD BY AUTOMATED COUNT: 12.5 % (ref 10–15)
GLUCOSE SERPL-MCNC: 99 MG/DL (ref 70–99)
HCO3 SERPL-SCNC: 29 MMOL/L (ref 22–29)
HCT VFR BLD AUTO: 39.6 % (ref 35–47)
HGB BLD-MCNC: 13.2 G/DL (ref 11.7–15.7)
IMM GRANULOCYTES # BLD: 0 10E3/UL
IMM GRANULOCYTES NFR BLD: 0 %
LYMPHOCYTES # BLD AUTO: 2 10E3/UL (ref 0.8–5.3)
LYMPHOCYTES NFR BLD AUTO: 34 %
MCH RBC QN AUTO: 31.6 PG (ref 26.5–33)
MCHC RBC AUTO-ENTMCNC: 33.3 G/DL (ref 31.5–36.5)
MCV RBC AUTO: 95 FL (ref 78–100)
MONOCYTES # BLD AUTO: 0.4 10E3/UL (ref 0–1.3)
MONOCYTES NFR BLD AUTO: 8 %
NEUTROPHILS # BLD AUTO: 3.3 10E3/UL (ref 1.6–8.3)
NEUTROPHILS NFR BLD AUTO: 56 %
NRBC # BLD AUTO: 0 10E3/UL
NRBC BLD AUTO-RTO: 0 /100
PLATELET # BLD AUTO: 287 10E3/UL (ref 150–450)
POTASSIUM SERPL-SCNC: 3.9 MMOL/L (ref 3.4–5.3)
RBC # BLD AUTO: 4.18 10E6/UL (ref 3.8–5.2)
SODIUM SERPL-SCNC: 141 MMOL/L (ref 135–145)
WBC # BLD AUTO: 5.8 10E3/UL (ref 4–11)

## 2024-09-05 PROCEDURE — 93010 ELECTROCARDIOGRAM REPORT: CPT | Performed by: INTERNAL MEDICINE

## 2024-09-05 PROCEDURE — G0463 HOSPITAL OUTPT CLINIC VISIT: HCPCS

## 2024-09-05 PROCEDURE — 80048 BASIC METABOLIC PNL TOTAL CA: CPT | Mod: ZL | Performed by: PHYSICIAN ASSISTANT

## 2024-09-05 PROCEDURE — 93005 ELECTROCARDIOGRAM TRACING: CPT | Performed by: PHYSICIAN ASSISTANT

## 2024-09-05 PROCEDURE — 99214 OFFICE O/P EST MOD 30 MIN: CPT | Performed by: PHYSICIAN ASSISTANT

## 2024-09-05 PROCEDURE — 36415 COLL VENOUS BLD VENIPUNCTURE: CPT | Mod: ZL | Performed by: PHYSICIAN ASSISTANT

## 2024-09-05 PROCEDURE — 85025 COMPLETE CBC W/AUTO DIFF WBC: CPT | Mod: ZL | Performed by: PHYSICIAN ASSISTANT

## 2024-09-05 RX ORDER — ESTRADIOL 0.1 MG/G
2 CREAM VAGINAL
Qty: 42.5 G | Refills: 1 | Status: SHIPPED | OUTPATIENT
Start: 2024-09-05

## 2024-09-05 RX ORDER — TRIAMCINOLONE ACETONIDE 1 MG/G
OINTMENT TOPICAL EVERY EVENING
Qty: 30 G | Refills: 0 | Status: SHIPPED | OUTPATIENT
Start: 2024-09-05

## 2024-09-05 RX ORDER — ALBUTEROL SULFATE 90 UG/1
AEROSOL, METERED RESPIRATORY (INHALATION)
Qty: 8.5 G | Refills: 0 | Status: SHIPPED | OUTPATIENT
Start: 2024-09-05

## 2024-09-05 RX ORDER — CLINDAMYCIN PHOSPHATE 20 MG/G
1 CREAM VAGINAL AT BEDTIME
Qty: 80 G | Refills: 0 | Status: SHIPPED | OUTPATIENT
Start: 2024-09-05

## 2024-09-05 RX ORDER — DEXTROAMPHETAMINE SACCHARATE, AMPHETAMINE ASPARTATE MONOHYDRATE, DEXTROAMPHETAMINE SULFATE AND AMPHETAMINE SULFATE 5; 5; 5; 5 MG/1; MG/1; MG/1; MG/1
20 CAPSULE, EXTENDED RELEASE ORAL DAILY
COMMUNITY
Start: 2024-08-26

## 2024-09-05 ASSESSMENT — PAIN SCALES - GENERAL: PAINLEVEL: NO PAIN (0)

## 2024-09-05 NOTE — PROGRESS NOTES
Preoperative Evaluation  [facility] [place]  [address] [place] [address]  Phone: [phone]  Primary Provider: RADHAMES Bowman  Pre-op Performing Provider: RADHAMES Bowman  Sep 5, 2024             9/4/2024   Surgical Information   What procedure is being done? Blepharoplasty   Facility or Hospital where procedure/surgery will be performed: Plastic surgery  [facility]   Who is doing the procedure / surgery? Dr. Clement Kramer   Date of surgery / procedure: October 24   Time of surgery / procedure: 8 am   Where do you plan to recover after surgery? at home with family        [facility] (Please obtain number)      Assessment & Plan     The proposed surgical procedure is considered LOW risk.    Preop general physical exam  She is optimized for surgery.  Unsure of the date of surgery.   - EKG 12-lead complete w/read - (Clinic Performed)  - CBC with platelets and differential; Future    Bronchospasm  Refill of medications. Doing quiet a bit of moving in her home packing things up and moving down to [place] fro winter. Then back in April.   - albuterol (PROAIR HFA/PROVENTIL HFA/VENTOLIN HFA) 108 (90 Base) MCG/ACT inhaler; INHALE 2 PUFFS INTO THE LUNGS EVERY 4 HOURS AS NEEDED FOR SHORTNESS OF BREATH/DYSPNEA.    Vulvar itching  Lichen treatment.   - triamcinolone (KENALOG) 0.1 % external ointment; Apply topically every evening. Do not use for more than 14 days. Apply to affected area in vulvar region.    Vaginitis and vulvovaginitis  Refill and getting her mammogram as well.   - estradiol (ESTRACE) 0.1 MG/GM vaginal cream; Place 2 g vaginally twice a week.  - clindamycin (CLEOCIN) 2 % vaginal cream; Place 1 applicator vaginally at bedtime.           Risks and Recommendations  The patient has the following additional risks and recommendations for perioperative complications:   - No identified additional risk factors other than previously  addressed         Recommendation  Approval given to proceed with proposed procedure, without further diagnostic evaluation.    Radha Rodgers is a 74 year old, presenting for the following:  Pre-Op Exam          9/5/2024     2:40 PM   Additional Questions   Roomed by Amanda Flowers   Accompanied by self         9/5/2024     2:40 PM   Patient Reported Additional Medications   Patient reports taking the following new medications none     HPI related to upcoming procedure: she will be having upper bleph.         9/4/2024   Pre-Op Questionnaire   Have you ever had a heart attack or stroke? No   Have you ever had surgery on your heart or blood vessels, such as a stent placement, a coronary artery bypass, or surgery on an artery in your head, neck, heart, or legs? No   Do you have chest pain with activity? No   Do you have a history of heart failure? No   Do you currently have a cold, bronchitis or symptoms of other infection? No   Do you have a cough, shortness of breath, or wheezing? No   Do you or anyone in your family have previous history of blood clots? No   Do you or does anyone in your family have a serious bleeding problem such as prolonged bleeding following surgeries or cuts? No   Have you ever had problems with anemia or been told to take iron pills? No   Have you had any abnormal blood loss such as black, tarry or bloody stools, or abnormal vaginal bleeding? No   Have you ever had a blood transfusion? No   Are you willing to have a blood transfusion if it is medically needed before, during, or after your surgery? Yes   Have you or any of your relatives ever had problems with anesthesia? No   Do you have sleep apnea, excessive snoring or daytime drowsiness? No   Do you have any artifical heart valves or other implanted medical devices like a pacemaker, defibrillator, or continuous glucose monitor? No   Do you have artificial joints? No   Are you allergic to latex? No        Health Care Directive  Patient has  a Health Care Directive on file      Preoperative Review of    reviewed - controlled substances reflected in medication list.      Status of Chronic Conditions:  See problem list for active medical problems.  Problems all longstanding and stable, except as noted/documented.  See ROS for pertinent symptoms related to these conditions.    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     Priority: Medium    Other secondary scoliosis, thoracolumbar region 06/24/2021     Priority: Medium    Mixed stress and urge urinary incontinence 12/26/2018     Priority: Medium    Leg cramps 12/26/2018     Priority: Medium    Vitamin D deficiency disease 12/26/2018     Priority: Medium    Major depressive disorder 01/16/2015     Priority: Medium    Insomnia 08/27/2014     Priority: Medium    Glucose intolerance (impaired glucose tolerance) 11/14/2013     Priority: Medium    Narcolepsy without 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) 07/31/2013     Priority: Medium    Complete rupture of rotator cuff 03/09/2009     Priority: Medium    Glucose intolerance 10/16/2008     Priority: Medium     Overview:   IMO Update 10/11      Esophageal reflux 03/26/2008     Priority: Medium    Pain in joint, ankle and foot 08/21/2007     Priority: Medium     Overview:   IMO Update 10/11      Open bimalleolar fracture 08/16/2007     Priority: Medium    Myalgia and myositis 05/01/2007     Priority: Medium     Overview:   IMO Update 10/11      Other malaise and fatigue 05/01/2007     Priority: Medium    Fibromyalgia 09/09/1984     Priority: Medium      Past Medical History:   Diagnosis Date    Arthritis     Attention deficit disorder of  childhood with hyper 02/22/2011    COPD (chronic obstructive pulmonary disease) (H)     Depression 01/01/2011    Esophageal reflux 02/22/2011    Fibromyalgia 03/18/2005    Insomnia 08/27/2014    Irritable bowel syndrome 02/22/2011    has mild prolapse last echo 9/06    Menopausal or female climacteric states 02/21/2007    Mitral valve disorders(424.0) 02/22/2011    has mild prolapse last echo 9/06    Myalgia and myositis, unspecified 02/25/2008    Narcolepsy     Need for prophylactic hormone replacement therapy (postmenopausal)     Other affections of shoulder region, not elsewhere 10/01/2008    Other follow-up examination(V67.59) 03/18/2005    Other screening mammogram 09/29/2006    Other specified pre-operative examination 05/04/2005    Posttraumatic stress disorder 02/22/2011    Restless legs syndrome (RLS) 02/22/2011    Sebaceous cyst 08/19/2005    Tourette's disorder 02/22/2011    Uncomplicated asthma     Ventral hernia 09/15/2011     Past Surgical History:   Procedure Laterality Date    BILATERAL CATARACT EXTRACTION  01/01/2006 01/01/2011    Bunion Surgery > RT  01/01/2010    COLONOSCOPY  12/23/2013    Procedure: COLONOSCOPY;  COLONOSCOPY;  Surgeon: Kasia Enrique DO;  Location: [facility] OR    D&C  01/01/1991 01/01/2011    deviated septum repair  01/01/2012    ECHOCARDIOGRAM  01/01/2006 01/01/2011    ENT SURGERY      deviated septum    EYE SURGERY      Cataracts    GYN SURGERY      HEMORRHOIDECTOMY  01/01/1979 01/01/2011    HYSTERECTOMY  01/01/1995 01/01/2011    LAPAROSCOPIC HERNIORRHAPHY VENTRAL  01/16/2014    Procedure: LAPAROSCOPIC HERNIORRHAPHY VENTRAL;  LAPAROSCOPIC VENTRAL HERNIA REPAIR W/ MESH;  Surgeon: Kasia Enrique DO;  Location: [facility] OR    leg repair after fx  >LT  01/01/2009    RELEASE CARPAL TUNNEL  01/01/2010    RT    TRIGGER  THUMB RELEASE  01/01/2011     Current Outpatient Medications   Medication Sig Dispense Refill    albuterol (PROAIR HFA/PROVENTIL HFA/VENTOLIN HFA) 108 (90  Base) MCG/ACT inhaler INHALE 2 PUFFS INTO THE LUNGS EVERY 4 HOURS AS NEEDED FOR SHORTNESS OF BREATH/DYSPNEA. 8.5 g 0    amphetamine-dextroamphetamine (ADDERALL XR) 20 MG 24 hr capsule Take 20 mg by mouth daily. Takes 2 tabs daily      cholecalciferol (VITAMIN D3) 1250 mcg (15049 units) capsule       citalopram (CELEXA) 20 MG tablet TAKE 1 TABLET BY MOUTH DAILY 30 tablet 0    clindamycin (CLEOCIN) 2 % vaginal cream Place 1 applicator vaginally at bedtime. 80 g 0    cyclobenzaprine (FLEXERIL) 5 MG tablet Take 1 tablet (5 mg) by mouth 3 times daily as needed for muscle spasms 90 tablet 3    diazepam (VALIUM) 2 MG tablet Take 2 mg by mouth every 12 hours as needed (vertigo)      estradiol (ESTRACE) 0.1 MG/GM vaginal cream Place 2 g vaginally twice a week. 42.5 g 1    fluticasone (FLONASE) 50 MCG/ACT nasal spray Spray 2 sprays into both nostrils daily 16 g 11    hydrochlorothiazide (HYDRODIURIL) 50 MG tablet TAKE 1 TABLET DAILY BY MOUTH AS NEEDED 90 tablet 2    ipratropium - albuterol 0.5 mg/2.5 mg/3 mL (DUONEB) 0.5-2.5 (3) MG/3ML neb solution NEBULIZE CONTENTS OF 1 VIALEVERY 6 HOURS AS NEEDED FORSHORTNESS OF BREATH OR WHEEZING 90 mL 3    modafinil (PROVIGIL) 200 MG tablet       Multiple Vitamins-Minerals (PRESERVISION AREDS 2) CAPS Take 1 capful by mouth daily      oxyBUTYnin ER (DITROPAN XL) 10 MG 24 hr tablet Take 1 tablet (10 mg) by mouth daily 30 tablet 1    polyethylene glycol (MIRALAX) 17 GM/Dose powder Take 17 g (1 capful) by mouth 2 times daily 850 g 3    pramipexole (MIRAPEX) 0.5 MG tablet TAKE 1 TABLET BY MOUTH EVERY EVENING 90 tablet 0    predniSONE (DELTASONE) 20 MG tablet TAKE 2 TABLETS BY MOUTH EVERY MORNING FOR 7 DAYS, 1 TABLET EVERY MORNING FOR 7 DAYS, THEN STOP 21 tablet 0    simvastatin (ZOCOR) 10 MG tablet TAKE 1 TABLET BY MOUTH DAILY AT BEDTIME 90 tablet 0    traMADol (ULTRAM) 50 MG tablet TAKE 1 TO 2 TABLETS BY MOUTH EVERY 6 HOURS AS NEEDED FOR MODERATE PAIN 60 tablet 0    triamcinolone (KENALOG)  "0.1 % external ointment Apply topically every evening. Do not use for more than 14 days. Apply to affected area in vulvar region. 30 g 0    zolpidem (AMBIEN) 5 MG tablet TAKE 1 TABLET (5 MG) BY MOUTH NIGHTLY AS NEEDED FOR SLEEP 30 tablet 0    amphetamine-dextroamphetamine (ADDERALL) 20 MG tablet TAKE 2 TABLETS BY MOUTH IN THE MORNING. TO AVOID INSOMNIA, LAST DAILY DOSE SHOULD BE TAKEN NO LESS THAN 6 HOURS BEFORE BED. (Patient not taking: Reported on 9/5/2024) 60 tablet 0       Allergies   Allergen Reactions    Acetaminophen      Darvocet-N 100      Aluminum     Clonidine      Dropped BP Drastically    Codamine Itching and Other (See Comments)     \"Buzzy\"    Codeine     Hydrocodone      \"Patient states can take liquid med but not pill\".    Meperidine     Oxycodone-Aspirin Itching, Nausea and Vomiting and Other (See Comments)     \"Buzzy\"    Pimozide Other (See Comments)     Drowsiness    Pregabalin Headache, Itching and Swelling    Propoxyphene Napsylate      Darvocet-N 100    Seasonal Allergies Other (See Comments)     Seasonal allergies.  Per 7/3/07, runny nose, stuffy, plugged ears.        Social History     Tobacco Use    Smoking status: Never    Smokeless tobacco: Never   Substance Use Topics    Alcohol use: Not Currently     Comment: will occassionally have glass of wine during Holidays     Family History   Problem Relation Age of Onset    Ovarian Cancer Mother     Depression Mother     Hypertension Mother     Cancer Mother         Cervical    Parkinsonism Mother     Anxiety Disorder Mother     Alcohol/Drug Father 42        Motor Vehicle Accident due to Alcoholism - Cause of Death    Other - See Comments Sister         ADD/ADHD    Diabetes Sister         older sibling    Other Cancer Sister         stage 4 ovarian    Depression Sister     Obesity Sister     Other - See Comments Sister         Fibromyalgia    Diabetes Sister     Diabetes Sister         younger  Sibling    Depression Sister     Anxiety Disorder " "Sister     Obesity Sister     Other - See Comments Sister         ADD/ADHD    Other - See Comments Son         ADD/ADHD    Other - See Comments Son         ADD/ADHD    Mental Illness Son         Bipolar,depressive with panic attacks    Diabetes Maternal Uncle     Parkinsonism Maternal Uncle     Obesity Brother      History   Drug Use Unknown             Review of Systems  Constitutional, HEENT, cardiovascular, pulmonary, gi and gu systems are negative, except as otherwise noted.    Objective    /62 (BP Location: Left arm, Patient Position: Sitting, Cuff Size: Adult Regular)   Pulse 78   Temp 97.8  F (36.6  C) (Tympanic)   Resp 18   Ht 1.613 m (5' 3.5\")   Wt 60.3 kg (133 lb)   SpO2 98%   BMI 23.19 kg/m     Estimated body mass index is 23.19 kg/m  as calculated from the following:    Height as of this encounter: 1.613 m (5' 3.5\").    Weight as of this encounter: 60.3 kg (133 lb).  Physical Exam  GENERAL: alert and no distress  EYES: Eyes grossly normal to inspection, PERRL and conjunctivae and sclerae normal  HENT: ear canals and TM's normal, nose and mouth without ulcers or lesions  NECK: no adenopathy, no asymmetry, masses, or scars  RESP: lungs clear to auscultation - no rales, rhonchi or wheezes  CV: regular rate and rhythm, normal S1 S2, no S3 or S4, no murmur, click or rub, no peripheral edema  ABDOMEN: soft, nontender, no hepatosplenomegaly, no masses and bowel sounds normal  MS: no gross musculoskeletal defects noted, no edema  SKIN: no suspicious lesions or rashes  NEURO: Normal strength and tone, mentation intact and speech normal  PSYCH: mentation appears normal, affect normal/bright  LYMPH: no cervical, supraclavicular, axillary, or inguinal adenopathy    Recent Labs   Lab Test 12/04/23  1445 09/22/23  1222   HGB 14.2  --      --      --    POTASSIUM 3.6  --    CR 0.80  --    A1C  --  5.9*        Diagnostics  Recent Results (from the past 24 hour(s))   EKG 12-lead complete w/read " - (Clinic Performed)    Collection Time: 09/05/24  3:12 PM   Result Value Ref Range    Systolic Blood Pressure  mmHg    Diastolic Blood Pressure  mmHg    Ventricular Rate 80 BPM    Atrial Rate 80 BPM    VT Interval 174 ms    QRS Duration 94 ms     ms    QTc 433 ms    P Axis 75 degrees    R AXIS 52 degrees    T Axis 63 degrees    Interpretation ECG       Sinus rhythm  Normal ECG  When compared with ECG of 04-Dec-2023 14:36,  No significant change was found        EKG: appears normal, NSR, normal axis, normal intervals, no acute ST/T changes c/w ischemia, no LVH by voltage criteria, unchanged from previous tracings    Revised Cardiac Risk Index (RCRI)  The patient has the following serious cardiovascular risks for perioperative complications:   - No serious cardiac risks = 0 points     RCRI Interpretation: 0 points: Class I (very low risk - 0.4% complication rate)         Signed Electronically by: RADHAMES Bowman  A copy of this evaluation report is provided to the requesting physician.         Answers submitted by the patient for this visit:  Patient Health Questionnaire (Submitted on 9/4/2024)  If you checked off any problems, how difficult have these problems made it for you to do your work, take care of things at home, or get along with other people?: Not difficult at all  PHQ9 TOTAL SCORE: 1

## 2024-09-06 LAB
ATRIAL RATE - MUSE: 80 BPM
DIASTOLIC BLOOD PRESSURE - MUSE: NORMAL MMHG
INTERPRETATION ECG - MUSE: NORMAL
P AXIS - MUSE: 75 DEGREES
PR INTERVAL - MUSE: 174 MS
QRS DURATION - MUSE: 94 MS
QT - MUSE: 376 MS
QTC - MUSE: 433 MS
R AXIS - MUSE: 52 DEGREES
SYSTOLIC BLOOD PRESSURE - MUSE: NORMAL MMHG
T AXIS - MUSE: 63 DEGREES
VENTRICULAR RATE- MUSE: 80 BPM

## 2024-09-14 ENCOUNTER — HEALTH MAINTENANCE LETTER (OUTPATIENT)
Age: 74
End: 2024-09-14

## 2024-09-23 DIAGNOSIS — G25.81 RESTLESS LEGS SYNDROME: ICD-10-CM

## 2024-09-23 DIAGNOSIS — F41.1 GAD (GENERALIZED ANXIETY DISORDER): ICD-10-CM

## 2024-09-23 DIAGNOSIS — F43.21 GRIEF: ICD-10-CM

## 2024-09-24 RX ORDER — CITALOPRAM HYDROBROMIDE 20 MG/1
20 TABLET ORAL DAILY
Qty: 30 TABLET | Refills: 11 | Status: SHIPPED | OUTPATIENT
Start: 2024-09-24

## 2024-09-24 RX ORDER — PRAMIPEXOLE DIHYDROCHLORIDE 0.5 MG/1
TABLET ORAL
Qty: 90 TABLET | Refills: 3 | Status: SHIPPED | OUTPATIENT
Start: 2024-09-24

## 2024-09-24 NOTE — TELEPHONE ENCOUNTER
[facility] Protocol [id number]/23/2024 05:23 PM   Protocol Details VALERIA-7 score of less than 5 in past 6 months.

## 2024-09-24 NOTE — TELEPHONE ENCOUNTER
Celexa      Last Written Prescription Date:  08/26/24  Last Fill Quantity: 30,   # refills: 0  Last Office Visit: 09/05/24  Future Office visit:         Mirapex      Last Written Prescription Date:  06/17/24  Last Fill Quantity: 90,   # refills: 0  Last Office Visit: 09/05/24  Future Office visit:

## 2024-10-14 DIAGNOSIS — G25.81 RESTLESS LEGS SYNDROME (RLS): ICD-10-CM

## 2024-10-14 RX ORDER — ZOLPIDEM TARTRATE 5 MG/1
TABLET ORAL
Qty: 30 TABLET | Refills: 3 | Status: SHIPPED | OUTPATIENT
Start: 2024-10-14

## 2024-10-14 NOTE — TELEPHONE ENCOUNTER
ZOLPIDEM 5mg  TABLET       Last Written Prescription Date:  08/11/2024  Last Fill Quantity: 30,   # refills: 0  Last Office Visit: 09/05/2024  Future Office visit:       Routing refill request to provider for review/approval because:    Kimberly Boecker, RN

## 2024-12-09 DIAGNOSIS — J45.901 MILD ASTHMA WITH ACUTE EXACERBATION, UNSPECIFIED WHETHER PERSISTENT: ICD-10-CM

## 2024-12-09 NOTE — TELEPHONE ENCOUNTER
Reason for call:  Medication      Have you contacted your pharmacy?  [facility] in [place]    If patient has contacted Pharmacy and it has been over 72hrs, continue to #2  Medication Prednisone  What Pharmacy do you use? [facility] in [place], [place] Fax #: [phone]      (Please note that the turn-around-time for prescriptions is 72 business hours; I am sending your request at this time. SEND TO appropriate Care Team Pool )

## 2024-12-09 NOTE — TELEPHONE ENCOUNTER
Prednisone       Last Written Prescription Date:  01/17/2022  Last Fill Quantity: 21,   # refills: 0  Last Office Visit: 09/12/2024  Future Office visit:       Routing refill request to provider for review/approval because:

## 2024-12-17 RX ORDER — PREDNISONE 20 MG/1
TABLET ORAL
Qty: 15 TABLET | Refills: 3 | Status: SHIPPED | OUTPATIENT
Start: 2024-12-17

## 2024-12-26 DIAGNOSIS — J98.01 BRONCHOSPASM: ICD-10-CM

## 2024-12-26 RX ORDER — ALBUTEROL SULFATE 90 UG/1
INHALANT RESPIRATORY (INHALATION)
Qty: 9 G | Refills: 3 | Status: SHIPPED | OUTPATIENT
Start: 2024-12-26

## 2025-03-31 DIAGNOSIS — N39.46 MIXED STRESS AND URGE URINARY INCONTINENCE: Chronic | ICD-10-CM

## 2025-03-31 NOTE — TELEPHONE ENCOUNTER
Reason for call:  Medication      Have you contacted your pharmacy? Yes   If patient has contacted Pharmacy and it has been over 72hrs, continue to #2  Medication oxyBUTYnin ER (DITROPAN XL) 10 MG 24 hr tablet   [facility]       (Please note that the turn-around-time for prescriptions is 72 business hours; I am sending your request at this time. SEND TO appropriate Care Team Pool )

## 2025-04-01 NOTE — TELEPHONE ENCOUNTER
Oxybutynin       Last Written Prescription Date:  10/26/23  Last Fill Quantity: 30,   # refills: 1  Last Office Visit: 9/5/24  Future Office visit:       Routing refill request to provider for review/approval because:

## 2025-04-02 RX ORDER — OXYBUTYNIN CHLORIDE 10 MG/1
10 TABLET, EXTENDED RELEASE ORAL DAILY
Qty: 30 TABLET | Refills: 1 | Status: SHIPPED | OUTPATIENT
Start: 2025-04-02

## 2025-04-21 ENCOUNTER — TELEPHONE (OUTPATIENT)
Dept: MAMMOGRAPHY | Facility: HOSPITAL | Age: 75
End: 2025-04-21

## 2025-04-21 ENCOUNTER — ANCILLARY PROCEDURE (OUTPATIENT)
Dept: MAMMOGRAPHY | Facility: OTHER | Age: 75
End: 2025-04-21
Attending: PHYSICIAN ASSISTANT
Payer: MEDICARE

## 2025-04-21 DIAGNOSIS — Z12.31 VISIT FOR SCREENING MAMMOGRAM: ICD-10-CM

## 2025-04-21 PROCEDURE — 77063 BREAST TOMOSYNTHESIS BI: CPT | Mod: TC

## 2025-04-21 PROCEDURE — 77067 SCR MAMMO BI INCL CAD: CPT | Mod: 26 | Performed by: RADIOLOGY

## 2025-04-21 PROCEDURE — 77063 BREAST TOMOSYNTHESIS BI: CPT | Mod: 26 | Performed by: RADIOLOGY

## 2025-04-24 NOTE — PROGRESS NOTES
{PROVIDER CHARTING PREFERENCE:[id number]}    Radha Rodgers is a 74 year old, presenting for the following health issues:  Establish Care        4/30/2025     2:48 PM   Additional Questions   Roomed by david li lpn   Accompanied by self         4/30/2025   Declines Weight   Did patient decline having their weight taken? Yes       Splitting time between here and [place].  Establishing with a Dr. Roque down there.    Ongoing right hip area pain since tripped on a cord  Worse with carrying/moving through out the day.    Needs refill of her nebulizer.  Has asthma.    Incontinence - worse - saw Urology - no idea plan as urologist left - planning  to see in [place]    RLS - on Pramipexole    Prednisone on hard for prn asthma flares    Hydrochlorothiazide  prs swelling - hasn't used in a long time - wants to takee off med list    Narcolepsy - sleep medicine        History of Present Illness       Mental Health Follow-up:  Patient presents to follow-up on Depression & Anxiety.Patient's depression since last visit has been:  Better  The patient is having other symptoms associated with depression.  Patient's anxiety since last visit has been:  Better  The patient is having other symptoms associated with anxiety.  Any significant life events: financial concerns, grief or loss and health concerns  Patient is feeling anxious or having panic attacks.  Patient has no concerns about alcohol or drug use.    Reason for visit:  Severe incontinence and uncontrolled cough    She eats 0-1 servings of fruits and vegetables daily.She consumes 1 sweetened beverage(s) daily.She exercises with enough effort to increase her heart rate 9 or less minutes per day.  She exercises with enough effort to increase her heart rate 3 or less days per week. She is missing 2 dose(s) of medications per week.  She is not taking prescribed medications regularly due to remembering to take.        Hyperlipidemia Follow-Up    Are you regularly  taking any medication or supplement to lower your cholesterol?   Yes- simvastatin  Are you having muscle aches or other side effects that you think could be caused by your cholesterol lowering medication?  Yes- cramping of muscles but take magnesium to help with this    Hypertension Follow-up    Do you check your blood pressure regularly outside of the clinic? No   Are you following a low salt diet? Yes  Are your blood pressures ever more than 140 on the top number (systolic) OR more   than 90 on the bottom number (diastolic), for example 140/90? No    BP Readings from Last 2 Encounters:   04/30/25 120/50   09/05/24 120/62     Depression   How are you doing with your depression since your last visit? No change  Are you having other symptoms that might be associated with depression? No  Have you had a significant life event?  OTHER: moving and new chapter in life    Are you feeling anxious or having panic attacks?   No  Do you have any concerns with your use of alcohol or other drugs? No    Social History     Tobacco Use    Smoking status: Never    Smokeless tobacco: Never   Vaping Use    Vaping status: Never Used   Substance Use Topics    Alcohol use: Not Currently     Comment: will occassionally have glass of wine during Holidays    Drug use: Never         4/1/2024    12:35 PM 6/25/2024     4:20 PM 9/4/2024     9:54 PM   PHQ   PHQ-9 Total Score 10 10 1   Q9: Thoughts of better off dead/self-harm past 2 weeks Not at all Not at all Not at all         4/1/2024    12:36 PM 6/25/2024     4:22 PM 4/25/2025     6:29 PM   VALERIA-7 SCORE   Total Score 7 (mild anxiety) 13 (moderate anxiety) 9 (mild anxiety)   Total Score 7 13 9        Patient-reported         9/4/2024     9:54 PM   Last PHQ-9   1.  Little interest or pleasure in doing things 0   2.  Feeling down, depressed, or hopeless 0   3.  Trouble falling or staying asleep, or sleeping too much 0   4.  Feeling tired or having little energy 0   5.  Poor appetite or  overeating 0   6.  Feeling bad about yourself 0   7.  Trouble concentrating 1   8.  Moving slowly or restless 0   Q9: Thoughts of better off dead/self-harm past 2 weeks 0   PHQ-9 Total Score 1         4/25/2025     6:29 PM   VALERIA-7    1. Feeling nervous, anxious, or on edge 1   2. Not being able to stop or control worrying 2   3. Worrying too much about different things 2   4. Trouble relaxing 2   5. Being so restless that it is hard to sit still 1   6. Becoming easily annoyed or irritable 1   7. Feeling afraid, as if something awful might happen 0   VALERIA-7 Total Score 9    If you checked any problems, how difficult have they made it for you to do your work, take care of things at home, or get along with other people? Somewhat difficult       Patient-reported       Suicide Assessment Five-step Evaluation and Treatment (SAFE-T)  {Provider  Link to Depression Care Package SmartSet :304969}    {additonal problems for provider to add (Optional):875921}    {ROS Picklists (Optional):831077}      Objective    /50 (BP Location: Left arm, Patient Position: Sitting, Cuff Size: Adult Regular)   Pulse 89   Temp 97.8  F (36.6  C) (Tympanic)   Resp 16   SpO2 96%   There is no height or weight on file to calculate BMI.  Physical Exam   {Exam List (Optional):165373}    {Diagnostic Test Results (Optional):266521}        Signed Electronically by: Robert Mccord DO  {Email feedback regarding this note to [email or web address]   :[id number]}     1. Feeling nervous, anxious, or on edge 1   2. Not being able to stop or control worrying 2   3. Worrying too much about different things 2   4. Trouble relaxing 2   5. Being so restless that it is hard to sit still 1   6. Becoming easily annoyed or irritable 1   7. Feeling afraid, as if something awful might happen 0   VALERIA-7 Total Score 9    If you checked any problems, how difficult have they made it for you to do your work, take care of things at home, or get along with other people? Somewhat difficult       Patient-reported       Suicide Assessment Five-step Evaluation and Treatment (SAFE-T)                Objective    /50 (BP Location: Left arm, Patient Position: Sitting, Cuff Size: Adult Regular)   Pulse 89   Temp 97.8  F (36.6  C) (Tympanic)   Resp 16   SpO2 96%   There is no height or weight on file to calculate BMI.  Physical Exam  Constitutional:       General: She is not in acute distress.     Appearance: Normal appearance.   Neck:      Vascular: No carotid bruit.   Cardiovascular:      Rate and Rhythm: Normal rate and regular rhythm.      Heart sounds: Normal heart sounds. No murmur heard.  Pulmonary:      Effort: Pulmonary effort is normal.      Breath sounds: Normal breath sounds.   Abdominal:      General: Bowel sounds are normal.      Palpations: Abdomen is soft.      Tenderness: There is no abdominal tenderness.   Musculoskeletal:      Comments: Hip unremarkable   Lymphadenopathy:      Cervical: No cervical adenopathy.   Neurological:      Mental Status: She is alert and oriented to person, place, and time.                    Signed Electronically by: Robert Mccord DO

## 2025-04-25 ASSESSMENT — ASTHMA QUESTIONNAIRES
QUESTION_5 LAST FOUR WEEKS HOW WOULD YOU RATE YOUR ASTHMA CONTROL: POORLY CONTROLLED
QUESTION_2 LAST FOUR WEEKS HOW OFTEN HAVE YOU HAD SHORTNESS OF BREATH: ONCE A DAY
QUESTION_4 LAST FOUR WEEKS HOW OFTEN HAVE YOU USED YOUR RESCUE INHALER OR NEBULIZER MEDICATION (SUCH AS ALBUTEROL): THREE OR MORE TIMES PER DAY
QUESTION_3 LAST FOUR WEEKS HOW OFTEN DID YOUR ASTHMA SYMPTOMS (WHEEZING, COUGHING, SHORTNESS OF BREATH, CHEST TIGHTNESS OR PAIN) WAKE YOU UP AT NIGHT OR EARLIER THAN USUAL IN THE MORNING: TWO OR THREE NIGHTS A WEEK
ACT_TOTALSCORE: 9
QUESTION_1 LAST FOUR WEEKS HOW MUCH OF THE TIME DID YOUR ASTHMA KEEP YOU FROM GETTING AS MUCH DONE AT WORK, SCHOOL OR AT HOME: MOST OF THE TIME

## 2025-04-25 ASSESSMENT — ANXIETY QUESTIONNAIRES
2. NOT BEING ABLE TO STOP OR CONTROL WORRYING: MORE THAN HALF THE DAYS
4. TROUBLE RELAXING: MORE THAN HALF THE DAYS
7. FEELING AFRAID AS IF SOMETHING AWFUL MIGHT HAPPEN: NOT AT ALL
8. IF YOU CHECKED OFF ANY PROBLEMS, HOW DIFFICULT HAVE THESE MADE IT FOR YOU TO DO YOUR WORK, TAKE CARE OF THINGS AT HOME, OR GET ALONG WITH OTHER PEOPLE?: SOMEWHAT DIFFICULT
3. WORRYING TOO MUCH ABOUT DIFFERENT THINGS: MORE THAN HALF THE DAYS
7. FEELING AFRAID AS IF SOMETHING AWFUL MIGHT HAPPEN: NOT AT ALL
5. BEING SO RESTLESS THAT IT IS HARD TO SIT STILL: SEVERAL DAYS
GAD7 TOTAL SCORE: 9
1. FEELING NERVOUS, ANXIOUS, OR ON EDGE: SEVERAL DAYS
6. BECOMING EASILY ANNOYED OR IRRITABLE: SEVERAL DAYS
IF YOU CHECKED OFF ANY PROBLEMS ON THIS QUESTIONNAIRE, HOW DIFFICULT HAVE THESE PROBLEMS MADE IT FOR YOU TO DO YOUR WORK, TAKE CARE OF THINGS AT HOME, OR GET ALONG WITH OTHER PEOPLE: SOMEWHAT DIFFICULT
GAD7 TOTAL SCORE: 9
GAD7 TOTAL SCORE: 9

## 2025-04-30 ENCOUNTER — OFFICE VISIT (OUTPATIENT)
Dept: FAMILY MEDICINE | Facility: OTHER | Age: 75
End: 2025-04-30
Attending: FAMILY MEDICINE
Payer: MEDICARE

## 2025-04-30 ENCOUNTER — ANCILLARY PROCEDURE (OUTPATIENT)
Dept: GENERAL RADIOLOGY | Facility: OTHER | Age: 75
End: 2025-04-30
Attending: FAMILY MEDICINE
Payer: MEDICARE

## 2025-04-30 VITALS
HEART RATE: 89 BPM | RESPIRATION RATE: 16 BRPM | TEMPERATURE: 97.8 F | SYSTOLIC BLOOD PRESSURE: 120 MMHG | OXYGEN SATURATION: 96 % | DIASTOLIC BLOOD PRESSURE: 50 MMHG

## 2025-04-30 DIAGNOSIS — I10 ESSENTIAL HYPERTENSION: ICD-10-CM

## 2025-04-30 DIAGNOSIS — J45.901 MILD ASTHMA WITH ACUTE EXACERBATION, UNSPECIFIED WHETHER PERSISTENT: ICD-10-CM

## 2025-04-30 DIAGNOSIS — F43.21 GRIEF: ICD-10-CM

## 2025-04-30 DIAGNOSIS — E55.9 VITAMIN D DEFICIENCY DISEASE: ICD-10-CM

## 2025-04-30 DIAGNOSIS — N39.46 MIXED STRESS AND URGE URINARY INCONTINENCE: Chronic | ICD-10-CM

## 2025-04-30 DIAGNOSIS — I63.81 CEREBROVASCULAR ACCIDENT (CVA) DUE TO OCCLUSION OF SMALL ARTERY (H): ICD-10-CM

## 2025-04-30 DIAGNOSIS — M25.551 HIP PAIN, RIGHT: ICD-10-CM

## 2025-04-30 DIAGNOSIS — R35.0 URINARY FREQUENCY: ICD-10-CM

## 2025-04-30 DIAGNOSIS — G25.81 RESTLESS LEGS SYNDROME: ICD-10-CM

## 2025-04-30 DIAGNOSIS — F41.1 GAD (GENERALIZED ANXIETY DISORDER): ICD-10-CM

## 2025-04-30 DIAGNOSIS — N76.0 VAGINITIS AND VULVOVAGINITIS: ICD-10-CM

## 2025-04-30 DIAGNOSIS — E04.1 THYROID NODULE: Primary | ICD-10-CM

## 2025-04-30 LAB
ALBUMIN SERPL BCG-MCNC: 3.6 G/DL (ref 3.5–5.2)
ALBUMIN UR-MCNC: NEGATIVE MG/DL
ALP SERPL-CCNC: 89 U/L (ref 40–150)
ALT SERPL W P-5'-P-CCNC: 16 U/L (ref 0–50)
ANION GAP SERPL CALCULATED.3IONS-SCNC: 17 MMOL/L (ref 7–15)
APPEARANCE UR: CLEAR
AST SERPL W P-5'-P-CCNC: 17 U/L (ref 0–45)
BASOPHILS # BLD AUTO: 0.1 10E3/UL (ref 0–0.2)
BASOPHILS NFR BLD AUTO: 1 %
BILIRUB SERPL-MCNC: 0.5 MG/DL
BILIRUB UR QL STRIP: NEGATIVE
BUN SERPL-MCNC: 21.9 MG/DL (ref 8–23)
CALCIUM SERPL-MCNC: 9.4 MG/DL (ref 8.8–10.4)
CHLORIDE SERPL-SCNC: 102 MMOL/L (ref 98–107)
COLOR UR AUTO: YELLOW
CREAT SERPL-MCNC: 0.81 MG/DL (ref 0.51–0.95)
EGFRCR SERPLBLD CKD-EPI 2021: 75 ML/MIN/1.73M2
EOSINOPHIL # BLD AUTO: 0.1 10E3/UL (ref 0–0.7)
EOSINOPHIL NFR BLD AUTO: 1 %
ERYTHROCYTE [DISTWIDTH] IN BLOOD BY AUTOMATED COUNT: 13.4 % (ref 10–15)
GLUCOSE SERPL-MCNC: 158 MG/DL (ref 70–99)
GLUCOSE UR STRIP-MCNC: NEGATIVE MG/DL
HCO3 SERPL-SCNC: 18 MMOL/L (ref 22–29)
HCT VFR BLD AUTO: 37.7 % (ref 35–47)
HGB BLD-MCNC: 12.8 G/DL (ref 11.7–15.7)
HGB UR QL STRIP: NEGATIVE
IMM GRANULOCYTES # BLD: 0 10E3/UL
IMM GRANULOCYTES NFR BLD: 0 %
KETONES UR STRIP-MCNC: ABNORMAL MG/DL
LEUKOCYTE ESTERASE UR QL STRIP: NEGATIVE
LYMPHOCYTES # BLD AUTO: 1.6 10E3/UL (ref 0.8–5.3)
LYMPHOCYTES NFR BLD AUTO: 19 %
MAGNESIUM SERPL-MCNC: 1.9 MG/DL (ref 1.7–2.3)
MCH RBC QN AUTO: 31.3 PG (ref 26.5–33)
MCHC RBC AUTO-ENTMCNC: 34 G/DL (ref 31.5–36.5)
MCV RBC AUTO: 92 FL (ref 78–100)
MONOCYTES # BLD AUTO: 0.6 10E3/UL (ref 0–1.3)
MONOCYTES NFR BLD AUTO: 7 %
MUCOUS THREADS #/AREA URNS LPF: PRESENT /LPF
NEUTROPHILS # BLD AUTO: 6.3 10E3/UL (ref 1.6–8.3)
NEUTROPHILS NFR BLD AUTO: 72 %
NITRATE UR QL: NEGATIVE
NRBC # BLD AUTO: 0 10E3/UL
NRBC BLD AUTO-RTO: 0 /100
PH UR STRIP: 5.5 [PH] (ref 4.7–8)
PLATELET # BLD AUTO: 374 10E3/UL (ref 150–450)
POTASSIUM SERPL-SCNC: 3.9 MMOL/L (ref 3.4–5.3)
PROT SERPL-MCNC: 7.2 G/DL (ref 6.4–8.3)
RBC # BLD AUTO: 4.09 10E6/UL (ref 3.8–5.2)
RBC URINE: 1 /HPF
SODIUM SERPL-SCNC: 137 MMOL/L (ref 135–145)
SP GR UR STRIP: 1.02 (ref 1–1.03)
SQUAMOUS EPITHELIAL: 11 /HPF
TSH SERPL DL<=0.005 MIU/L-ACNC: 1.15 UIU/ML (ref 0.3–4.2)
UROBILINOGEN UR STRIP-MCNC: NORMAL MG/DL
WBC # BLD AUTO: 8.8 10E3/UL (ref 4–11)
WBC URINE: 1 /HPF

## 2025-04-30 PROCEDURE — 82040 ASSAY OF SERUM ALBUMIN: CPT | Mod: ZL | Performed by: FAMILY MEDICINE

## 2025-04-30 PROCEDURE — 81001 URINALYSIS AUTO W/SCOPE: CPT | Mod: ZL | Performed by: FAMILY MEDICINE

## 2025-04-30 PROCEDURE — 73502 X-RAY EXAM HIP UNI 2-3 VIEWS: CPT | Mod: TC

## 2025-04-30 PROCEDURE — 82306 VITAMIN D 25 HYDROXY: CPT | Mod: ZL | Performed by: FAMILY MEDICINE

## 2025-04-30 PROCEDURE — 84443 ASSAY THYROID STIM HORMONE: CPT | Mod: ZL | Performed by: FAMILY MEDICINE

## 2025-04-30 PROCEDURE — 83735 ASSAY OF MAGNESIUM: CPT | Mod: ZL | Performed by: FAMILY MEDICINE

## 2025-04-30 PROCEDURE — 85004 AUTOMATED DIFF WBC COUNT: CPT | Mod: ZL | Performed by: FAMILY MEDICINE

## 2025-04-30 PROCEDURE — 73502 X-RAY EXAM HIP UNI 2-3 VIEWS: CPT | Mod: 26 | Performed by: STUDENT IN AN ORGANIZED HEALTH CARE EDUCATION/TRAINING PROGRAM

## 2025-04-30 PROCEDURE — 36415 COLL VENOUS BLD VENIPUNCTURE: CPT | Mod: ZL | Performed by: FAMILY MEDICINE

## 2025-04-30 PROCEDURE — G0463 HOSPITAL OUTPT CLINIC VISIT: HCPCS

## 2025-04-30 RX ORDER — CITALOPRAM HYDROBROMIDE 20 MG/1
20 TABLET ORAL DAILY
Qty: 30 TABLET | Refills: 11 | Status: SHIPPED | OUTPATIENT
Start: 2025-04-30

## 2025-04-30 RX ORDER — SIMVASTATIN 10 MG
10 TABLET ORAL AT BEDTIME
Qty: 90 TABLET | Refills: 1 | Status: SHIPPED | OUTPATIENT
Start: 2025-04-30

## 2025-04-30 RX ORDER — PRAMIPEXOLE DIHYDROCHLORIDE 0.5 MG/1
TABLET ORAL
Qty: 90 TABLET | Refills: 3 | Status: SHIPPED | OUTPATIENT
Start: 2025-04-30

## 2025-04-30 RX ORDER — ALBUTEROL SULFATE 90 UG/1
INHALANT RESPIRATORY (INHALATION)
Qty: 9 G | Refills: 3 | Status: SHIPPED | OUTPATIENT
Start: 2025-04-30

## 2025-04-30 RX ORDER — PREDNISONE 20 MG/1
TABLET ORAL
Qty: 15 TABLET | Refills: 3 | Status: SHIPPED | OUTPATIENT
Start: 2025-04-30

## 2025-04-30 RX ORDER — IPRATROPIUM BROMIDE AND ALBUTEROL SULFATE 2.5; .5 MG/3ML; MG/3ML
SOLUTION RESPIRATORY (INHALATION)
Qty: 90 ML | Refills: 3 | Status: SHIPPED | OUTPATIENT
Start: 2025-04-30

## 2025-04-30 RX ORDER — OXYBUTYNIN CHLORIDE 10 MG/1
10 TABLET, EXTENDED RELEASE ORAL DAILY
Qty: 90 TABLET | Refills: 1 | Status: SHIPPED | OUTPATIENT
Start: 2025-04-30

## 2025-04-30 RX ORDER — ESTRADIOL 0.1 MG/G
2 CREAM VAGINAL
Qty: 42.5 G | Refills: 1 | Status: SHIPPED | OUTPATIENT
Start: 2025-05-01

## 2025-04-30 ASSESSMENT — PAIN SCALES - GENERAL: PAINLEVEL_OUTOF10: MILD PAIN (3)

## 2025-05-01 ENCOUNTER — TELEPHONE (OUTPATIENT)
Dept: FAMILY MEDICINE | Facility: OTHER | Age: 75
End: 2025-05-01

## 2025-05-01 ENCOUNTER — HOSPITAL ENCOUNTER (OUTPATIENT)
Dept: ULTRASOUND IMAGING | Facility: HOSPITAL | Age: 75
Discharge: HOME | End: 2025-05-01
Attending: FAMILY MEDICINE
Payer: MEDICARE

## 2025-05-01 DIAGNOSIS — E04.1 THYROID NODULE: ICD-10-CM

## 2025-05-01 LAB — VIT D+METAB SERPL-MCNC: 68 NG/ML (ref 20–50)

## 2025-05-01 PROCEDURE — 76536 US EXAM OF HEAD AND NECK: CPT

## 2025-05-01 PROCEDURE — 76536 US EXAM OF HEAD AND NECK: CPT | Mod: 26 | Performed by: RADIOLOGY

## 2025-05-01 NOTE — TELEPHONE ENCOUNTER
[facility] ipratropium - albuterol 0.5 mg/2.5 mg/3 mL (DUONEB) 0.5-2.5 (3) MG/3ML neb solution. Submitted on CMM, waiting for response.

## 2025-05-05 NOTE — TELEPHONE ENCOUNTER
Received PA DENIAL from [employer] for ipratropium - albuterol 0.5 mg/2.5 mg/3 mL (DUONEB) 0.5-2.5 (3) MG/3ML neb solution. Scanned into [employer], routed to provider.    Reasoning: Medicare does not cover neb solutions - covered under PT B

## 2025-05-06 ENCOUNTER — APPOINTMENT (OUTPATIENT)
Dept: MRI IMAGING | Facility: CLINIC | Age: 75
End: 2025-05-06
Attending: EMERGENCY MEDICINE
Payer: MEDICARE

## 2025-05-06 ENCOUNTER — OFFICE VISIT (OUTPATIENT)
Dept: URGENT CARE | Facility: URGENT CARE | Age: 75
End: 2025-05-06
Payer: COMMERCIAL

## 2025-05-06 ENCOUNTER — HOSPITAL ENCOUNTER (EMERGENCY)
Facility: CLINIC | Age: 75
Discharge: HOME | End: 2025-05-06
Attending: EMERGENCY MEDICINE | Admitting: EMERGENCY MEDICINE
Payer: MEDICARE

## 2025-05-06 VITALS
TEMPERATURE: 98.4 F | DIASTOLIC BLOOD PRESSURE: 69 MMHG | RESPIRATION RATE: 18 BRPM | OXYGEN SATURATION: 100 % | HEART RATE: 79 BPM | SYSTOLIC BLOOD PRESSURE: 134 MMHG | HEIGHT: 63 IN | WEIGHT: 125 LBS | BODY MASS INDEX: 22.15 KG/M2

## 2025-05-06 VITALS
SYSTOLIC BLOOD PRESSURE: 128 MMHG | DIASTOLIC BLOOD PRESSURE: 62 MMHG | HEART RATE: 79 BPM | TEMPERATURE: 97.8 F | RESPIRATION RATE: 16 BRPM | WEIGHT: 125 LBS | BODY MASS INDEX: 21.8 KG/M2 | OXYGEN SATURATION: 98 %

## 2025-05-06 DIAGNOSIS — R55 SPELL OF LOSS OF CONSCIOUSNESS: ICD-10-CM

## 2025-05-06 DIAGNOSIS — J30.2 SEASONAL ALLERGIC RHINITIS, UNSPECIFIED TRIGGER: ICD-10-CM

## 2025-05-06 DIAGNOSIS — R55 SYNCOPE, UNSPECIFIED SYNCOPE TYPE: Primary | ICD-10-CM

## 2025-05-06 DIAGNOSIS — J45.21 MILD INTERMITTENT ASTHMA WITH ACUTE EXACERBATION: ICD-10-CM

## 2025-05-06 LAB
ANION GAP SERPL CALCULATED.3IONS-SCNC: 8 MMOL/L (ref 7–15)
ATRIAL RATE - MUSE: 69 BPM
BASOPHILS # BLD AUTO: 0.1 10E3/UL (ref 0–0.2)
BASOPHILS NFR BLD AUTO: 1 %
BUN SERPL-MCNC: 18.9 MG/DL (ref 8–23)
CALCIUM SERPL-MCNC: 9.3 MG/DL (ref 8.8–10.4)
CHLORIDE SERPL-SCNC: 104 MMOL/L (ref 98–107)
CREAT SERPL-MCNC: 0.76 MG/DL (ref 0.51–0.95)
DIASTOLIC BLOOD PRESSURE - MUSE: NORMAL MMHG
EGFRCR SERPLBLD CKD-EPI 2021: 81 ML/MIN/1.73M2
EOSINOPHIL # BLD AUTO: 0.2 10E3/UL (ref 0–0.7)
EOSINOPHIL NFR BLD AUTO: 4 %
ERYTHROCYTE [DISTWIDTH] IN BLOOD BY AUTOMATED COUNT: 13.5 % (ref 10–15)
GLUCOSE SERPL-MCNC: 97 MG/DL (ref 70–99)
HCO3 SERPL-SCNC: 25 MMOL/L (ref 22–29)
HCT VFR BLD AUTO: 40 % (ref 35–47)
HGB BLD-MCNC: 13.4 G/DL (ref 11.7–15.7)
HOLD SPECIMEN: NORMAL
HOLD SPECIMEN: NORMAL
IMM GRANULOCYTES # BLD: 0 10E3/UL
IMM GRANULOCYTES NFR BLD: 0 %
INTERPRETATION ECG - MUSE: NORMAL
LYMPHOCYTES # BLD AUTO: 2 10E3/UL (ref 0.8–5.3)
LYMPHOCYTES NFR BLD AUTO: 31 %
MCH RBC QN AUTO: 31.2 PG (ref 26.5–33)
MCHC RBC AUTO-ENTMCNC: 33.5 G/DL (ref 31.5–36.5)
MCV RBC AUTO: 93 FL (ref 78–100)
MONOCYTES # BLD AUTO: 0.5 10E3/UL (ref 0–1.3)
MONOCYTES NFR BLD AUTO: 8 %
NEUTROPHILS # BLD AUTO: 3.6 10E3/UL (ref 1.6–8.3)
NEUTROPHILS NFR BLD AUTO: 56 %
NRBC # BLD AUTO: 0 10E3/UL
NRBC BLD AUTO-RTO: 0 /100
P AXIS - MUSE: 64 DEGREES
PLATELET # BLD AUTO: 298 10E3/UL (ref 150–450)
POTASSIUM SERPL-SCNC: 3.8 MMOL/L (ref 3.4–5.3)
PR INTERVAL - MUSE: 174 MS
QRS DURATION - MUSE: 74 MS
QT - MUSE: 394 MS
QTC - MUSE: 422 MS
R AXIS - MUSE: 34 DEGREES
RBC # BLD AUTO: 4.3 10E6/UL (ref 3.8–5.2)
SODIUM SERPL-SCNC: 137 MMOL/L (ref 135–145)
SYSTOLIC BLOOD PRESSURE - MUSE: NORMAL MMHG
T AXIS - MUSE: 51 DEGREES
TROPONIN T SERPL HS-MCNC: <6 NG/L
VENTRICULAR RATE- MUSE: 69 BPM
WBC # BLD AUTO: 6.3 10E3/UL (ref 4–11)

## 2025-05-06 PROCEDURE — 80048 BASIC METABOLIC PNL TOTAL CA: CPT | Performed by: EMERGENCY MEDICINE

## 2025-05-06 PROCEDURE — 3074F SYST BP LT 130 MM HG: CPT | Performed by: PHYSICIAN ASSISTANT

## 2025-05-06 PROCEDURE — 255N000002 HC RX 255 OP 636: Performed by: EMERGENCY MEDICINE

## 2025-05-06 PROCEDURE — 85025 COMPLETE CBC W/AUTO DIFF WBC: CPT | Performed by: EMERGENCY MEDICINE

## 2025-05-06 PROCEDURE — A9585 GADOBUTROL INJECTION: HCPCS | Performed by: EMERGENCY MEDICINE

## 2025-05-06 PROCEDURE — 93005 ELECTROCARDIOGRAM TRACING: CPT | Performed by: EMERGENCY MEDICINE

## 2025-05-06 PROCEDURE — 99215 OFFICE O/P EST HI 40 MIN: CPT | Performed by: PHYSICIAN ASSISTANT

## 2025-05-06 PROCEDURE — 99417 PROLNG OP E/M EACH 15 MIN: CPT | Performed by: PHYSICIAN ASSISTANT

## 2025-05-06 PROCEDURE — 36415 COLL VENOUS BLD VENIPUNCTURE: CPT | Performed by: EMERGENCY MEDICINE

## 2025-05-06 PROCEDURE — 3078F DIAST BP <80 MM HG: CPT | Performed by: PHYSICIAN ASSISTANT

## 2025-05-06 PROCEDURE — 70553 MRI BRAIN STEM W/O & W/DYE: CPT

## 2025-05-06 PROCEDURE — 99285 EMERGENCY DEPT VISIT HI MDM: CPT | Mod: 25 | Performed by: EMERGENCY MEDICINE

## 2025-05-06 PROCEDURE — 84484 ASSAY OF TROPONIN QUANT: CPT | Performed by: EMERGENCY MEDICINE

## 2025-05-06 RX ORDER — BUDESONIDE AND FORMOTEROL FUMARATE DIHYDRATE 160; 4.5 UG/1; UG/1
AEROSOL RESPIRATORY (INHALATION)
Qty: 20.4 G | Refills: 6 | Status: SHIPPED | OUTPATIENT
Start: 2025-05-06

## 2025-05-06 RX ORDER — GADOBUTROL 604.72 MG/ML
5.5 INJECTION INTRAVENOUS ONCE
Status: COMPLETED | OUTPATIENT
Start: 2025-05-06 | End: 2025-05-06

## 2025-05-06 RX ORDER — FLUTICASONE PROPIONATE 50 MCG
1 SPRAY, SUSPENSION (ML) NASAL DAILY
Qty: 16 G | Refills: 3 | Status: SHIPPED | OUTPATIENT
Start: 2025-05-06

## 2025-05-06 RX ADMIN — GADOBUTROL 5.5 ML: 604.72 INJECTION INTRAVENOUS at 16:32

## 2025-05-06 ASSESSMENT — ENCOUNTER SYMPTOMS
RHINORRHEA: 1
WHEEZING: 1
FEVER: 0
SPEECH DIFFICULTY: 0
DIZZINESS: 1
ABDOMINAL PAIN: 0
LIGHT-HEADEDNESS: 1
COUGH: 1
WEAKNESS: 0

## 2025-05-06 ASSESSMENT — COLUMBIA-SUICIDE SEVERITY RATING SCALE - C-SSRS
2. HAVE YOU ACTUALLY HAD ANY THOUGHTS OF KILLING YOURSELF IN THE PAST MONTH?: NO
1. IN THE PAST MONTH, HAVE YOU WISHED YOU WERE DEAD OR WISHED YOU COULD GO TO SLEEP AND NOT WAKE UP?: NO
6. HAVE YOU EVER DONE ANYTHING, STARTED TO DO ANYTHING, OR PREPARED TO DO ANYTHING TO END YOUR LIFE?: NO

## 2025-05-06 ASSESSMENT — ACTIVITIES OF DAILY LIVING (ADL)
ADLS_ACUITY_SCORE: 41
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
## 2025-06-05 ENCOUNTER — TELEPHONE (OUTPATIENT)
Dept: FAMILY MEDICINE | Facility: OTHER | Age: 75
End: 2025-06-05

## 2025-06-05 NOTE — TELEPHONE ENCOUNTER
Reason for call:  Medication      Have you contacted your pharmacy? Yes, pharmacy has sent for request  If patient has contacted Pharmacy and it has been over 72hrs, continue to #2  Medication hydrochlorothiazide (water pill) 15 MG PRN  What Pharmacy do you use? [facility] [place] [facility]      (Please note that the turn-around-time for prescriptions is 72 business hours; I am sending your request at this time. SEND TO appropriate Care Team Pool )